# Patient Record
Sex: FEMALE | Race: WHITE | Employment: FULL TIME | URBAN - METROPOLITAN AREA
[De-identification: names, ages, dates, MRNs, and addresses within clinical notes are randomized per-mention and may not be internally consistent; named-entity substitution may affect disease eponyms.]

---

## 2017-01-06 ENCOUNTER — TELEPHONE (OUTPATIENT)
Dept: PULMONOLOGY | Age: 52
End: 2017-01-06

## 2017-01-06 NOTE — TELEPHONE ENCOUNTER
PT [id number]([phone]). PT HAVING CONGESTION, CHEST COLD & COUGH. SHE HAS TRIED OTC BUT NOTHING HAS HELPED. THINKS SHE MAY NEED AN ANTIBIOTIC. PLEASE CALL.

## 2017-01-10 ENCOUNTER — TELEPHONE (OUTPATIENT)
Dept: PULMONOLOGY | Age: 52
End: 2017-01-10

## 2017-01-10 RX ORDER — AMOXICILLIN AND CLAVULANATE POTASSIUM 875; 125 MG/1; MG/1
1 TABLET, FILM COATED ORAL 2 TIMES DAILY
Qty: 20 TAB | Refills: 0 | Status: SHIPPED | OUTPATIENT
Start: 2017-01-10 | End: 2017-01-20

## 2017-01-10 NOTE — TELEPHONE ENCOUNTER
The pt. states that her clear sputum of last week has turned yellow -green since the weekend. Chest congestion persists. She has dark tan nasal drainage with tiny spots of blood since last night. No elevated temp. .    Dr. Yon Robertson orders: Augmentin  The pt. Is informed of same.

## 2017-01-19 ENCOUNTER — HOSPITAL ENCOUNTER (OUTPATIENT)
Dept: LAB | Age: 52
Discharge: HOME | End: 2017-01-19

## 2017-01-19 DIAGNOSIS — J47.1 BRONCHIECTASIS WITH ACUTE EXACERBATION (HCC): ICD-10-CM

## 2017-01-19 PROCEDURE — 99001 SPECIMEN HANDLING PT-LAB: CPT | Performed by: INTERNAL MEDICINE

## 2017-02-20 ENCOUNTER — OFFICE VISIT (OUTPATIENT)
Dept: PULMONOLOGY | Age: 52
End: 2017-02-20

## 2017-02-20 VITALS
SYSTOLIC BLOOD PRESSURE: 90 MMHG | WEIGHT: 128 LBS | BODY MASS INDEX: 20.09 KG/M2 | DIASTOLIC BLOOD PRESSURE: 58 MMHG | HEIGHT: 67 IN | RESPIRATION RATE: 18 BRPM | OXYGEN SATURATION: 98 % | TEMPERATURE: 98.4 F | HEART RATE: 73 BPM

## 2017-02-20 DIAGNOSIS — J45.40 ALLERGIC ASTHMA, MODERATE PERSISTENT, UNCOMPLICATED: Primary | ICD-10-CM

## 2017-02-20 DIAGNOSIS — J47.9 BRONCHIECTASIS WITHOUT COMPLICATION (HCC): ICD-10-CM

## 2017-02-20 RX ORDER — FLUTICASONE FUROATE AND VILANTEROL 100; 25 UG/1; UG/1
1 POWDER RESPIRATORY (INHALATION) DAILY
Qty: 1 INHALER | Refills: 3 | Status: SHIPPED | OUTPATIENT
Start: 2017-02-20 | End: 2017-07-19 | Stop reason: SDUPTHER

## 2017-02-20 NOTE — PROGRESS NOTES
[facility]  Pulmonary, Critical Care, and Sleep Medicine      Pulmonary Office visit. Name: Katya Amor     : 1965     Date: 2017        Subjective:     Patient is a 45 y.o. female initially referred for Hemoptysis . 17   She feels much better with significant improvement in cough, wheezing with Breo ellipta. No further hemoptysis  Denies any new interval symptoms. Completed testing ordered and wishing to discuss results. HPI:  Patient reports being in good health and about 2 months back had an episode of spitting up blood X4 in an overnight period prompting visit to MD. She was prescribed antibiotics and improved. Further investigations with CXR and CT scan  Obtained with abnormalities on CT- Pulmonary consulted. She had since not had any further hemoptysis until 3 days back when she coughed up blood tinged mucus. She reports having a need to cough and clear her throat -usually in am. Denies any chronic cough. Denies any wheezing. Never diagnosed with asthma. She has seasonal increased nasal congestion, drainage  And postnasal drip- takes OTC Zrytec. Denies any fever, chills, weight loss. C/o chest pain- after eating certain foods. She has never smoked. No work in any industrial setting. She grew up in [address] [place] [address] and moved to [place] as an adult. No travel to [place] [address].  She has a dog at home. No h/o childhood respiratory illness- whooping cough. No known TB exposure. Allergies   Allergen Reactions    Percocet [Oxycodone-Acetaminophen] Nausea Only     Current Outpatient Prescriptions   Medication Sig Dispense Refill    fluticasone-vilanterol (BREO ELLIPTA) 100-25 mcg/dose inhaler Take 1 Puff by inhalation daily. 1 Inhaler 3    MULTIVITAMIN PO Take  by mouth.  predniSONE (DELTASONE) 5 mg tablet Take  by mouth. Tapering dose      cholecalciferol, vitamin d3, (VITAMIN D) 1,000 unit tablet Take  by mouth daily.        Review of Systems:  HEENT: No epistaxis, no nasal drainage, no difficulty in swallowing, no redness in eyes  Respiratory: as above  Cardiovascular: no chest pain, no palpitations, no chronic leg edema, no syncope  Gastrointestinal: no abd pain, no vomiting, no diarrhea, no bleeding symptoms  Genitourinary: No urinary symptoms or hematuria  Integument/breast: No ulcers or rashes  Musculoskeletal:Neg  Neurological: No focal weakness, no seizures, no headaches  Behvioral/Psych: No anxiety, no depression  Constitutional: No fever, no chills, no weight loss, no night sweats     Objective:     Visit Vitals    BP 90/58 (BP 1 Location: Left arm, BP Patient Position: Sitting)    Pulse 73    Temp 98.4 °F (36.9 °C)    Resp 18    Ht 5' 7\" (1.702 m)    Wt 58.1 kg (128 lb)    LMP 10/20/2011    SpO2 98%    BMI 20.05 kg/m2        Physical Exam:   General: comfortable, no acute distress  HEENT: pupils reactive, sclera anicteric, EOM intact  Neck: No adenopathy or thyroid swelling, no lymphadenopathy or JVD, supple  CVS: S1S2 no murmurs  RS: Mod AE bilaterally , Bilateral ronchi with expiratory wheezing,, no tactile fremitus or egophony, no accessory muscle use  Abd: soft, non tender, no hepatosplenomegaly  Neuro: non focal, awake, alert  Extrm: no leg edema, clubbing or cyanosis  Skin: no rash    Data review:    IgE- WNL  Allergy profile-    D. pteronyssinus <0.10  Class 0 kU/L Final     D. farinae Mite <0.10  Class 0 kU/L Final     Cat Hair/Dander <0.10  Class 0 kU/L Final     Dog Hair/Dander <0.10  Class 0 kU/L Final     B[place] Grass 4.18 (A) Class IV kU/L Final     Chap[place] grass 4.19 (A) Class IV kU/L Final     Umair Grass 2.79 (A) Class III kU/L Final     Bahia Grass 6.48 (A) Class IV kU/L Final     [id number] COCKROACH, AMERICAN <0.10  Class 0 kU/L Final     Penicillium notatum <0.10  Class 0 kU/L Final     Cladosporium herbarum <0.10  Class 0 kU/L Final     Aspergillus fumigatus <0.10  Class 0 kU/L Final     Mucor racemosus <0.10  Class 0 kU/L Final     Alternaria tenuis <0.10  Class 0 kU/L Final     Stemphylium botryosum <0.10  Class 0 kU/L Final     [id number] COMMON SILVER BIRCH 0.37 (A) Class I kU/L Final     [place] 1.67 (A) Class III kU/L Final     American White Elm 0.30 (A) Class 0/I kU/L Final     Maple/Box Elder 0.38 (A) Class I kU/L Final     White Hickory 0.70 (A) Class II kU/L Final     [id number] MAPLE LEAF [place] 0.20 (A) Class 0/I kU/L Final     White [place] <0.10  Class 0 kU/L Final     Sweet Gum 0.25 (A) Class 0/I kU/L Final     [place] 0.23 (A) Class 0/I kU/L Final     Ragweed, Short/Common 0.34 (A) Class I kU/L Final     Mugwort 0.19 (A) Class 0/I kU/L Final     Plantain, English 0.23 (A) Class 0/I kU/L Final     Pigweed, Rough 0.26 (A) Class 0/I kU/L Final     Sheep Sorrel (Dock) 0.31 (A) Class 0/I kU/L Final     Nettle 0.14 (A) Class 0/I kU/L Final       Narrative      Imaging:  I have personally reviewed the patients radiographs and have reviewed the reports and images:  CT scan of chest ( [facility]) reviewed images- biapical pleural thickening, apical parenchymal scarring and RML bronchiectatic changes with scattered infiltrates, RUL bronchiectatic changes     IMPRESSION:   · Bronchiectasis- predominant location in RML with some RUL distribution and biapical pleuroparenchymal infiltrates/fibrosis suggests a sequelae of remote inflammatory/granulomatous process. Currently exacerbated symptoms- bronchitis and hemoptysis with some scattered areas of infiltrates ? MAC. ? If she has sinopulmonary syndrome, less likely granulomatous vasculitis  · Hemoptysis secondary to above  · Reactive airways disease- allergic vs secondary to above.  Spirometry with partially reversible obstruction - FEV1 and FEF 25-75  · Allergic rhinitis and allergic asthma with positive allergy tests for grasses/trees,ragweed  · GERD- c/o \" gastritis and chest pain after eating spicy foods\"      RECOMMENDATIONS:     · Continue Breo-ellipta , prn albuterol  · Discussed strict allergen avoidance- grasses, trees. · Encouraged use of mask if outdoors in spring/fall  · Strict GERD precautions and add PPI  · Discussed at length diagnosis and treatment plan- written instructions given. · Will consider allergen therapy if unable to control with current  interventions  · Discussed allergy testing results and borderline low IgG result  · Will follow up in 3 months to monitor progress       Health maintenance screens deferred to Primary care provider.      Patel Richardson MD

## 2017-02-20 NOTE — COMMUNICATION BODY
[facility]  Pulmonary, Critical Care, and Sleep Medicine      Pulmonary Office visit. Name: Sandra Whaley     : 1965     Date: 2017        Subjective:     Patient is a 45 y.o. female initially referred for Hemoptysis . 17   She feels much better with significant improvement in cough, wheezing with Breo ellipta. No further hemoptysis  Denies any new interval symptoms. Completed testing ordered and wishing to discuss results. HPI:  Patient reports being in good health and about 2 months back had an episode of spitting up blood X4 in an overnight period prompting visit to MD. She was prescribed antibiotics and improved. Further investigations with CXR and CT scan  Obtained with abnormalities on CT- Pulmonary consulted. She had since not had any further hemoptysis until 3 days back when she coughed up blood tinged mucus. She reports having a need to cough and clear her throat -usually in am. Denies any chronic cough. Denies any wheezing. Never diagnosed with asthma. She has seasonal increased nasal congestion, drainage  And postnasal drip- takes OTC Zrytec. Denies any fever, chills, weight loss. C/o chest pain- after eating certain foods. She has never smoked. No work in any industrial setting. She grew up in [address] [place] [address] and moved to [place] as an adult. No travel to [place] [address].  She has a dog at home. No h/o childhood respiratory illness- whooping cough. No known TB exposure. Allergies   Allergen Reactions    Percocet [Oxycodone-Acetaminophen] Nausea Only     Current Outpatient Prescriptions   Medication Sig Dispense Refill    fluticasone-vilanterol (BREO ELLIPTA) 100-25 mcg/dose inhaler Take 1 Puff by inhalation daily. 1 Inhaler 3    MULTIVITAMIN PO Take  by mouth.  predniSONE (DELTASONE) 5 mg tablet Take  by mouth. Tapering dose      cholecalciferol, vitamin d3, (VITAMIN D) 1,000 unit tablet Take  by mouth daily.        Review of Systems:  HEENT: No epistaxis, no nasal drainage, no difficulty in swallowing, no redness in eyes  Respiratory: as above  Cardiovascular: no chest pain, no palpitations, no chronic leg edema, no syncope  Gastrointestinal: no abd pain, no vomiting, no diarrhea, no bleeding symptoms  Genitourinary: No urinary symptoms or hematuria  Integument/breast: No ulcers or rashes  Musculoskeletal:Neg  Neurological: No focal weakness, no seizures, no headaches  Behvioral/Psych: No anxiety, no depression  Constitutional: No fever, no chills, no weight loss, no night sweats     Objective:     Visit Vitals    BP 90/58 (BP 1 Location: Left arm, BP Patient Position: Sitting)    Pulse 73    Temp 98.4 °F (36.9 °C)    Resp 18    Ht 5' 7\" (1.702 m)    Wt 58.1 kg (128 lb)    LMP 10/20/2011    SpO2 98%    BMI 20.05 kg/m2        Physical Exam:   General: comfortable, no acute distress  HEENT: pupils reactive, sclera anicteric, EOM intact  Neck: No adenopathy or thyroid swelling, no lymphadenopathy or JVD, supple  CVS: S1S2 no murmurs  RS: Mod AE bilaterally , Bilateral ronchi with expiratory wheezing,, no tactile fremitus or egophony, no accessory muscle use  Abd: soft, non tender, no hepatosplenomegaly  Neuro: non focal, awake, alert  Extrm: no leg edema, clubbing or cyanosis  Skin: no rash    Data review:    IgE- WNL  Allergy profile-    D. pteronyssinus <0.10  Class 0 kU/L Final     D. farinae Mite <0.10  Class 0 kU/L Final     Cat Hair/Dander <0.10  Class 0 kU/L Final     Dog Hair/Dander <0.10  Class 0 kU/L Final     Berm[place] Grass 4.18 (A) Class IV kU/L Final     Chapin grass 4.19 (A) Class IV kU/L Final     Umair Grass 2.79 (A) Class III kU/L Final     Bahia Grass 6.48 (A) Class IV kU/L Final     [id number] COCKROACH, AMERICAN <0.10  Class 0 kU/L Final     Penicillium notatum <0.10  Class 0 kU/L Final     Cladosporium herbarum <0.10  Class 0 kU/L Final     Aspergillus fumigatus <0.10  Class 0 kU/L Final     Mucor racemosus <0.10  Class 0 kU/L Final     Alternaria tenuis <0.10  Class 0 kU/L Final     Stemphylium botryosum <0.10  Class 0 kU/L Final     [id number] COMMON SILVER BIRCH 0.37 (A) Class I kU/L Final     [place] 1.67 (A) Class III kU/L Final     American White Elm 0.30 (A) Class 0/I kU/L Final     Maple/Box Elder 0.38 (A) Class I kU/L Final     White Hickory 0.70 (A) Class II kU/L Final     [id number]-LOO MAPLE LEAF [place] 0.20 (A) Class 0/I kU/L Final     White [place] <0.10  Class 0 kU/L Final     Sweet Gum 0.25 (A) Class 0/I kU/L Final     Mountain [place] 0.23 (A) Class 0/I kU/L Final     Ragweed, Short/Common 0.34 (A) Class I kU/L Final     Mugwort 0.19 (A) Class 0/I kU/L Final     Plantain, English 0.23 (A) Class 0/I kU/L Final     Pigweed, Rough 0.26 (A) Class 0/I kU/L Final     Sheep Sorrel (Dock) 0.31 (A) Class 0/I kU/L Final     Nettle 0.14 (A) Class 0/I kU/L Final       Narrative      Imaging:  I have personally reviewed the patients radiographs and have reviewed the reports and images:  CT scan of chest ( [facility]) reviewed images- biapical pleural thickening, apical parenchymal scarring and RML bronchiectatic changes with scattered infiltrates, RUL bronchiectatic changes     IMPRESSION:   · Bronchiectasis- predominant location in RML with some RUL distribution and biapical pleuroparenchymal infiltrates/fibrosis suggests a sequelae of remote inflammatory/granulomatous process. Currently exacerbated symptoms- bronchitis and hemoptysis with some scattered areas of infiltrates ? MAC. ? If she has sinopulmonary syndrome, less likely granulomatous vasculitis  · Hemoptysis secondary to above  · Reactive airways disease- allergic vs secondary to above.  Spirometry with partially reversible obstruction - FEV1 and FEF 25-75  · Allergic rhinitis and allergic asthma with positive allergy tests for grasses/trees,ragweed  · GERD- c/o \" gastritis and chest pain after eating spicy foods\"      RECOMMENDATIONS:     · Continue Breo-ellipta , prn albuterol  · Discussed strict allergen avoidance- grasses, trees. · Encouraged use of mask if outdoors in spring/fall  · Strict GERD precautions and add PPI  · Discussed at length diagnosis and treatment plan- written instructions given. · Will consider allergen therapy if unable to control with current  interventions  · Discussed allergy testing results and borderline low IgG result  · Will follow up in 3 months to monitor progress       Health maintenance screens deferred to Primary care provider.      Lavon Mills MD

## 2017-02-20 NOTE — MR AVS SNAPSHOT
Visit Information Date & Time Provider Department Dept. Phone Encounter #  
 2/20/2017  2:45 PM Fredy Sloan MD [facility] [id number] Follow-up Instructions Return in about 3 months (around 5/20/2017). Upcoming Health Maintenance Date Due Hepatitis C Screening 1965 Pneumococcal 19-64 Medium Risk (1 of 1 - PPSV23) 8/26/1983 DTaP/Tdap/Td series (1 - Tdap) 8/26/1985 PAP AKA CERVICAL CYTOLOGY 7/19/2014 FOBT Q 1 YEAR AGE 50-75 8/26/2014 INFLUENZA AGE 9 TO ADULT 8/1/2016 BREAST CANCER SCRN MAMMOGRAM 11/14/2017 Allergies as of 2/20/2017  Review Complete On: 2/20/2017 By: Fredy Sloan MD  
  
 Severity Noted Reaction Type Reactions Percocet [Oxycodone-acetaminophen] Low 02/20/2017   Systemic Nausea Only Current Immunizations  Never Reviewed No immunizations on file. Not reviewed this visit Vitals BP Pulse Temp Resp Height(growth percentile) Weight(growth percentile) 90/58 (BP 1 Location: Left arm, BP Patient Position: Sitting) 73 98.4 °F (36.9 °C) 18 5' 7\" (1.702 m) 128 lb (58.1 kg) LMP SpO2 BMI OB Status 10/20/2011 98% 20.05 kg/m2 Hysterectomy BMI and BSA Data Body Mass Index Body Surface Area 20.05 kg/m 2 1.66 m 2 Preferred Pharmacy Pharmacy Name Phone [facility] [address] [facility] Your Updated Medication List  
  
   
This list is accurate as of: 2/20/17  3:08 PM.  Always use your most recent med list.  
  
  
  
  
 fluticasone-vilanterol 100-25 mcg/dose inhaler Commonly known as:  BREO ELLIPTA Take 1 Puff by inhalation daily. MULTIVITAMIN PO Take  by mouth.  
  
 predniSONE 5 mg tablet Commonly known as:  [employer] Take  by mouth. Tapering dose VITAMIN D3 1,000 unit tablet Generic drug:  cholecalciferol Take  by mouth daily. Prescriptions Sent to Pharmacy Refills  
 fluticasone-vilanterol (BREO ELLIPTA) 100-25 mcg/dose inhaler 3 Sig: Take 1 Puff by inhalation daily. Class: Normal  
 Pharmacy: [address] [facility] #: [phone] Route: Inhalation Follow-up Instructions Return in about 3 months (around 5/20/2017). Introducing [facility]! Dear Kev Whipple: Thank you for requesting a [employer] account. Our records indicate that you already have an active [employer] account. You can access your account anytime at [email or web address] Did you know that you can access your hospital and ER discharge instructions at any time in [employer]? You can also review all of your test results from your hospital stay or ER visit. Additional Information If you have questions, please visit the Frequently Asked Questions section of the [employer] website at [email or web address] Remember, [employer] is NOT to be used for urgent needs. For medical emergencies, dial 911. Now available from your iPhone and Android! Please provide this summary of care documentation to your next provider. Your primary care clinician is listed as Janice Yousif. If you have any questions after today's visit, please call [phone].

## 2017-02-20 NOTE — LETTER
2017 4:00 PM 
 
Patient:  Guido Ji YOB: 1965 Date of Visit: 2017 Dear Mera Lujan MD 
[address] [place] [address] VIA Facsimile: [phone] 
 : Thank you for referring Ms. Benedict Alpers to me for evaluation/treatment. Below are the relevant portions of my assessment and plan of care. [facility] Pulmonary, Critical Care, and Sleep Medicine Pulmonary Office visit. Name: Guido Ji : 1965 Date: 2017 Subjective:  
 
Patient is a 45 y.o. female initially referred for Hemoptysis . 17 She feels much better with significant improvement in cough, wheezing with Breo ellipta. No further hemoptysis Denies any new interval symptoms. Completed testing ordered and wishing to discuss results. HPI: 
Patient reports being in good health and about 2 months back had an episode of spitting up blood X4 in an overnight period prompting visit to MD. She was prescribed antibiotics and improved. Further investigations with CXR and CT scan  Obtained with abnormalities on CT- Pulmonary consulted. She had since not had any further hemoptysis until 3 days back when she coughed up blood tinged mucus. She reports having a need to cough and clear her throat -usually in am. Denies any chronic cough. Denies any wheezing. Never diagnosed with asthma. She has seasonal increased nasal congestion, drainage  And postnasal drip- takes OTC Zrytec. Denies any fever, chills, weight loss. C/o chest pain- after eating certain foods. She has never smoked. No work in any industrial setting. She grew up in [address] [place] [address] and moved to [place] as an adult. No travel to [place] [address]. 
She has a dog at home. No h/o childhood respiratory illness- whooping cough. No known TB exposure. Allergies Allergen Reactions  Percocet [Oxycodone-Acetaminophen] Nausea Only Current Outpatient Prescriptions Medication Sig Dispense Refill  fluticasone-vilanterol (BREO ELLIPTA) 100-25 mcg/dose inhaler Take 1 Puff by inhalation daily. 1 Inhaler 3  
 MULTIVITAMIN PO Take  by mouth.  predniSONE (DELTASONE) 5 mg tablet Take  by mouth. Tapering dose  cholecalciferol, vitamin d3, (VITAMIN D) 1,000 unit tablet Take  by mouth daily. Review of Systems: 
HEENT: No epistaxis, no nasal drainage, no difficulty in swallowing, no redness in eyes Respiratory: as above Cardiovascular: no chest pain, no palpitations, no chronic leg edema, no syncope Gastrointestinal: no abd pain, no vomiting, no diarrhea, no bleeding symptoms Genitourinary: No urinary symptoms or hematuria Integument/breast: No ulcers or rashes Musculoskeletal:Neg 
Neurological: No focal weakness, no seizures, no headaches Behvioral/Psych: No anxiety, no depression Constitutional: No fever, no chills, no weight loss, no night sweats Objective:  
 
Visit Vitals  BP 90/58 (BP 1 Location: Left arm, BP Patient Position: Sitting)  Pulse 73  Temp 98.4 °F (36.9 °C)  Resp 18  Ht 5' 7\" (1.702 m)  Wt 58.1 kg (128 lb)  LMP 10/20/2011  SpO2 98%  BMI 20.05 kg/m2 Physical Exam:  
General: comfortable, no acute distress HEENT: pupils reactive, sclera anicteric, EOM intact Neck: No adenopathy or thyroid swelling, no lymphadenopathy or JVD, supple CVS: S1S2 no murmurs RS: Mod AE bilaterally , Bilateral ronchi with expiratory wheezing,, no tactile fremitus or egophony, no accessory muscle use Abd: soft, non tender, no hepatosplenomegaly Neuro: non focal, awake, alert Extrm: no leg edema, clubbing or cyanosis Skin: no rash Data review: IgE- WNL Allergy profile- 
  D. pteronyssinus <0.10  Class 0 kU/L Final  
  D. farinae Mite <0.10  Class 0 kU/L Final  
  Cat Hair/Dander <0.10  Class 0 kU/L Final  
  Dog Hair/Dander <0.10  Class 0 kU/L Final  
   Bermuda Grass 4.18 (A) Class IV kU/L Final  
  Chapin grass 4.19 (A) Class IV kU/L Final  
  Umair Grass 2.79 (A) Class III kU/L Final  
  Bahia Grass 6.48 (A) Class IV kU/L Final  
  P045-DPS COCKROACH, AMERICAN <0.10  Class 0 kU/L Final  
  Penicillium notatum <0.10  Class 0 kU/L Final  
  Cladosporium herbarum <0.10  Class 0 kU/L Final  
  Aspergillus fumigatus <0.10  Class 0 kU/L Final  
  Mucor racemosus <0.10  Class 0 kU/L Final  
  Alternaria tenuis <0.10  Class 0 kU/L Final  
  Stemphylium botryosum <0.10  Class 0 kU/L Final  
  [id number] COMMON SILVER BIRCH 0.37 (A) Class I kU/L Final  
  [place] 1.67 (A) Class III kU/L Final  
  American White Elm 0.30 (A) Class 0/I kU/L Final  
  Maple/Box Elder 0.38 (A) Class I kU/L Final  
  White Hickory 0.70 (A) Class II kU/L Final  
  [id number] MAPLE LEAF [place] 0.20 (A) Class 0/I kU/L Final  
  White [place] <0.10  Class 0 kU/L Final  
  Sweet Gum 0.25 (A) Class 0/I kU/L Final  
  Mountain [place] 0.23 (A) Class 0/I kU/L Final  
  Ragweed, Short/Common 0.34 (A) Class I kU/L Final  
  Mugwort 0.19 (A) Class 0/I kU/L Final  
  Plantain, English 0.23 (A) Class 0/I kU/L Final  
  Pigweed, Rough 0.26 (A) Class 0/I kU/L Final  
  Sheep Sorrel (Dock) 0.31 (A) Class 0/I kU/L Final  
  Nettle 0.14 (A) Class 0/I kU/L Final  
   
Narrative Imaging: 
I have personally reviewed the patients radiographs and have reviewed the reports and images: CT scan of chest ( [facility]) reviewed images- biapical pleural thickening, apical parenchymal scarring and RML bronchiectatic changes with scattered infiltrates, RUL bronchiectatic changes IMPRESSION:  
· Bronchiectasis- predominant location in RML with some RUL distribution and biapical pleuroparenchymal infiltrates/fibrosis suggests a sequelae of remote inflammatory/granulomatous process. Currently exacerbated symptoms- bronchitis and hemoptysis with some scattered areas of infiltrates ?  MAC. ? If she has sinopulmonary syndrome, less likely granulomatous vasculitis · Hemoptysis secondary to above · Reactive airways disease- allergic vs secondary to above. Spirometry with partially reversible obstruction - FEV1 and FEF 25-75 · Allergic rhinitis and allergic asthma with positive allergy tests for grasses/trees,ragweed · GERD- c/o \" gastritis and chest pain after eating spicy foods\" RECOMMENDATIONS:  
 
· Continue  Breo-ellipta , prn albuterol · Discussed strict allergen avoidance- grasses, trees. · Encouraged use of mask if outdoors in spring/fall · Strict GERD precautions and add PPI · Discussed at length diagnosis and treatment plan- written instructions given. · Will consider allergen therapy if unable to control with current  interventions · Discussed allergy testing results and borderline low IgG result · Will follow up in 3 months to monitor progress Health maintenance screens deferred to Primary care provider. Andreas Grimes MD 
 
 
 
 
 
 
If you have questions, please do not hesitate to call me. I look forward to following Ms. Elliott along with you.  
 
 
 
Sincerely, 
 
 
Andreas Grimes MD

## 2017-07-19 ENCOUNTER — OFFICE VISIT (OUTPATIENT)
Dept: PULMONOLOGY | Age: 52
End: 2017-07-19

## 2017-07-19 VITALS
TEMPERATURE: 96.5 F | DIASTOLIC BLOOD PRESSURE: 50 MMHG | HEIGHT: 67 IN | SYSTOLIC BLOOD PRESSURE: 96 MMHG | WEIGHT: 126 LBS | RESPIRATION RATE: 12 BRPM | BODY MASS INDEX: 19.78 KG/M2 | OXYGEN SATURATION: 99 % | HEART RATE: 64 BPM

## 2017-07-19 DIAGNOSIS — J47.1 BRONCHIECTASIS WITH ACUTE EXACERBATION (HCC): ICD-10-CM

## 2017-07-19 DIAGNOSIS — J45.40 ALLERGIC ASTHMA, MODERATE PERSISTENT, UNCOMPLICATED: Primary | ICD-10-CM

## 2017-07-19 DIAGNOSIS — D80.1 HYPOGAMMAGLOBULINEMIA (HCC): ICD-10-CM

## 2017-07-19 RX ORDER — FLUTICASONE FUROATE AND VILANTEROL 100; 25 UG/1; UG/1
1 POWDER RESPIRATORY (INHALATION) DAILY
Qty: 1 INHALER | Refills: 3 | Status: SHIPPED | OUTPATIENT
Start: 2017-07-19 | End: 2017-11-02 | Stop reason: SDUPTHER

## 2017-07-19 RX ORDER — ALBUTEROL SULFATE 90 UG/1
2 AEROSOL, METERED RESPIRATORY (INHALATION)
COMMUNITY
Start: 2017-04-30 | End: 2017-11-02 | Stop reason: SDUPTHER

## 2017-07-19 RX ORDER — DOXYCYCLINE 100 MG/1
CAPSULE ORAL
COMMUNITY
Start: 2017-07-10 | End: 2017-11-02 | Stop reason: ALTCHOICE

## 2017-07-19 NOTE — COMMUNICATION BODY
[facility]  Pulmonary, Critical Care, and Sleep Medicine      Pulmonary Office visit. Name: Anusha Presley     : 1965     Date: 2017        Subjective:     Patient is a 45 y.o. female initially referred for Hemoptysis . Now followed for bronchiectasis. 17   Reports 2 episodes 2017 and  of fever, chills, throat discomfort and increased mucus- colored needing [facility] to [facility] and a course of antibiotics and Prednisone. Most recent treatment with Zithromax and now on doxycycline. She feels much better with significant improvement in cough, wheezing with Breo ellipta use and recent interventions  No further hemoptysis  Concerned about the 2 events and also coworkers being sick. Has been in same workspace for past 10 years but recent  Exacerbations . Denies any change in home environment. HPI:  Patient reports being in good health and about 2 months back had an episode of spitting up blood X4 in an overnight period prompting visit to MD. She was prescribed antibiotics and improved. Further investigations with CXR and CT scan  Obtained with abnormalities on CT- Pulmonary consulted. She had since not had any further hemoptysis until 3 days back when she coughed up blood tinged mucus. She reports having a need to cough and clear her throat -usually in am. Denies any chronic cough. Denies any wheezing. Never diagnosed with asthma. She has seasonal increased nasal congestion, drainage  And postnasal drip- takes OTC Zrytec. Denies any fever, chills, weight loss. C/o chest pain- after eating certain foods. She has never smoked. No work in any industrial setting. She grew up in [address] [place] [address] 1 and moved to [place] as an adult. No travel to [place] [address].  She has a dog at home. No h/o childhood respiratory illness- whooping cough. No known TB exposure.     Allergies   Allergen Reactions    Percocet [Oxycodone-Acetaminophen] Nausea Only Current Outpatient Prescriptions   Medication Sig Dispense Refill    doxycycline (MONODOX) 100 mg capsule       VENTOLIN HFA 90 mcg/actuation inhaler Take 2 Puffs by inhalation every four (4) hours as needed.  fluticasone-vilanterol (BREO ELLIPTA) 100-25 mcg/dose inhaler Take 1 Puff by inhalation daily. 1 Inhaler 3    predniSONE (DELTASONE) 5 mg tablet Take  by mouth. Tapering dose      MULTIVITAMIN PO Take  by mouth.  cholecalciferol, vitamin d3, (VITAMIN D) 1,000 unit tablet Take  by mouth daily. Review of Systems:  HEENT: No epistaxis, no nasal drainage, no difficulty in swallowing, no redness in eyes  Respiratory: as above  Cardiovascular: no chest pain, no palpitations, no chronic leg edema, no syncope  Gastrointestinal: no abd pain, no vomiting, no diarrhea, no bleeding symptoms  Genitourinary: No urinary symptoms or hematuria  Integument/breast: No ulcers or rashes  Musculoskeletal:Neg  Neurological: No focal weakness, no seizures, no headaches  Behvioral/Psych: No anxiety, no depression  Constitutional: No fever, no chills, no weight loss, no night sweats     Objective:     Visit Vitals    BP 96/50 (BP 1 Location: Left arm, BP Patient Position: Sitting)    Pulse 64    Temp 96.5 °F (35.8 °C) (Oral)    Resp 12    Ht 5' 7\" (1.702 m)    Wt 57.2 kg (126 lb)    LMP 10/20/2011    SpO2 99%    BMI 19.73 kg/m2        Physical Exam:   General: comfortable, no acute distress  HEENT: pupils reactive, sclera anicteric, EOM intact  Neck: No adenopathy or thyroid swelling, no lymphadenopathy or JVD, supple  CVS: S1S2 no murmurs  RS: Mod AE bilaterally , Bilateral ronchi with expiratory wheezing,, no tactile fremitus or egophony, no accessory muscle use  Abd: soft, non tender, no hepatosplenomegaly  Neuro: non focal, awake, alert  Extrm: no leg edema, clubbing or cyanosis  Skin: no rash    Data review:    IgE- WNL  Allergy profile-    D. pteronyssinus <0.10  Class 0 kU/L Final     D. farinae Mite <0.10  Class 0 kU/L Final     Cat Hair/Dander <0.10  Class 0 kU/L Final     Dog Hair/Dander <0.10  Class 0 kU/L Final     Bermuda Grass 4.18 (A) Class IV kU/L Final     Chapin grass 4.19 (A) Class IV kU/L Final     Umair Grass 2.79 (A) Class III kU/L Final     Bahia Grass 6.48 (A) Class IV kU/L Final     [id number] COCKROACH, AMERICAN <0.10  Class 0 kU/L Final     Penicillium notatum <0.10  Class 0 kU/L Final     Cladosporium herbarum <0.10  Class 0 kU/L Final     Aspergillus fumigatus <0.10  Class 0 kU/L Final     Mucor racemosus <0.10  Class 0 kU/L Final     Alternaria tenuis <0.10  Class 0 kU/L Final     Stemphylium botryosum <0.10  Class 0 kU/L Final     [id number] COMMON SILVER BIRCH 0.37 (A) Class I kU/L Final     [place]asper 1.67 (A) Class III kU/L Final     American White Elm 0.30 (A) Class 0/I kU/L Final     Maple/Box Elder 0.38 (A) Class I kU/L Final     White Hickory 0.70 (A) Class II kU/L Final     M470-[id number] MAPLE LEAF SYCAMORE 0.20 (A) Class 0/I kU/L Final     White [place] <0.10  Class 0 kU/L Final     Sweet Gum 0.25 (A) Class 0/I kU/L Final     Mountain [place] 0.23 (A) Class 0/I kU/L Final     Ragweed, Short/Common 0.34 (A) Class I kU/L Final     Mugwort 0.19 (A) Class 0/I kU/L Final     Plantain, English 0.23 (A) Class 0/I kU/L Final     Pigweed, Rough 0.26 (A) Class 0/I kU/L Final     Sheep Sorrel (Dock) 0.31 (A) Class 0/I kU/L Final     Nettle 0.14 (A) Class 0/I kU/L Final       Narrative      Imaging:  I have personally reviewed the patients radiographs and have reviewed the reports and images:  CT scan of chest ( [facility]) reviewed images- biapical pleural thickening, apical parenchymal scarring and RML bronchiectatic changes with scattered infiltrates, RUL bronchiectatic changes     IMPRESSION:   · Bronchiectasis- predominant location in RML with some RUL distribution and biapical pleuroparenchymal infiltrates/fibrosis suggests a sequelae of remote inflammatory/granulomatous process.  Currently exacerbated symptoms- bronchitis and hemoptysis with some scattered areas of infiltrates ? MAC. ? If she has sinopulmonary syndrome, less likely granulomatous vasculitis. ?? Role of low IgG subclass 3  · Hemoptysis secondary to above  · Reactive airways disease- allergic vs secondary to above. Spirometry with partially reversible obstruction - FEV1 and FEF 25-75  · Allergic rhinitis and allergic asthma with positive allergy tests for grasses/trees,ragweed  · GERD- c/o \" gastritis and chest pain after eating spicy foods\"      RECOMMENDATIONS:     · Continue  Breo-ellipta , prn albuterol  · Discussed strict allergen avoidance- grasses, trees. · Encouraged use of mask if outdoors in spring/fall  · Strict GERD precautions and add PPI  · Discussed at length diagnosis and treatment plan- written instructions given. · Will consider allergen therapy if unable to control with current  interventions  · Discussed allergy testing results and borderline low IgG result. Will repeat in view of frequent infections  · Discussed vigilance and checking out for infectious /allergic triggers in work space  · Will follow up in 3 months to monitor progress       Health maintenance screens deferred to Primary care provider.      Gamaliel Rothman MD

## 2017-07-19 NOTE — MR AVS SNAPSHOT
Visit Information Date & Time Provider Department Dept. Phone Encounter #  
 7/19/2017 11:30 AM Teddy Kearney MD [facility] [id number] Follow-up Instructions Return in about 3 months (around 10/19/2017). Upcoming Health Maintenance Date Due Hepatitis C Screening 1965 DTaP/Tdap/Td series (1 - Tdap) 8/26/1985 PAP AKA CERVICAL CYTOLOGY 7/19/2014 FOBT Q 1 YEAR AGE 50-75 8/26/2014 BREAST CANCER SCRN MAMMOGRAM 11/14/2017 Pneumococcal 19-64 Medium Risk (1 of 1 - PPSV23) 10/19/2017* INFLUENZA AGE 9 TO ADULT 8/1/2017 *Topic was postponed. The date shown is not the original due date. Allergies as of 7/19/2017  Review Complete On: 7/19/2017 By: Teddy Kearney MD  
  
 Severity Noted Reaction Type Reactions Percocet [Oxycodone-acetaminophen] Low 02/20/2017   Systemic Nausea Only Current Immunizations  Never Reviewed No immunizations on file. Not reviewed this visit You Were Diagnosed With   
  
 Codes Comments Hypogammaglobulinemia ([facility].)    -  Primary ICD-10-CM: D80.1 ICD-9-CM: 279.00 Vitals BP Pulse Temp Resp Height(growth percentile) Weight(growth percentile) 96/50 (BP 1 Location: Left arm, BP Patient Position: Sitting) 64 96.5 °F (35.8 °C) (Oral) 12 5' 7\" (1.702 m) 126 lb (57.2 kg) LMP SpO2 BMI OB Status 10/20/2011 99% 19.73 kg/m2 Hysterectomy Vitals History BMI and BSA Data Body Mass Index Body Surface Area  
 19.73 kg/m 2 1.64 m 2 Preferred Pharm[facility] [address] [facility] Your Updated Medication List  
  
   
This list is accurate as of: 7/19/17 12:04 PM.  Always use your most recent med list.  
  
  
  
  
 doxycycline 100 mg capsule Commonly known as:  MONODOX  
  
 fluticasone-vilanterol 100-25 mcg/dose inhaler Commonly known as:  BREO ELLIPTA Take 1 Puff by inhalation daily. MULTIVITAMIN PO Take  by mouth.  
  
 predniSONE 5 mg tablet Commonly known as:  Ancel Clock Take  by mouth. Tapering dose VENTOLIN HFA 90 mcg/actuation inhaler Generic drug:  albuterol Take 2 Puffs by inhalation every four (4) hours as needed. VITAMIN D3 1,000 unit tablet Generic drug:  cholecalciferol Take  by mouth daily. Prescriptions Sent to Pharmacy Refills  
 fluticasone-vilanterol (BREO ELLIPTA) 100-25 mcg/dose inhaler 3 Sig: Take 1 Puff by inhalation daily. Class: Normal  
 Pharmacy: [facility] [address] [place] [address] #: [phone] Route: Inhalation Follow-up Instructions Return in about 3 months (around 10/19/2017). To-Do List   
 09/19/2017 Lab:  IGG SUBCLASSES Introducing [facility]! Dear Imtiaz Cagle: Thank you for requesting a [employer] account. Our records indicate that you already have an active Thinkf[employer] account. You can access your account anytime at [email or web address] Did you know that you can access your hospital and ER discharge instructions at any time in Thinkfuse? You can also review all of your test results from your hospital stay or ER visit. Additional Information If you have questions, please visit the Frequently Asked Questions section of the Thinkfuse website at [email or web address] Remember, Thinkfuse is NOT to be used for urgent needs. For medical emergencies, dial 911. Now available from your iPhone and Android! Please provide this summary of care documentation to your next provider. Your primary care clinician is listed as Archer Sacks. If you have any questions after today's visit, please call [phone].

## 2017-07-19 NOTE — PROGRESS NOTES
[facility]  Pulmonary, Critical Care, and Sleep Medicine      Pulmonary Office visit. Name: Hi Haskins     : 1965     Date: 2017        Subjective:     Patient is a 45 y.o. female initially referred for Hemoptysis . Now followed for bronchiectasis. 17   Reports 2 episodes 2017 and  of fever, chills, throat discomfort and increased mucus- colored needing visit to [facility] and a course of antibiotics and Prednisone. Most recent treatment with Zithromax and now on doxycycline. She feels much better with significant improvement in cough, wheezing with Breo ellipta use and recent interventions  No further hemoptysis  Concerned about the 2 events and also coworkers being sick. Has been in same workspace for past 10 years but recent  Exacerbations . Denies any change in home environment. HPI:  Patient reports being in good health and about 2 months back had an episode of spitting up blood X4 in an overnight period prompting visit to MD. She was prescribed antibiotics and improved. Further investigations with CXR and CT scan  Obtained with abnormalities on CT- Pulmonary consulted. She had since not had any further hemoptysis until 3 days back when she coughed up blood tinged mucus. She reports having a need to cough and clear her throat -usually in am. Denies any chronic cough. Denies any wheezing. Never diagnosed with asthma. She has seasonal increased nasal congestion, drainage  And postnasal drip- takes OTC Zrytec. Denies any fever, chills, weight loss. C/o chest pain- after eating certain foods. She has never smoked. No work in any industrial setting. She grew up in [address] [place] [address] and moved to [place] as an adult. No travel to [place] [address].  She has a dog at home. No h/o childhood respiratory illness- whooping cough. No known TB exposure.     Allergies   Allergen Reactions    Percocet [Oxycodone-Acetaminophen] Nausea Only Current Outpatient Prescriptions   Medication Sig Dispense Refill    doxycycline (MONODOX) 100 mg capsule       VENTOLIN HFA 90 mcg/actuation inhaler Take 2 Puffs by inhalation every four (4) hours as needed.  fluticasone-vilanterol (BREO ELLIPTA) 100-25 mcg/dose inhaler Take 1 Puff by inhalation daily. 1 Inhaler 3    predniSONE (DELTASONE) 5 mg tablet Take  by mouth. Tapering dose      MULTIVITAMIN PO Take  by mouth.  cholecalciferol, vitamin d3, (VITAMIN D) 1,000 unit tablet Take  by mouth daily. Review of Systems:  HEENT: No epistaxis, no nasal drainage, no difficulty in swallowing, no redness in eyes  Respiratory: as above  Cardiovascular: no chest pain, no palpitations, no chronic leg edema, no syncope  Gastrointestinal: no abd pain, no vomiting, no diarrhea, no bleeding symptoms  Genitourinary: No urinary symptoms or hematuria  Integument/breast: No ulcers or rashes  Musculoskeletal:Neg  Neurological: No focal weakness, no seizures, no headaches  Behvioral/Psych: No anxiety, no depression  Constitutional: No fever, no chills, no weight loss, no night sweats     Objective:     Visit Vitals    BP 96/50 (BP 1 Location: Left arm, BP Patient Position: Sitting)    Pulse 64    Temp 96.5 °F (35.8 °C) (Oral)    Resp 12    Ht 5' 7\" (1.702 m)    Wt 57.2 kg (126 lb)    LMP 10/20/2011    SpO2 99%    BMI 19.73 kg/m2        Physical Exam:   General: comfortable, no acute distress  HEENT: pupils reactive, sclera anicteric, EOM intact  Neck: No adenopathy or thyroid swelling, no lymphadenopathy or JVD, supple  CVS: S1S2 no murmurs  RS: Mod AE bilaterally , Bilateral ronchi with expiratory wheezing,, no tactile fremitus or egophony, no accessory muscle use  Abd: soft, non tender, no hepatosplenomegaly  Neuro: non focal, awake, alert  Extrm: no leg edema, clubbing or cyanosis  Skin: no rash    Data review:    IgE- WNL  Allergy profile-    D. pteronyssinus <0.10  Class 0 kU/L Final     D. farinae Mite <0.10  Class 0 kU/L Final     Cat Hair/Dander <0.10  Class 0 kU/L Final     Dog Hair/Dander <0.10  Class 0 kU/L Final     Bermuda Grass 4.18 (A) Class IV kU/L Final     Chapin grass 4.19 (A) Class IV kU/L Final     Umair Grass 2.79 (A) Class III kU/L Final     Bahia Grass 6.48 (A) Class IV kU/L Final     [id number] COCKROACH, AMERICAN <0.10  Class 0 kU/L Final     Penicillium notatum <0.10  Class 0 kU/L Final     Cladosporium herbarum <0.10  Class 0 kU/L Final     Aspergillus fumigatus <0.10  Class 0 kU/L Final     Mucor racemosus <0.10  Class 0 kU/L Final     Alternaria tenuis <0.10  Class 0 kU/L Final     Stemphylium botryosum <0.10  Class 0 kU/L Final     [id number] COMMON SILVER BIRCH 0.37 (A) Class I kU/L Final     Hope 1.67 (A) Class III kU/L Final     American White Elm 0.30 (A) Class 0/I kU/L Final     Maple/Box Elder 0.38 (A) Class I kU/L Final     White Hickory 0.70 (A) Class II kU/L Final     [id number] MAPLE LEAF SYCAMORE 0.20 (A) Class 0/I kU/L Final     White [place] <0.10  Class 0 kU/L Final     Sweet Gum 0.25 (A) Class 0/I kU/L Final     Mountain [place] 0.23 (A) Class 0/I kU/L Final     Ragweed, Short/Common 0.34 (A) Class I kU/L Final     Mugwort 0.19 (A) Class 0/I kU/L Final     Plantain, English 0.23 (A) Class 0/I kU/L Final     Pigweed, Rough 0.26 (A) Class 0/I kU/L Final     Sheep Sorrel (Dock) 0.31 (A) Class 0/I kU/L Final     Nettle 0.14 (A) Class 0/I kU/L Final       Narrative      Imaging:  I have personally reviewed the patients radiographs and have reviewed the reports and images:  CT scan of chest ( [facility]) reviewed images- biapical pleural thickening, apical parenchymal scarring and RML bronchiectatic changes with scattered infiltrates, RUL bronchiectatic changes     IMPRESSION:   · Bronchiectasis- predominant location in RML with some RUL distribution and biapical pleuroparenchymal infiltrates/fibrosis suggests a sequelae of remote inflammatory/granulomatous process.  Currently exacerbated symptoms- bronchitis and hemoptysis with some scattered areas of infiltrates ? MAC. ? If she has sinopulmonary syndrome, less likely granulomatous vasculitis. ?? Role of low IgG subclass 3  · Hemoptysis secondary to above  · Reactive airways disease- allergic vs secondary to above. Spirometry with partially reversible obstruction - FEV1 and FEF 25-75  · Allergic rhinitis and allergic asthma with positive allergy tests for grasses/trees,ragweed  · GERD- c/o \" gastritis and chest pain after eating spicy foods\"      RECOMMENDATIONS:     · Continue  Breo-ellipta , prn albuterol  · Discussed strict allergen avoidance- grasses, trees. · Encouraged use of mask if outdoors in spring/fall  · Strict GERD precautions and add PPI  · Discussed at length diagnosis and treatment plan- written instructions given. · Will consider allergen therapy if unable to control with current  interventions  · Discussed allergy testing results and borderline low IgG result. Will repeat in view of frequent infections  · Discussed vigilance and checking out for infectious /allergic triggers in work space  · Will follow up in 3 months to monitor progress       Health maintenance screens deferred to Primary care provider.      Caroline Stewart MD

## 2017-07-19 NOTE — LETTER
2017 3:04 PM 
 
Patient:  Preeti Madden YOB: 1965 Date of Visit: 2017 Dear Tod Fishman MD 
[place] [address] [address] [place] [address] VIA Facsimile: [phone] 
 : Thank you for referring Ms. Justen Harvey to me for evaluation/treatment. Below are the relevant portions of my assessment and plan of care. [facility] Pulmonary, Critical Care, and Sleep Medicine Pulmonary Office visit. Name: Preeti Madden : 1965 Date: 2017 Subjective:  
 
Patient is a 45 y.o. female initially referred for Hemoptysis . Now followed for bronchiectasis. 17 Reports 2 episodes 2017 and  of fever, chills, throat discomfort and increased mucus- colored needing visit to Patient [facility] and a course of antibiotics and Prednisone. Most recent treatment with Zithromax and now on doxycycline. She feels much better with significant improvement in cough, wheezing with Breo ellipta use and recent interventions No further hemoptysis Concerned about the 2 events and also coworkers being sick. Has been in same workspace for past 10 years but recent  Exacerbations . Denies any change in home environment. HPI: 
Patient reports being in good health and about 2 months back had an episode of spitting up blood X4 in an overnight period prompting visit to MD. She was prescribed antibiotics and improved. Further investigations with CXR and CT scan  Obtained with abnormalities on CT- Pulmonary consulted. She had since not had any further hemoptysis until 3 days back when she coughed up blood tinged mucus. She reports having a need to cough and clear her throat -usually in am. Denies any chronic cough. Denies any wheezing. Never diagnosed with asthma. She has seasonal increased nasal congestion, drainage  And postnasal drip- takes OTC Zrytec. Denies any fever, chills, weight loss. C/o chest pain- after eating certain foods. She has never smoked. No work in any industrial setting. She grew up in [address] [place] [address] and moved to [place] as an adult. No travel to [place] [address]. 
She has a dog at home. No h/o childhood respiratory illness- whooping cough. No known TB exposure. Allergies Allergen Reactions  Percocet [Oxycodone-Acetaminophen] Nausea Only Current Outpatient Prescriptions Medication Sig Dispense Refill  doxycycline (MONODOX) 100 mg capsule  VENTOLIN HFA 90 mcg/actuation inhaler Take 2 Puffs by inhalation every four (4) hours as needed.  fluticasone-vilanterol (BREO ELLIPTA) 100-25 mcg/dose inhaler Take 1 Puff by inhalation daily. 1 Inhaler 3  predniSONE (DELTASONE) 5 mg tablet Take  by mouth. Tapering dose  MULTIVITAMIN PO Take  by mouth.  cholecalciferol, vitamin d3, (VITAMIN D) 1,000 unit tablet Take  by mouth daily. Review of Systems: 
HEENT: No epistaxis, no nasal drainage, no difficulty in swallowing, no redness in eyes Respiratory: as above Cardiovascular: no chest pain, no palpitations, no chronic leg edema, no syncope Gastrointestinal: no abd pain, no vomiting, no diarrhea, no bleeding symptoms Genitourinary: No urinary symptoms or hematuria Integument/breast: No ulcers or rashes Musculoskeletal:Neg 
Neurological: No focal weakness, no seizures, no headaches Behvioral/Psych: No anxiety, no depression Constitutional: No fever, no chills, no weight loss, no night sweats Objective:  
 
Visit Vitals  BP 96/50 (BP 1 Location: Left arm, BP Patient Position: Sitting)  Pulse 64  Temp 96.5 °F (35.8 °C) (Oral)  Resp 12  Ht 5' 7\" (1.702 m)  Wt 57.2 kg (126 lb)  LMP 10/20/2011  SpO2 99%  BMI 19.73 kg/m2 Physical Exam:  
General: comfortable, no acute distress HEENT: pupils reactive, sclera anicteric, EOM intact Neck: No adenopathy or thyroid swelling, no lymphadenopathy or JVD, supple CVS: S1S2 no murmurs RS: Mod AE bilaterally , Bilateral ronchi with expiratory wheezing,, no tactile fremitus or egophony, no accessory muscle use Abd: soft, non tender, no hepatosplenomegaly Neuro: non focal, awake, alert Extrm: no leg edema, clubbing or cyanosis Skin: no rash Data review: IgE- WNL Allergy profile- 
  D. pteronyssinus <0.10  Class 0 kU/L Final  
  D. farinae Mite <0.10  Class 0 kU/L Final  
  Cat Hair/Dander <0.10  Class 0 kU/L Final  
  Dog Hair/Dander <0.10  Class 0 kU/L Final  
  Bermuda Grass 4.18 (A) Class IV kU/L Final  
  Chapin grass 4.19 (A) Class IV kU/L Final  
  Umair Grass 2.79 (A) Class III kU/L Final  
  Bahia Grass 6.48 (A) Class IV kU/L Final  
  I170-JPI COCKROACH, AMERICAN <0.10  Class 0 kU/L Final  
  Penicillium notatum <0.10  Class 0 kU/L Final  
  Cladosporium herbarum <0.10  Class 0 kU/L Final  
  Aspergillus fumigatus <0.10  Class 0 kU/L Final  
  Mucor racemosus <0.10  Class 0 kU/L Final  
  Alternaria tenuis <0.10  Class 0 kU/L Final  
  Stemphylium botryosum <0.10  Class 0 kU/L Final  
  Y213-IEN COMMON SILVER BIRCH 0.37 (A) Class I kU/L Final  
  [place]art 1.67 (A) Class III kU/L Final  
  American White Elm 0.30 (A) Class 0/I kU/L Final  
  Maple/Box Elder 0.38 (A) Class I kU/L Final  
  White Hickory 0.70 (A) Class II kU/L Final  
  P387-YTE MAPLE LEAF SYCAMORE 0.20 (A) Class 0/I kU/L Final  
  White [place] <0.10  Class 0 kU/L Final  
  Sweet Gum 0.25 (A) Class 0/I kU/L Final  
  Mountain [place] 0.23 (A) Class 0/I kU/L Final  
  Ragweed, Short/Common 0.34 (A) Class I kU/L Final  
  Mugwort 0.19 (A) Class 0/I kU/L Final  
  Plantain, English 0.23 (A) Class 0/I kU/L Final  
  Pigweed, Rough 0.26 (A) Class 0/I kU/L Final  
  Sheep Sorrel (Dock) 0.31 (A) Class 0/I kU/L Final  
  Nettle 0.14 (A) Class 0/I kU/L Final  
   
Narrative Imaging: 
I have personally reviewed the patients radiographs and have reviewed the reports and images: CT scan of chest ( [facility]) reviewed images- biapical pleural thickening, apical parenchymal scarring and RML bronchiectatic changes with scattered infiltrates, RUL bronchiectatic changes IMPRESSION:  
· Bronchiectasis- predominant location in RML with some RUL distribution and biapical pleuroparenchymal infiltrates/fibrosis suggests a sequelae of remote inflammatory/granulomatous process. Currently exacerbated symptoms- bronchitis and hemoptysis with some scattered areas of infiltrates ? MAC. ? If she has sinopulmonary syndrome, less likely granulomatous vasculitis. ?? Role of low IgG subclass 3 
· Hemoptysis secondary to above · Reactive airways disease- allergic vs secondary to above. Spirometry with partially reversible obstruction - FEV1 and FEF 25-75 · Allergic rhinitis and allergic asthma with positive allergy tests for grasses/trees,ragweed · GERD- c/o \" gastritis and chest pain after eating spicy foods\" RECOMMENDATIONS:  
 
· Continue  Breo-ellipta , prn albuterol · Discussed strict allergen avoidance- grasses, trees. · Encouraged use of mask if outdoors in spring/fall · Strict GERD precautions and add PPI · Discussed at length diagnosis and treatment plan- written instructions given. · Will consider allergen therapy if unable to control with current  interventions · Discussed allergy testing results and borderline low IgG result. Will repeat in view of frequent infections · Discussed vigilance and checking out for infectious /allergic triggers in work space · Will follow up in 3 months to monitor progress Health maintenance screens deferred to Primary care provider. Ely Gomez MD 
 
 
 
 
 
 
If you have questions, please do not hesitate to call me. I look forward to following Ms. Elliott along with you.  
 
 
 
Sincerely, 
 
 
Ely Gomez MD

## 2017-08-03 ENCOUNTER — TELEPHONE (OUTPATIENT)
Dept: PULMONOLOGY | Age: 52
End: 2017-08-03

## 2017-08-03 RX ORDER — DOXYCYCLINE 100 MG/1
100 CAPSULE ORAL 2 TIMES DAILY
Qty: 20 CAP | Refills: 0 | Status: SHIPPED | OUTPATIENT
Start: 2017-08-03 | End: 2017-08-13

## 2017-08-03 NOTE — TELEPHONE ENCOUNTER
Pt states she saw Dr. Angie Espinoza 7/19/17 and at that time she was not having any episodes of hemoptosis. On Saturday she had a 15 min episode of about 4-5 x bright red blood size of dine-quarter. After that episode it stopped. Since she has had a very small tinge of dark blood past 2 days.

## 2017-10-27 ENCOUNTER — HOSPITAL ENCOUNTER (OUTPATIENT)
Dept: LAB | Age: 52
Discharge: HOME | End: 2017-10-27

## 2017-10-27 PROCEDURE — 99001 SPECIMEN HANDLING PT-LAB: CPT | Performed by: FAMILY MEDICINE

## 2017-11-02 ENCOUNTER — OFFICE VISIT (OUTPATIENT)
Dept: PULMONOLOGY | Age: 52
End: 2017-11-02

## 2017-11-02 VITALS
SYSTOLIC BLOOD PRESSURE: 96 MMHG | TEMPERATURE: 99.5 F | DIASTOLIC BLOOD PRESSURE: 60 MMHG | HEIGHT: 67 IN | WEIGHT: 128 LBS | RESPIRATION RATE: 16 BRPM | OXYGEN SATURATION: 97 % | HEART RATE: 74 BPM | BODY MASS INDEX: 20.09 KG/M2

## 2017-11-02 DIAGNOSIS — Z23 ENCOUNTER FOR IMMUNIZATION: ICD-10-CM

## 2017-11-02 DIAGNOSIS — J30.89 CHRONIC NONSEASONAL ALLERGIC RHINITIS DUE TO POLLEN: ICD-10-CM

## 2017-11-02 DIAGNOSIS — J47.9 BRONCHIECTASIS WITHOUT COMPLICATION (HCC): ICD-10-CM

## 2017-11-02 DIAGNOSIS — J45.20 MILD INTERMITTENT ASTHMA WITHOUT COMPLICATION: Primary | ICD-10-CM

## 2017-11-02 RX ORDER — FLUTICASONE FUROATE AND VILANTEROL 100; 25 UG/1; UG/1
1 POWDER RESPIRATORY (INHALATION) DAILY
Qty: 1 INHALER | Refills: 6 | Status: SHIPPED | OUTPATIENT
Start: 2017-11-02 | End: 2018-06-15 | Stop reason: SDUPTHER

## 2017-11-02 RX ORDER — ALBUTEROL SULFATE 90 UG/1
2 AEROSOL, METERED RESPIRATORY (INHALATION)
Qty: 1 INHALER | Refills: 6 | Status: SHIPPED | OUTPATIENT
Start: 2017-11-02 | End: 2019-03-03 | Stop reason: SDUPTHER

## 2017-11-02 NOTE — PROGRESS NOTES
[facility]  Pulmonary, Critical Care, and Sleep Medicine      Pulmonary Office visit. Name: Jorge Alberto Anglin     : 1965     Date: 2017        Subjective:     Patient is a 47 y.o. female initially referred for Hemoptysis . Now followed for bronchiectasis. 17    Has been feeling well since last visit. Denies any persistent cough, mucus, hemoptysis  Denies SOB or wheezing- uses rescue inhaler infrequently  Using Breo daily. No fever, chills. Has been in same workspace for past 10 years but recent  Exacerbations . Denies any change in home environment. HPI:  Patient reports being in good health and about 2 months back had an episode of spitting up blood X4 in an overnight period prompting visit to MD. She was prescribed antibiotics and improved. Further investigations with CXR and CT scan  Obtained with abnormalities on CT- Pulmonary consulted. She had since not had any further hemoptysis until 3 days back when she coughed up blood tinged mucus. She reports having a need to cough and clear her throat -usually in am. Denies any chronic cough. Denies any wheezing. Never diagnosed with asthma. She has seasonal increased nasal congestion, drainage  And postnasal drip- takes OTC Zrytec. Denies any fever, chills, weight loss. C/o chest pain- after eating certain foods. She has never smoked. No work in any industrial setting. She grew up in [address] [place] [address] and moved to [place] as an adult. No travel to [place] [address].  She has a dog at home. No h/o childhood respiratory illness- whooping cough. No known TB exposure. Allergies   Allergen Reactions    Percocet [Oxycodone-Acetaminophen] Nausea Only     Current Outpatient Prescriptions   Medication Sig Dispense Refill    fluticasone-vilanterol (BREO ELLIPTA) 100-25 mcg/dose inhaler Take 1 Puff by inhalation daily.  1 Inhaler 6    VENTOLIN HFA 90 mcg/actuation inhaler Take 2 Puffs by inhalation every four (4) hours as needed. 1 Inhaler 6    MULTIVITAMIN PO Take  by mouth.  cholecalciferol, vitamin d3, (VITAMIN D) 1,000 unit tablet Take  by mouth daily. Review of Systems:  HEENT: No epistaxis, no nasal drainage, no difficulty in swallowing, no redness in eyes  Respiratory: as above  Cardiovascular: no chest pain, no palpitations, no chronic leg edema, no syncope  Gastrointestinal: no abd pain, no vomiting, no diarrhea, no bleeding symptoms  Genitourinary: No urinary symptoms or hematuria  Integument/breast: No ulcers or rashes  Musculoskeletal:Neg  Neurological: No focal weakness, no seizures, no headaches  Behvioral/Psych: No anxiety, no depression  Constitutional: No fever, no chills, no weight loss, no night sweats     Objective:     Visit Vitals    BP 96/60 (BP 1 Location: Left arm, BP Patient Position: Sitting)    Pulse 74    Temp 99.5 °F (37.5 °C) (Oral)    Resp 16    Ht 5' 7\" (1.702 m)    Wt 58.1 kg (128 lb)    LMP 10/20/2011    SpO2 97%    BMI 20.05 kg/m2        Physical Exam:   General: comfortable, no acute distress  HEENT: pupils reactive, sclera anicteric, EOM intact  Neck: No adenopathy or thyroid swelling, no lymphadenopathy or JVD, supple  CVS: S1S2 no murmurs  RS: Mod AE bilaterally , Bilateral ronchi with expiratory wheezing,, no tactile fremitus or egophony, no accessory muscle use  Abd: soft, non tender, no hepatosplenomegaly  Neuro: non focal, awake, alert  Extrm: no leg edema, clubbing or cyanosis  Skin: no rash    Data review:    IgE- WNL  Allergy profile-    D. pteronyssinus <0.10  Class 0 kU/L Final     D. farinae Mite <0.10  Class 0 kU/L Final     Cat Hair/Dander <0.10  Class 0 kU/L Final     Dog Hair/Dander <0.10  Class 0 kU/L Final     Bermuda Grass 4.18 (A) Class IV kU/L Final     Chapin grass 4.19 (A) Class IV kU/L Final     Umair Grass 2.79 (A) Class III kU/L Final     Bahia Grass 6.48 (A) Class IV kU/L Final     H450-ZEW COCKROACH, AMERICAN <0.10  Class 0 kU/L Final     Penicillium notatum <0.10  Class 0 kU/L Final     Cladosporium herbarum <0.10  Class 0 kU/L Final     Aspergillus fumigatus <0.10  Class 0 kU/L Final     Mucor racemosus <0.10  Class 0 kU/L Final     Alternaria tenuis <0.10  Class 0 kU/L Final     Stemphylium botryosum <0.10  Class 0 kU/L Final     J360-XCA COMMON SILVER BIRCH 0.37 (A) Class I kU/L Final     [place] 1.67 (A) Class III kU/L Final     American White Elm 0.30 (A) Class 0/I kU/L Final     Maple/Box Elder 0.38 (A) Class I kU/L Final     White Hickory 0.70 (A) Class II kU/L Final     I688-[id number] MAPLE LEAF [place]CAMORE 0.20 (A) Class 0/I kU/L Final     White [place] <0.10  Class 0 kU/L Final     Sweet Gum 0.25 (A) Class 0/I kU/L Final     Mountain [place] 0.23 (A) Class 0/I kU/L Final     Ragweed, Short/Common 0.34 (A) Class I kU/L Final     Mugwort 0.19 (A) Class 0/I kU/L Final     Plantain, English 0.23 (A) Class 0/I kU/L Final     Pigweed, Rough 0.26 (A) Class 0/I kU/L Final     Sheep Sorrel (Dock) 0.31 (A) Class 0/I kU/L Final     Nettle 0.14 (A) Class 0/I kU/L Final       Narrative      Imaging:  I have personally reviewed the patients radiographs and have reviewed the reports and images:  CT scan of chest ( [facility]) reviewed images- biapical pleural thickening, apical parenchymal scarring and RML bronchiectatic changes with scattered infiltrates, RUL bronchiectatic changes     IMPRESSION:   · Bronchiectasis- predominant location in RML with some RUL distribution and biapical pleuroparenchymal infiltrates/fibrosis suggests a sequelae of remote inflammatory/granulomatous process. Currently compensated symptoms-no recent bronchitis or hemoptysis   · CT scan (2016) with some scattered areas of infiltrates ? Secondary to above  · Hemoptysis secondary to above  · Reactive airways disease- allergic vs secondary to above.  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 Repeat 10/2017- normal range. RECOMMENDATIONS:     · Continue  Breo-ellipta , prn albuterol  · Discussed strict allergen avoidance- grasses, trees. · Encouraged use of mask if outdoors in spring/fall  · Strict GERD precautions and add PPI  · Discussed at length diagnosis and treatment plan- written instructions given. · Will consider allergen therapy if unable to control with current  interventions  · Discussed new lab results- specifically IgG subclass ( now normal)  · Discussed vigilance and checking out for infectious /allergic triggers in work space  · Preventive vaccinations- Pneumovax PPSV23 today. · Will get Influenza vaccine at work- Nov 13  · Will follow up in 6 months to monitor progress       Health maintenance screens deferred to Primary care provider.      Josefa Stanton MD

## 2017-11-02 NOTE — PROGRESS NOTES
Kirstin Mendez is a 47 y.o. female who presents for routine immunization for pnuemonia. She denies any symptoms , reactions or allergies that would exclude them from being immunized today. Risks and adverse reactions were discussed and the VIS was given to them. All questions were addressed. She was observed for 5 min post injection. There were no reactions observed.     Amy Bergeron RN

## 2017-11-02 NOTE — MR AVS SNAPSHOT
Visit Information Date & Time Provider Department Dept. Phone Encounter #  
 11/2/2017  9:30 AM Jay Marshall MD [facility] [id number] Follow-up Instructions Return in about 6 months (around 5/2/2018). Upcoming Health Maintenance Date Due Hepatitis C Screening 1965 Pneumococcal 19-64 Medium Risk (1 of 1 - PPSV23) 8/26/1983 DTaP/Tdap/Td series (1 - Tdap) 8/26/1985 PAP AKA CERVICAL CYTOLOGY 7/19/2014 FOBT Q 1 YEAR AGE 50-75 8/26/2014 INFLUENZA AGE 9 TO ADULT 8/1/2017 BREAST CANCER SCRN MAMMOGRAM 11/14/2017 Allergies as of 11/2/2017  Review Complete On: 11/2/2017 By: Jay Marshall MD  
  
 Severity Noted Reaction Type Reactions Percocet [Oxycodone-acetaminophen] Low 02/20/2017   Systemic Nausea Only Current Immunizations  Reviewed on 11/2/2017 Name Date Pneumococcal Polysaccharide (PPSV-23)  Incomplete Reviewed by Jay Marshall MD on 11/2/2017 at  9:50 AM  
You Were Diagnosed With   
  
 Codes Comments Mild intermittent asthma without complication    -  Primary ICD-10-CM: J45.20 ICD-9-CM: 493.90 Encounter for immunization     ICD-10-CM: S04 ICD-9-CM: V03.89 Chronic nonseasonal allergic rhinitis due to pollen     ICD-10-CM: J30.89 ICD-9-CM: 477.0 Bronchiectasis without complication ([facility] 75.)     KYREE-83-MZ: J47.9 ICD-9-CM: 494.0 Vitals BP Pulse Temp Resp Height(growth percentile) Weight(growth percentile) 96/60 (BP 1 Location: Left arm, BP Patient Position: Sitting) 74 99.5 °F (37.5 °C) (Oral) 16 5' 7\" (1.702 m) 128 lb (58.1 kg) LMP SpO2 BMI OB Status 10/20/2011 97% 20.05 kg/m2 Hysterectomy Vitals History BMI and BSA Data Body Mass Index Body Surface Area 20.05 kg/m 2 1.66 m 2 Preferred Pharmacy Pharmacy Name [facility] [address] [facility] [address] Your Updated Medication List  
  
   
This list is accurate as of: 11/2/17 10:07 AM.  Always use your most recent med list.  
  
  
  
  
 fluticasone-vilanterol 100-25 mcg/dose inhaler Commonly known as:  BREO ELLIPTA Take 1 Puff by inhalation daily. MULTIVITAMIN PO Take  by mouth. VENTOLIN HFA 90 mcg/actuation inhaler Generic drug:  albuterol Take 2 Puffs by inhalation every four (4) hours as needed. VITAMIN D3 1,000 unit tablet Generic drug:  cholecalciferol Take  by mouth daily. Prescriptions Sent to Pharmacy Refills  
 fluticasone-vilanterol (BREO ELLIPTA) 100-25 mcg/dose inhaler 6 Sig: Take 1 Puff by inhalation daily. Class: Normal  
 Pharmacy: [place] [address] [address] [facility] Ph #: [phone] Route: Inhalation VENTOLIN HFA 90 mcg/actuation inhaler 6 Sig: Take 2 Puffs by inhalation every four (4) hours as needed. Class: Normal  
 Pharmacy: [place] [address] [address] [facility] Ph #: [phone] Route: Inhalation We Performed the Following PNEUMOCOCCAL POLYSACCHARIDE VACCINE, 23-VALENT, ADULT OR IMMUNOSUPPRESSED PT DOSE, [45514 CPT(R)] Follow-up Instructions Return in about 6 months (around 5/2/2018). Patient Instructions Pneumococcal Polysaccharide Vaccine: What You Need to Know Why get vaccinated? Vaccination can protect older adults (and some children and younger adults) from pneumococcal disease. Pneumococcal disease is caused by bacteria that can spread from person to person through close contact. It can cause ear infections, and it can also lead to more serious infections of the: 
· Lungs (pneumonia), · Blood (bacteremia), and 
· Covering of the brain and spinal cord (meningitis).  Meningitis can cause deafness and brain damage, and it can be fatal. 
Anyone can get pneumococcal disease, but children under 3years of age, people with certain medical conditions, adults over 72years of age, and cigarette smokers are at the highest risk. About 18,000 older adults die each year from pneumococcal disease in the [place]. Treatment of pneumococcal infections with penicillin and other drugs used to be more effective. But some strains of the disease have become resistant to these drugs. This makes prevention of the disease, through vaccination, even more important. Pneumococcal polysaccharide vaccine (PPSV23) Pneumococcal polysaccharide vaccine (PPSV23) protects against 23 types of pneumococcal bacteria. It will not prevent all pneumococcal disease. PPSV23 is recommended for: · All adults 72years of age and older, · Anyone 2 through 59years of age with certain long-term health problems, 
· Anyone 2 through 59years of age with a weakened immune system, 
· Adults 23 through 59years of age who smoke cigarettes or have asthma. Most people need only one dose of PPSV. A second dose is recommended for certain high-risk groups. People 72 and older should get a dose even if they have gotten one or more doses of the vaccine before they turned 65. Your healthcare provider can give you more information about these recommendations. Most healthy adults develop protection within 2 to 3 weeks of getting the shot. Some people should not get this vaccine · Anyone who has had a life-threatening allergic reaction to PPSV should not get another dose. · Anyone who has a severe allergy to any component of PPSV should not receive it. Tell your provider if you have any severe allergies. · Anyone who is moderately or severely ill when the shot is scheduled may be asked to wait until they recover before getting the vaccine. Someone with a mild illness can usually be vaccinated. · Children less than 3years of age should not receive this vaccine.  
· There is no evidence that PPSV is harmful to either a pregnant woman or to her fetus. However, as a precaution, women who need the vaccine should be vaccinated before becoming pregnant, if possible. Risks of a vaccine reaction With any medicine, including vaccines, there is a chance of side effects. These are usually mild and go away on their own, but serious reactions are also possible. About half of people who get PPSV have mild side effects, such as redness or pain where the shot is given, which go away within about two days. Less than 1 out of 100 people develop a fever, muscle aches, or more severe local reactions. Problems that could happen after any vaccine: · People sometimes faint after a medical procedure, including vaccination. Sitting or lying down for about 15 minutes can help prevent fainting, and injuries caused by a fall. Tell your doctor if you feel dizzy, or have vision changes or ringing in the ears. · Some people get severe pain in the shoulder and have difficulty moving the arm where a shot was given. This happens very rarely. · Any medication can cause a severe allergic reaction. Such reactions from a vaccine are very rare, estimated at about 1 in a million doses, and would happen within a few minutes to a few hours after the vaccination. As with any medicine, there is a very remote chance of a vaccine causing a serious injury or death. The safety of vaccines is always being monitored. For more information, visit: www.cdc.gov/vaccinesafety/ What if there is a serious reaction? What should I look for? Look for anything that concerns you, such as signs of a severe allergic reaction, very high fever, or unusual behavior. Signs of a severe allergic reaction can include hives, swelling of the face and throat, difficulty breathing, a fast heartbeat, dizziness, and weakness. These would usually start a few minutes to a few hours after the vaccination. What should I do?  
If you think it is a severe allergic reaction or other emergency that can't wait, call 9-1-1 or get to the nearest hospital. Otherwise, call your doctor. Afterward, the reaction should be reported to the Vaccine Adverse Event Reporting System (VAERS). Your doctor might file this report, or you can do it yourself through the VAERS web site at www.vaers. [employer].gov, or by calling 5-402.846.7977. VAERS does not give medical advice. How can I learn more? · Ask your doctor. He or she can give you the vaccine package insert or suggest other sources of information. · Call your local or state health department. · Contact the Centers for Disease Control and Prevention (CDC): 
¨ Call 9-238.464.2056 (1-800-CDC-INFO) or ¨ Visit CDC's website at www.cdc.gov/vaccines Vaccine Information Statement PPSV Vaccine 
(04/24/2015) Department of Health and [employer] Centers for Disease Control and Prevention Many Vaccine Information Statements are available in [place] and other languages. See www.immunize.org/vis. Hojas de información Sobre Vacunas están disponibles en español y en muchos otros idiomas. Visite Skyler.si. Care instructions adapted under license by [employer] (which disclaims liability or warranty for this information). If you have questions about a medical condition or this instruction, always ask your healthcare professional. [address] any warranty or liability for your use of this information. Introducing [address]! Dear Anya Art: Thank you for requesting a [employer] account. Our records indicate that you already have an active [employer] account. You can access your account anytime at [email or web address]. Skinny Mom/Shareable Social Did you know that you can access your hospital and ER discharge instructions at any time in [employer]? You can also review all of your test results from your hospital stay or ER visit. Additional Information If you have questions, please visit the Frequently Asked Questions section of the [facility] website at [email or web address]. [email or web address] Remember, [facility] is NOT to be used for urgent needs. For medical emergencies, dial 911. Now available from your iPhone and Android! Please provide this summary of care documentation to your next provider. Your primary care clinician is listed as Claudette Jerry. If you have any questions after today's visit, please call [phone].

## 2017-11-02 NOTE — PATIENT INSTRUCTIONS
Pneumococcal Polysaccharide Vaccine: What You Need to Know  Why get vaccinated? Vaccination can protect older adults (and some children and younger adults) from pneumococcal disease. Pneumococcal disease is caused by bacteria that can spread from person to person through close contact. It can cause ear infections, and it can also lead to more serious infections of the:  · Lungs (pneumonia),  · Blood (bacteremia), and  · Covering of the brain and spinal cord (meningitis). Meningitis can cause deafness and brain damage, and it can be fatal.  Anyone can get pneumococcal disease, but children under 3years of age, people with certain medical conditions, adults over 72years of age, and cigarette smokers are at the highest risk. About 18,000 older adults die each year from pneumococcal disease in the [place]. Treatment of pneumococcal infections with penicillin and other drugs used to be more effective. But some strains of the disease have become resistant to these drugs. This makes prevention of the disease, through vaccination, even more important. Pneumococcal polysaccharide vaccine (PPSV23)  Pneumococcal polysaccharide vaccine (PPSV23) protects against 23 types of pneumococcal bacteria. It will not prevent all pneumococcal disease. PPSV23 is recommended for:  · All adults 72years of age and older,  · Anyone 2 through 59years of age with certain long-term health problems,  · Anyone 2 through 59years of age with a weakened immune system,  · Adults 23 through 59years of age who smoke cigarettes or have asthma. Most people need only one dose of PPSV. A second dose is recommended for certain high-risk groups. People 72 and older should get a dose even if they have gotten one or more doses of the vaccine before they turned 65. Your healthcare provider can give you more information about these recommendations. Most healthy adults develop protection within 2 to 3 weeks of getting the shot.   Some people should not get this vaccine  · Anyone who has had a life-threatening allergic reaction to PPSV should not get another dose. · Anyone who has a severe allergy to any component of PPSV should not receive it. Tell your provider if you have any severe allergies. · Anyone who is moderately or severely ill when the shot is scheduled may be asked to wait until they recover before getting the vaccine. Someone with a mild illness can usually be vaccinated. · Children less than 3years of age should not receive this vaccine. · There is no evidence that PPSV is harmful to either a pregnant woman or to her fetus. However, as a precaution, women who need the vaccine should be vaccinated before becoming pregnant, if possible. Risks of a vaccine reaction  With any medicine, including vaccines, there is a chance of side effects. These are usually mild and go away on their own, but serious reactions are also possible. About half of people who get PPSV have mild side effects, such as redness or pain where the shot is given, which go away within about two days. Less than 1 out of 100 people develop a fever, muscle aches, or more severe local reactions. Problems that could happen after any vaccine:  · People sometimes faint after a medical procedure, including vaccination. Sitting or lying down for about 15 minutes can help prevent fainting, and injuries caused by a fall. Tell your doctor if you feel dizzy, or have vision changes or ringing in the ears. · Some people get severe pain in the shoulder and have difficulty moving the arm where a shot was given. This happens very rarely. · Any medication can cause a severe allergic reaction. Such reactions from a vaccine are very rare, estimated at about 1 in a million doses, and would happen within a few minutes to a few hours after the vaccination. As with any medicine, there is a very remote chance of a vaccine causing a serious injury or death.   The safety of vaccines is always being monitored. For more information, visit: www.cdc.gov/vaccinesafety/  What if there is a serious reaction? What should I look for? Look for anything that concerns you, such as signs of a severe allergic reaction, very high fever, or unusual behavior. Signs of a severe allergic reaction can include hives, swelling of the face and throat, difficulty breathing, a fast heartbeat, dizziness, and weakness. These would usually start a few minutes to a few hours after the vaccination. What should I do? If you think it is a severe allergic reaction or other emergency that can't wait, call 9-1-1 or get to the nearest hospital. Otherwise, call your doctor. Afterward, the reaction should be reported to the Vaccine Adverse Event Reporting System (VAERS). Your doctor might file this report, or you can do it yourself through the VAERS web site at www.vaers. [employer].gov, or by calling 0-248.647.8443. VAERS does not give medical advice. How can I learn more? · Ask your doctor. He or she can give you the vaccine package insert or suggest other sources of information. · Call your local or state health department. · Contact the Centers for Disease Control and Prevention (CDC):  ¨ Call 3-223.715.2995 (1-800-CDC-INFO) or  ¨ Visit CDC's website at www.cdc.gov/vaccines  Vaccine Information Statement  PPSV Vaccine  (04/24/2015)  Department of Health and Human Services  Centers for Disease Control and Prevention  Many Vaccine Information Statements are available in Cypriot and other languages. See www.immunize.org/vis. Hojas de información Sobre Vacunas están disponibles en español y en muchos otros idiomas. Visite Skyler.si. Care instructions adapted under license by [employer] (which disclaims liability or warranty for this information).  If you have questions about a medical condition or this instruction, always ask your healthcare professional. Tevin Solano any warranty or liability for your use of this information.

## 2017-11-02 NOTE — COMMUNICATION BODY
[facility]  Pulmonary, Critical Care, and Sleep Medicine      Pulmonary Office visit. Name: Marilyn Caldera     : 1965     Date: 2017        Subjective:     Patient is a 47 y.o. female initially referred for Hemoptysis . Now followed for bronchiectasis. 16    Has been feeling well since last visit. Denies any persistent cough, mucus, hemoptysis  Denies SOB or wheezing- uses rescue inhaler infrequently  Using Breo daily. No fever, chills. Has been in same workspace for past 10 years but recent  Exacerbations . Denies any change in home environment. HPI:  Patient reports being in good health and about 2 months back had an episode of spitting up blood X4 in an overnight period prompting visit to MD. She was prescribed antibiotics and improved. Further investigations with CXR and CT scan  Obtained with abnormalities on CT- Pulmonary consulted. She had since not had any further hemoptysis until 3 days back when she coughed up blood tinged mucus. She reports having a need to cough and clear her throat -usually in am. Denies any chronic cough. Denies any wheezing. Never diagnosed with asthma. She has seasonal increased nasal congestion, drainage  And postnasal drip- takes OTC Zrytec. Denies any fever, chills, weight loss. C/o chest pain- after eating certain foods. She has never smoked. No work in any industrial setting. She grew up in [address] [place] [address] and moved to [facility] [facility] as an adult. No travel to [place] [address].  She has a dog at home. No h/o childhood respiratory illness- whooping cough. No known TB exposure. Allergies   Allergen Reactions    Percocet [Oxycodone-Acetaminophen] Nausea Only     Current Outpatient Prescriptions   Medication Sig Dispense Refill    fluticasone-vilanterol (BREO ELLIPTA) 100-25 mcg/dose inhaler Take 1 Puff by inhalation daily.  1 Inhaler 6    VENTOLIN HFA 90 mcg/actuation inhaler Take 2 Puffs by inhalation every four (4) hours as needed. 1 Inhaler 6    MULTIVITAMIN PO Take  by mouth.  cholecalciferol, vitamin d3, (VITAMIN D) 1,000 unit tablet Take  by mouth daily. Review of Systems:  HEENT: No epistaxis, no nasal drainage, no difficulty in swallowing, no redness in eyes  Respiratory: as above  Cardiovascular: no chest pain, no palpitations, no chronic leg edema, no syncope  Gastrointestinal: no abd pain, no vomiting, no diarrhea, no bleeding symptoms  Genitourinary: No urinary symptoms or hematuria  Integument/breast: No ulcers or rashes  Musculoskeletal:Neg  Neurological: No focal weakness, no seizures, no headaches  Behvioral/Psych: No anxiety, no depression  Constitutional: No fever, no chills, no weight loss, no night sweats     Objective:     Visit Vitals    BP 96/60 (BP 1 Location: Left arm, BP Patient Position: Sitting)    Pulse 74    Temp 99.5 °F (37.5 °C) (Oral)    Resp 16    Ht 5' 7\" (1.702 m)    Wt 58.1 kg (128 lb)    LMP 10/20/2011    SpO2 97%    BMI 20.05 kg/m2        Physical Exam:   General: comfortable, no acute distress  HEENT: pupils reactive, sclera anicteric, EOM intact  Neck: No adenopathy or thyroid swelling, no lymphadenopathy or JVD, supple  CVS: S1S2 no murmurs  RS: Mod AE bilaterally , Bilateral ronchi with expiratory wheezing,, no tactile fremitus or egophony, no accessory muscle use  Abd: soft, non tender, no hepatosplenomegaly  Neuro: non focal, awake, alert  Extrm: no leg edema, clubbing or cyanosis  Skin: no rash    Data review:    IgE- WNL  Allergy profile-    D. pteronyssinus <0.10  Class 0 kU/L Final     D. farinae Mite <0.10  Class 0 kU/L Final     Cat Hair/Dander <0.10  Class 0 kU/L Final     Dog Hair/Dander <0.10  Class 0 kU/L Final     Bermuda Grass 4.18 (A) Class IV kU/L Final     Chapin grass 4.19 (A) Class IV kU/L Final     Umair Grass 2.79 (A) Class III kU/L Final     Bahia Grass 6.48 (A) Class IV kU/L Final     T061-ODT COCKROACH, AMERICAN <0.10  Class 0 kU/L Final     Penicillium notatum <0.10  Class 0 kU/L Final     Cladosporium herbarum <0.10  Class 0 kU/L Final     Aspergillus fumigatus <0.10  Class 0 kU/L Final     Mucor racemosus <0.10  Class 0 kU/L Final     Alternaria tenuis <0.10  Class 0 kU/L Final     Stemphylium botryosum <0.10  Class 0 kU/L Final     D369-ANG COMMON SILVER BIRCH 0.37 (A) Class I kU/L Final     [place] 1.67 (A) Class III kU/L Final     American White Elm 0.30 (A) Class 0/I kU/L Final     Maple/Box Elder 0.38 (A) Class I kU/L Final     White Hickory 0.70 (A) Class II kU/L Final     K712-OEU MAPLE LEAF [place] 0.20 (A) Class 0/I kU/L Final     White [place] <0.10  Class 0 kU/L Final     Sweet Gum 0.25 (A) Class 0/I kU/L Final     Mountain Calumet 0.23 (A) Class 0/I kU/L Final     Ragweed, Short/Common 0.34 (A) Class I kU/L Final     Mugwort 0.19 (A) Class 0/I kU/L Final     Plantain, English 0.23 (A) Class 0/I kU/L Final     Pigweed, Rough 0.26 (A) Class 0/I kU/L Final     Sheep Sorrel (Dock) 0.31 (A) Class 0/I kU/L Final     Nettle 0.14 (A) Class 0/I kU/L Final       Narrative      Imaging:  I have personally reviewed the patients radiographs and have reviewed the reports and images:  CT scan of chest ( [facility]) reviewed images- biapical pleural thickening, apical parenchymal scarring and RML bronchiectatic changes with scattered infiltrates, RUL bronchiectatic changes     IMPRESSION:   · Bronchiectasis- predominant location in RML with some RUL distribution and biapical pleuroparenchymal infiltrates/fibrosis suggests a sequelae of remote inflammatory/granulomatous process. Currently compensated symptoms-no recent bronchitis or hemoptysis   · CT scan (2016) with some scattered areas of infiltrates ? Secondary to above  · Hemoptysis secondary to above  · Reactive airways disease- allergic vs secondary to above.  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 Repeat 10/2017- normal range. RECOMMENDATIONS:     · Continue  Breo-ellipta , prn albuterol  · Discussed strict allergen avoidance- grasses, trees. · Encouraged use of mask if outdoors in spring/fall  · Strict GERD precautions and add PPI  · Discussed at length diagnosis and treatment plan- written instructions given. · Will consider allergen therapy if unable to control with current  interventions  · Discussed new lab results- specifically IgG subclass ( now normal)  · Discussed vigilance and checking out for infectious /allergic triggers in work space  · Preventive vaccinations- Pneumovax PPSV23 today. · Will get Influenza vaccine at work- Nov 13  · Will follow up in 6 months to monitor progress       Health maintenance screens deferred to Primary care provider.      Love Morgan MD

## 2017-11-02 NOTE — LETTER
2017 10:20 AM 
 
Patient:  Sabina Medina YOB: 1965 Date of Visit: 2017 Dear Evy Desai MD 
[address] [place] [address] VIA Facsimile: [phone] 
 : Thank you for referring Ms. Refugia Opitz to me for evaluation/treatment. Below are the relevant portions of my assessment and plan of care. [facility] Pulmonary, Critical Care, and Sleep Medicine Pulmonary Office visit. Name: Sabina Medina : 1965 Date: 2017 Subjective:  
 
Patient is a 47 y.o. female initially referred for Hemoptysis . Now followed for bronchiectasis. 17 Has been feeling well since last visit. Denies any persistent cough, mucus, hemoptysis Denies SOB or wheezing- uses rescue inhaler infrequently Using Michelle Eduardo daily. No fever, chills. Has been in same workspace for past 10 years but recent  Exacerbations . Denies any change in home environment. HPI: 
Patient reports being in good health and about 2 months back had an episode of spitting up blood X4 in an overnight period prompting visit to MD. She was prescribed antibiotics and improved. Further investigations with CXR and CT scan  Obtained with abnormalities on CT- Pulmonary consulted. She had since not had any further hemoptysis until 3 days back when she coughed up blood tinged mucus. She reports having a need to cough and clear her throat -usually in am. Denies any chronic cough. Denies any wheezing. Never diagnosed with asthma. She has seasonal increased nasal congestion, drainage  And postnasal drip- takes OTC Zrytec. Denies any fever, chills, weight loss. C/o chest pain- after eating certain foods. She has never smoked. No work in any industrial setting. She grew up in [address] [place] [address] 1 and moved to [place] as an adult. No travel to [place] [address]. 
She has a dog at home. No h/o childhood respiratory illness- whooping cough. No known TB exposure. Allergies Allergen Reactions  Percocet [Oxycodone-Acetaminophen] Nausea Only Current Outpatient Prescriptions Medication Sig Dispense Refill  fluticasone-vilanterol (BREO ELLIPTA) 100-25 mcg/dose inhaler Take 1 Puff by inhalation daily. 1 Inhaler 6  
 VENTOLIN HFA 90 mcg/actuation inhaler Take 2 Puffs by inhalation every four (4) hours as needed. 1 Inhaler 6  MULTIVITAMIN PO Take  by mouth.  cholecalciferol, vitamin d3, (VITAMIN D) 1,000 unit tablet Take  by mouth daily. Review of Systems: 
HEENT: No epistaxis, no nasal drainage, no difficulty in swallowing, no redness in eyes Respiratory: as above Cardiovascular: no chest pain, no palpitations, no chronic leg edema, no syncope Gastrointestinal: no abd pain, no vomiting, no diarrhea, no bleeding symptoms Genitourinary: No urinary symptoms or hematuria Integument/breast: No ulcers or rashes Musculoskeletal:Neg 
Neurological: No focal weakness, no seizures, no headaches Behvioral/Psych: No anxiety, no depression Constitutional: No fever, no chills, no weight loss, no night sweats Objective:  
 
Visit Vitals  BP 96/60 (BP 1 Location: Left arm, BP Patient Position: Sitting)  Pulse 74  Temp 99.5 °F (37.5 °C) (Oral)  Resp 16  
 Ht 5' 7\" (1.702 m)  Wt 58.1 kg (128 lb)  LMP 10/20/2011  SpO2 97%  BMI 20.05 kg/m2 Physical Exam:  
General: comfortable, no acute distress HEENT: pupils reactive, sclera anicteric, EOM intact Neck: No adenopathy or thyroid swelling, no lymphadenopathy or JVD, supple CVS: S1S2 no murmurs RS: Mod AE bilaterally , Bilateral ronchi with expiratory wheezing,, no tactile fremitus or egophony, no accessory muscle use Abd: soft, non tender, no hepatosplenomegaly Neuro: non focal, awake, alert Extrm: no leg edema, clubbing or cyanosis Skin: no rash Data review: IgE- WNL Allergy profile- 
   D. pteronyssinus <0.10  Class 0 kU/L Final  
  D. farinae Mite <0.10  Class 0 kU/L Final  
  Cat Hair/Dander <0.10  Class 0 kU/L Final  
  Dog Hair/Dander <0.10  Class 0 kU/L Final  
  Bermuda Grass 4.18 (A) Class IV kU/L Final  
  Chapin grass 4.19 (A) Class IV kU/L Final  
  Umair Grass 2.79 (A) Class III kU/L Final  
  Bahia Grass 6.48 (A) Class IV kU/L Final  
  U805-[id number] COCKROACH, AMERICAN <0.10  Class 0 kU/L Final  
  Penicillium notatum <0.10  Class 0 kU/L Final  
  Cladosporium herbarum <0.10  Class 0 kU/L Final  
  Aspergillus fumigatus <0.10  Class 0 kU/L Final  
  Mucor racemosus <0.10  Class 0 kU/L Final  
  Alternaria tenuis <0.10  Class 0 kU/L Final  
  Stemphylium botryosum <0.10  Class 0 kU/L Final  
  [id number] COMMON SILVER BIRCH 0.37 (A) Class I kU/L Final  
  Colby 1.67 (A) Class III kU/L Final  
  American White Elm 0.30 (A) Class 0/I kU/L Final  
  Maple/Box Elder 0.38 (A) Class I kU/L Final  
  White Hickory 0.70 (A) Class II kU/L Final  
  [id number] MAPLE LEAF [place]ORE 0.20 (A) Class 0/I kU/L Final  
  White [place] <0.10  Class 0 kU/L Final  
  Sweet Gum 0.25 (A) Class 0/I kU/L Final  
  Mountain [place] 0.23 (A) Class 0/I kU/L Final  
  Ragweed, Short/Common 0.34 (A) Class I kU/L Final  
  Mugwort 0.19 (A) Class 0/I kU/L Final  
  Plantain, English 0.23 (A) Class 0/I kU/L Final  
  Pigweed, Rough 0.26 (A) Class 0/I kU/L Final  
  Sheep Sorrel (Dock) 0.31 (A) Class 0/I kU/L Final  
  Nettle 0.14 (A) Class 0/I kU/L Final  
   
Narrative Imaging: 
I have personally reviewed the patients radiographs and have reviewed the reports and images: CT scan of chest ( [facility]) reviewed images- biapical pleural thickening, apical parenchymal scarring and RML bronchiectatic changes with scattered infiltrates, RUL bronchiectatic changes IMPRESSION:  
· Bronchiectasis- predominant location in RML with some RUL distribution and biapical pleuroparenchymal infiltrates/fibrosis suggests a sequelae of remote inflammatory/granulomatous process. Currently compensated symptoms-no recent bronchitis or hemoptysis · CT scan (2016) with some scattered areas of infiltrates ? Secondary to above · Hemoptysis secondary to above · Reactive airways disease- allergic vs secondary to above. 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 Repeat 10/2017- normal range. RECOMMENDATIONS:  
 
· Continue  Breo-ellipta , prn albuterol · Discussed strict allergen avoidance- grasses, trees. · Encouraged use of mask if outdoors in spring/fall · Strict GERD precautions and add PPI · Discussed at length diagnosis and treatment plan- written instructions given. · Will consider allergen therapy if unable to control with current  interventions · Discussed new lab results- specifically IgG subclass ( now normal) · Discussed vigilance and checking out for infectious /allergic triggers in work space · Preventive vaccinations- Pneumovax PPSV23 today. · Will get Influenza vaccine at work- Nov 13 · Will follow up in 6 months to monitor progress Health maintenance screens deferred to Primary care provider. Delmi Ramírez MD 
 
 
 
 
 
 
If you have questions, please do not hesitate to call me. I look forward to following Ms. Elliott along with you.  
 
 
 
Sincerely, 
 
 
Delmi Ramírez MD

## 2018-03-14 ENCOUNTER — HOSPITAL ENCOUNTER (OUTPATIENT)
Dept: PHYSICAL THERAPY | Age: 53
Discharge: HOME | End: 2018-03-14
Payer: COMMERCIAL

## 2018-03-14 PROCEDURE — 97161 PT EVAL LOW COMPLEX 20 MIN: CPT

## 2018-03-14 PROCEDURE — 97110 THERAPEUTIC EXERCISES: CPT

## 2018-03-14 NOTE — PROGRESS NOTES
[facility] [address], [address] [place] [address], [address],[place] [address] [phone] fax      Plan of Care/ Statement of Necessity for Physical Therapy Services    Patient name: Luna Love Start of Care: 3/14/2018   Referral source: Radha Guy MD : 1965    Medical Diagnosis: Left shoulder pain [M25.512]   Onset Date:2018    Treatment Diagnosis: decrease tolerance to ADLs and activities due to Pain and Decrease ROM, and strength left shoulder   Prior Hospitalization: see medical history Provider#: [id number]   Medications: Verified on Patient summary List    Comorbidities:bronchiectasis   Prior Level of Function: I all areas of ADLs and activities, no L shoulder issues, work demands, household chores, community activities     The Plan of Care and following information is based on the information from the initial evaluation. Assessment/ key information: 48 YO female diagnosed as above and with S/S consistent with above diagnosis presents to skilled outpatient PT. CCO Left shoulder pain sudden shoulder movement reaching to touch someone cause pain later in the day 2018, then 2 days later reaching for seat belt caused increased pain. Pain 0/81 WORSE with certain movements,  BETTER immobility, ice, naprosyn. . She is right hand dominant. Previous Treatment/Compliance: ice, naprosyn, injecetion. AROM left shoulder F 40 pain, Abd 55 pain, ER 73 pain, IR abdomen PROM left shoulder Flexion 90 , abd 100. MMT left shoulder NA due to pain and LOM, End feels are tight and painful especially Left shoulder F and abd, left shoulder able to eccentrically lower with pain and guarding.  Left UE HBH pinch , decreased ease, hand to ear level,    HBB to infrascapular level and with decreased ease,+ neers, + hunt, + empty can,   Patient demonstrates the potential to make gains with improved ROM, strength, endurance/activity tolerance, functional FOTO survey score  and all within a reasonable time frame so as to increase their functional independence with ADLs and activities for carryover to quality of life, tolerance household chores, work demands and community activities. Patient requires skilled Physical Therapy so as to monitor their response to and modify their treatment plan accordingly. Patient appears to be an appropriate candidate for skilled outpatient Physical Therapy.        Evaluation Complexity History LOW Complexity : Zero comorbidities / personal factors that will impact the outcome / POC; Examination MEDIUM Complexity : 3 Standardized tests and measures addressing body structure, function, activity limitation and / or participation in recreation  ;Presentation LOW Complexity : Stable, uncomplicated  ;Clinical Decision Making MEDIUM Complexity : FOTO score of 26-74  Overall Complexity Rating: LOW   Problem List: pain affecting function, decrease ROM, decrease strength, decrease ADL/ functional abilitiies, decrease activity tolerance, decrease flexibility/ joint mobility and other FOTO 53   Treatment Plan may include any combination of the following: Therapeutic exercise, Therapeutic activities, Neuromuscular re-education, Physical agent/modality, Manual therapy and Patient education  Patient / Family readiness to learn indicated by: asking questions, trying to perform skills and interest  Persons(s) to be included in education: patient (P)  Barriers to Learning/Limitations: None  Patient Goal (s):  full mobility of the Left shoulder without pain  Patient Self Reported Health Status: good  Rehabilitation Potential: good    Short Term Goals: To be accomplished in 5 treatments:   1 patient will have established and be I with HEP to aid with progression of skilled PT Program   EVAL issued   CURRENT   2 patient will have FOTO 60 to show improved function and tolerance to ADLs   EVAL 53   CURRENT    Long Term Goals:  To be accomplished in 6- 12 treatments:   1  patient will have FOTO 60 to show improved function and tolerance to ADLs   EVAL 53   CURRENT   2 patient will have AROM Left shoulder F and  to aid with increase tolerance to ADLS and household chores   EVAL Left shoulder F 40  abd 55   CURRENT   3 patient will have MMT Left shoulder all planes 4 to aid with increase tolerance to ADLS and activiites   EVAL NA due to pain and LOM   CURRENT   4 patient will have overall 50% improvement to aid with increase tolerance to ADLs and lifting and carrying   EVAL  Pain with activities   CURRENT    Frequency / Duration: Patient to be seen 2-3 times per week for 6-12 treatments. Patient/ Caregiver education and instruction: Diagnosis, prognosis, self care, activity modification and exercises   [x]  Plan of care has been reviewed with SONIA Lott, PT 3/14/2018 8:54 AM  ________________________________________________________________________    I certify that the above Therapy Services are being furnished while the patient is under my care. I agree with the treatment plan and certify that this therapy is necessary.     [de-identified] Signature:____________________  Date:____________Time: _________    Please sign and return to [facility] [address] [place] [address], [address] [place] [address], [address] [place] [address],[facility] 300  [phone] fax

## 2018-03-14 NOTE — PROGRESS NOTES
PT DAILY TREATMENT NOTE/SHOULDER EVAL 3-16    Patient Name: Lacie Rogers  Date:3/14/2018  : 1965  [x]  Patient  Verified  Payor: Nitesh Hauser / Plan: [employer] JEANINE PPO / Product Type: PPO /    In time:814  Out time:855  Total Treatment Time (min): 41  Total Timed Codes (min): 8  1:1 Treatment Time ( only): 8   Visit #: 1 of     Treatment Area: Left shoulder pain [M25.512]    SUBJECTIVE  Pain Level (0-10 scale):  2 with improvement  []constant [x]intermittent [x]improving []worsening []no change since onset  WORSE with certain movements,  BETTER immobility, ice, naprosyn  Any medication changes, allergies to medications, adverse drug reactions, diagnosis change, or new procedure performed?: [x] No    [] Yes (see summary sheet for update)  Subjective functional status/changes:     PLOF: I all areas of ADLs and activities, no L shoulder issues, work demands, household chores, community activities  Limitations to PLOF: pain   Mechanism of Injury: sudden shoulder movement reaching to touch someone cause pain later in the day 2018, then 2 days later reaching for seat belt caused increased pain  Current symptoms/Complaints: 48 YO female diagnosed as above and with S/S consistent with above diagnosis presents to skilled outpatient PT. CCO Left shoulder pain sudden shoulder movement reaching to touch someone cause pain later in the day 2018, then 2 days later reaching for seat belt caused increased pain. Pain  WORSE with certain movements,  BETTER immobility, ice, naprosyn. . She is right hand dominant.   Previous Treatment/Compliance: ice, naprosyn, injecetion  PMHx/Surgical Hx: bronchiectasis  Work Hx:full time Administrative  Living Situation: lives in a 2 story house, not alone  Pt Goals: full mobility of the Left shoulder without pain  Barriers: [x]pain []financial []time []transportation []other  Motivation: good  Substance use: []Alcohol []Tobacco []other:   FABQ Score: []low []elevate  Cognition: A & O x4   Other:    OBJECTIVE/EXAMINATION  Domestic Life: work, household, community  Activity/Recreational Limitations: pain  Mobility: I  Self Care: I        Modality rationale:     Min Type Additional Details    [] Estim:  []Unatt       []IFC  []Premod                        []Other:  []w/ice   []w/heat  Position:  Location:    [] Estim: []Att    []TENS instruct  []NMES                    []Other:  []w/US   []w/ice   []w/heat  Position:  Location:    []  Traction: [] Cervical       []Lumbar                       [] Prone          []Supine                       []Intermittent   []Continuous Lbs:  [] before manual  [] after manual    []  Ultrasound: []Continuous   [] Pulsed                           []1MHz   []3MHz Location:  W/cm2:    []  Iontophoresis with dexamethasone         Location: [] Take home patch   [] In clinic    []  Ice     []  heat  []  Ice massage  []  Laser   []  Anodyne Position:  Location:    []  Laser with stim  []  Other: Position:  Location:    []  Vasopneumatic Device Pressure:       [] lo [] med [] hi   Temperature: [] lo [] med [] hi   [] Skin assessment post-treatment:  []intact []redness- no adverse reaction    []redness  adverse reaction:     33 min [x]Eval                  []Re-Eval       8 min Therapeutic Exercise:  [] See flow sheet :   Rationale: increase ROM, increase strength and improve coordination to improve the patients ability to aid with increase tolerance to ADLS and activities     min Therapeutic Activity:  []  See flow sheet :   Rationale:   to improve the patients ability to       min Neuromuscular Re-education:  []  See flow sheet :   Rationale:   to improve the patients ability to      min Manual Therapy:     Rationale:  to      min Gait Training:  ___ feet with ___ device on level surfaces with ___ level of assist   Rationale:           With   [] TE   [] TA   [] neuro   [] other: Patient Education: [x] Review HEP    [] Progressed/Changed HEP based on:   [] positioning   [] body mechanics   [] transfers   [] heat/ice application    [] other:      Other Objective/Functional Measures:     Physical Therapy Evaluation - Shoulder    Posture: [] Poor    [] Fair    [x] Good    Describe:    Right UE HBH and HBB to scapula with no difficulty  Left UE HBH pinch , decreased ease, hand to ear level,    HBB to infrascapular level and with decreased    ROM:  [] Unable to assess at this time                                           AROM                                                              PROM   Left Right  Left Right   Flexion 40 P 160 Flexion 90    Extension   Extension     Scaption/ABD 55 P 170 Scaptin/    ER @ 0 Degrees 73 P 90 ER @ 0 Degrees     ER @ 90 Degrees   ER @ 90 Degrees     IR @ 0 Degrees abdomen abdomen IR @ 90 Degrees       End Feel / Painful Arc:painful, tight left F and Abd    Strength:   [x] Unable to assess at this time                                                                            L (1-5) R (1-5) Pain   Flexors NA 5 [] Yes   [] No   Abductors NA 5 [] Yes   [] No   External Rotators NA 4+,5 [] Yes   [] No   Internal Rotators NA 4+,5 [] Yes   [] No   Supraspinatus   [] Yes   [] No   Serratus Anterior   [] Yes   [] No   Lower Trapezius   [] Yes   [] No   Elbow Flexion   [] Yes   [] No   Elbow Extension   [] Yes   [] No       Scapulohumoral Control / Rhythm:  Able to eccentrically lower with good control?  Left: [x] Yes with pain and guarding   [] No     Right: [x] Yes   [] No    Accessory Motions:    Palpation  [x] Min  [x] Mod  [] Severe    Location:[facility] Left shoulder  [] Min  [] Mod  [] Severe    Location:  [] Min  [] Mod  [] Severe    Location:    Optional Tests:    Sensation Left Right Reflexes Left Right   Biceps (C5)   Biceps (C5)     Lundberg Radial(C6-7)   Brachioradialis (C6)     Lundberg Ulnar(C8-T1)   Triceps (C7)       Adson's Test  [] Pos   [] Neg Yergason's Test [] Pos   [] Neg  Michaelle's Test  [] Pos   [] Neg Concepción's Sign [] Pos   [] Neg  Neer's Test  [x] Pos   [] Neg Clunk Test  [] Pos   [] Neg  Hawkin's Test  [x] Pos   [] Neg AC Joint  [] Pos   [] Neg  Speed's Test  [] Pos   [] Neg SC Joint  [] Pos   [] Neg  Empty Can  [x] Pos   [] Neg Pectoral Tightness [] Pos   [] Neg  Anterior Apprehension [] Pos   [] Neg   Posterior Apprehension [] Pos   [] Neg      Other Tests / Comments:        Pain Level (0-10 scale) post treatment: 4    ASSESSMENT/Changes in Function:Patient demonstrates the potential to make gains with improved ROM, strength, endurance/activity tolerance, functional FOTO survey score  and all within a reasonable time frame so as to increase their functional independence with ADLs and activities for carryover to quality of life, tolerance household chores, work demands and community activities. Patient requires skilled Physical Therapy so as to monitor their response to and modify their treatment plan accordingly. Patient appears to be an appropriate candidate for skilled outpatient Physical Therapy. Patient will continue to benefit from skilled PT services to modify and progress therapeutic interventions, address functional mobility deficits, address ROM deficits, address strength deficits, analyze and address soft tissue restrictions, analyze and cue movement patterns, analyze and modify body mechanics/ergonomics, assess and modify postural abnormalities and instruct in home and community integration to attain remaining goals.      [x]  See Plan of Care  []  See progress note/recertification  []  See Discharge Summary         Progress towards goals / Updated goals:       PLAN  [x]  Upgrade activities as tolerated     [x]  Continue plan of care  []  Update interventions per flow sheet       []  Discharge due to:_  []  Other:_      Jett Keith, PT 3/14/2018  8:15 AM

## 2018-03-15 ENCOUNTER — OFFICE VISIT (OUTPATIENT)
Dept: PULMONOLOGY | Age: 53
End: 2018-03-15

## 2018-03-15 VITALS
HEART RATE: 69 BPM | BODY MASS INDEX: 19.93 KG/M2 | HEIGHT: 67 IN | SYSTOLIC BLOOD PRESSURE: 108 MMHG | DIASTOLIC BLOOD PRESSURE: 70 MMHG | TEMPERATURE: 98.6 F | RESPIRATION RATE: 16 BRPM | OXYGEN SATURATION: 99 % | WEIGHT: 127 LBS

## 2018-03-15 DIAGNOSIS — J47.9 BRONCHIECTASIS WITHOUT COMPLICATION (HCC): ICD-10-CM

## 2018-03-15 DIAGNOSIS — J30.89 CHRONIC NONSEASONAL ALLERGIC RHINITIS DUE TO POLLEN: ICD-10-CM

## 2018-03-15 DIAGNOSIS — R04.2 HEMOPTYSIS: Primary | ICD-10-CM

## 2018-03-15 RX ORDER — FLUTICASONE PROPIONATE 50 MCG
2 SPRAY, SUSPENSION (ML) NASAL
COMMUNITY

## 2018-03-15 NOTE — LETTER
3/15/2018 3:50 PM 
 
Patient:  Lacie Rogers YOB: 1965 Date of Visit: 3/15/2018 Dear Zita Valdez MD 
[address] [place] [address] VIA Facsimile: [phone] 
 : Thank you for referring Ms. Justyna White to me for evaluation/treatment. Below are the relevant portions of my assessment and plan of care. [facility] Pulmonary, Critical Care, and Sleep Medicine Pulmonary Office visit. Name: Lacie Rogers : 1965 Date: 3/15/2018 Subjective:  
 
Patient is a 47 y.o. female initially referred for Hemoptysis . Now followed for bronchiectasis. comes for follow up with c/o recent recurring hemoptysis 03/15/18 Has had several episodes of hemoptysis in Feb and now again in March- antonio red blood. Occasionally mixed with mucus. Denies any smell Denies fever Had HVAC changed due to \"spores\" No humidifier- air is dry Denies any persistent cough, mucus, Denies SOB or wheezing- uses rescue inhaler infrequently Using Liberty daily. No fever, chills. HPI: 
Patient reports being in good health and about 2 months back had an episode of spitting up blood X4 in an overnight period prompting visit to MD. She was prescribed antibiotics and improved. Further investigations with CXR and CT scan  Obtained with abnormalities on CT- Pulmonary consulted. She had since not had any further hemoptysis until 3 days back when she coughed up blood tinged mucus. She reports having a need to cough and clear her throat -usually in am. Denies any chronic cough. Denies any wheezing. Never diagnosed with asthma. She has seasonal increased nasal congestion, drainage  And postnasal drip- takes OTC Zrytec. Denies any fever, chills, weight loss. C/o chest pain- after eating certain foods. She has never smoked. No work in any industrial setting. She grew up in [address] [place] [address] and moved to [place] as an adult. No travel to [place] [address]. 
She has a dog at home. No h/o childhood respiratory illness- whooping cough. No known TB exposure. Allergies Allergen Reactions  Percocet [Oxycodone-Acetaminophen] Nausea Only Current Outpatient Prescriptions Medication Sig Dispense Refill  fluticasone (FLONASE ALLERGY RELIEF) 50 mcg/actuation nasal spray 2 Sprays by Both Nostrils route daily.  fluticasone-vilanterol (BREO ELLIPTA) 100-25 mcg/dose inhaler Take 1 Puff by inhalation daily. 1 Inhaler 6  
 VENTOLIN HFA 90 mcg/actuation inhaler Take 2 Puffs by inhalation every four (4) hours as needed. 1 Inhaler 6  cholecalciferol, vitamin d3, (VITAMIN D) 1,000 unit tablet Take  by mouth daily.  MULTIVITAMIN PO Take  by mouth. Review of Systems: 
HEENT: No epistaxis, no nasal drainage, no difficulty in swallowing, no redness in eyes Respiratory: as above Cardiovascular: no chest pain, no palpitations, no chronic leg edema, no syncope Gastrointestinal: no abd pain, no vomiting, no diarrhea, no bleeding symptoms Genitourinary: No urinary symptoms or hematuria Integument/breast: No ulcers or rashes Musculoskeletal:Neg 
Neurological: No focal weakness, no seizures, no headaches Behvioral/Psych: No anxiety, no depression Constitutional: No fever, no chills, no weight loss, no night sweats Objective:  
 
Visit Vitals  /70 (BP 1 Location: Left arm, BP Patient Position: Sitting)  Pulse 69  Temp 98.6 °F (37 °C) (Oral)  Resp 16  
 Ht 5' 7\" (1.702 m)  Wt 57.6 kg (127 lb)  LMP 10/20/2011  SpO2 99% Comment: RA Rest  
 BMI 19.89 kg/m2 Physical Exam:  
General: comfortable, no acute distress HEENT: pupils reactive, sclera anicteric, EOM intact Neck: No adenopathy or thyroid swelling, no lymphadenopathy or JVD, supple CVS: S1S2 no murmurs RS: Mod AE bilaterally , Bilateral ronchi with expiratory wheezing,, no tactile fremitus or egophony, no accessory muscle use Abd: soft, non tender, no hepatosplenomegaly Neuro: non focal, awake, alert Extrm: no leg edema, clubbing or cyanosis Skin: no rash Data review: IgE- WNL Allergy profile- 
  D. pteronyssinus <0.10  Class 0 kU/L Final  
  D. farinae Mite <0.10  Class 0 kU/L Final  
  Cat Hair/Dander <0.10  Class 0 kU/L Final  
  Dog Hair/Dander <0.10  Class 0 kU/L Final  
  Berm[place] Grass 4.18 (A) Class IV kU/L Final  
  Chapin grass 4.19 (A) Class IV kU/L Final  
  Umair Grass 2.79 (A) Class III kU/L Final  
  Bahia Grass 6.48 (A) Class IV kU/L Final  
  G533-CDP COCKROACH, AMERICAN <0.10  Class 0 kU/L Final  
  Penicillium notatum <0.10  Class 0 kU/L Final  
  Cladosporium herbarum <0.10  Class 0 kU/L Final  
  Aspergillus fumigatus <0.10  Class 0 kU/L Final  
  Mucor racemosus <0.10  Class 0 kU/L Final  
  Alternaria tenuis <0.10  Class 0 kU/L Final  
  Stemphylium botryosum <0.10  Class 0 kU/L Final  
  V390-PBC COMMON SILVER BIRCH 0.37 (A) Class I kU/L Final  
  [place] 1.67 (A) Class III kU/L Final  
  American White Elm 0.30 (A) Class 0/I kU/L Final  
  Maple/Box Elder 0.38 (A) Class I kU/L Final  
  White Hickory 0.70 (A) Class II kU/L Final  
  G845-PNG MAPLE LEAF SYCAMORE 0.20 (A) Class 0/I kU/L Final  
  [place] Grass <0.10  Class 0 kU/L Final  
  Sweet Gum 0.25 (A) Class 0/I kU/L Final  
  Mountain [place] 0.23 (A) Class 0/I kU/L Final  
  Ragweed, Short/Common 0.34 (A) Class I kU/L Final  
  Mugwort 0.19 (A) Class 0/I kU/L Final  
  Plantain, English 0.23 (A) Class 0/I kU/L Final  
  Pigweed, Rough 0.26 (A) Class 0/I kU/L Final  
  Sheep Sorrel (Dock) 0.31 (A) Class 0/I kU/L Final  
  Nettle 0.14 (A) Class 0/I kU/L Final  
   
Narrative Imaging: 
I have personally reviewed the patients radiographs and have reviewed the reports and images: CXR Results  (Last 48 hours) None CT scan of chest ( [facility]) reviewed images- biapical pleural thickening, apical parenchymal scarring and RML bronchiectatic changes with scattered infiltrates, RUL bronchiectatic changes IMPRESSION:  
· Bronchiectasis- recent recurring hemoptysis- ? Superinfection with atypical mycobacteria. Predominant location in RML with some RUL distribution and biapical pleuroparenchymal infiltrates/fibrosis suggests a sequelae of remote inflammatory/granulomatous process. · CT scan (2016) with some scattered areas of infiltrates ? Secondary to above · Hemoptysis secondary to above · Reactive airways disease- allergic vs secondary to above. 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 Repeat 10/2017- normal range. RECOMMENDATIONS:  
 
· Will check HRCT and sputum for cultures and AFB · May need bronchoscopy to evaluate further to identify location of bleeding · Continue  Breo-ellipta , prn albuterol · Instructed to humidify ambient atmosphere- cool mist humidifier · Discussed strict allergen avoidance- grasses, trees. · Encouraged use of mask if outdoors in spring/fall · Strict GERD precautions and add PPI · Discussed at length diagnosis and treatment plan- written instructions given. · Will consider allergen therapy if unable to control with current  interventions · Discussed new lab results- specifically IgG subclass ( now normal) · Discussed vigilance and checking out for infectious /allergic triggers in work space · Preventive vaccinations- Pneumovax PPSV23 up to date · Will get Influenza vaccine- recieved · Will follow up in 3 weeks to monitor progress Health maintenance screens deferred to Primary care provider.  
  
Pamela Knott MD 
 
 
 
 
 
 
 If you have questions, please do not hesitate to call me. I look forward to following MsCorey Earl along with you.  
 
 
 
Sincerely, 
 
 
Alex Hess MD

## 2018-03-15 NOTE — MR AVS SNAPSHOT
[address] [facility], [address] [address] 
[phone] Patient: Ligia Hernandez MRN: [id number] :1965 Visit Information Date & Time Provider Department Dept. Phone Encounter #  
 3/15/2018  1:45 PM MD [facility]avanaugh [id number] Follow-up Instructions Return in about 3 weeks (around 2018). Your Appointments 2018  3:45 PM  
Follow Up with Taylor Quintero MD  
[address] ([address]) Appt Note: FROM 3/15/18  
 [address] [place] [address], [address] [address]  
[phone]  
  
   
 [address] [place] [address], [address] [facility] [facility]  [address] [place] [address] Upcoming Health Maintenance Date Due Hepatitis C Screening 1965 DTaP/Tdap/Td series (1 - Tdap) 1985 PAP AKA CERVICAL CYTOLOGY 2014 FOBT Q 1 YEAR AGE 50-75 2014 Influenza Age 5 to Adult 2017 BREAST CANCER SCRN MAMMOGRAM 2017 Allergies as of 3/15/2018  Review Complete On: 3/15/2018 By: Taylor Quintero MD  
  
 Severity Noted Reaction Type Reactions Percocet [Oxycodone-acetaminophen] Low 2017   Systemic Nausea Only Current Immunizations  Reviewed on 2017 Name Date Influenza Vaccine (Quad) 2015 12:00 AM  
 Pneumococcal Polysaccharide (PPSV-23) 2017 10:05 AM  
  
 Not reviewed this visit You Were Diagnosed With   
  
 Codes Comments Hemoptysis    -  Primary ICD-10-CM: R04.2 ICD-9-CM: 786.30 Bronchiectasis without complication ([facility] [address]     AEN-94-XT: J47.9 ICD-9-CM: 494.0 Chronic nonseasonal allergic rhinitis due to pollen     ICD-10-CM: J30.89 ICD-9-CM: 477.0 Vitals BP Pulse Temp Resp Height(growth percentile) Weight(growth percentile) 108/70 (BP 1 Location: Left arm, BP Patient Position: Sitting) 69 98.6 °F (37 °C) (Oral) 16 5' 7\" (1.702 m) 127 lb (57.6 kg) LMP SpO2 BMI OB Status Smoking Status 10/20/2011 99% 19.89 kg/m2 Hysterectomy Never Smoker BMI and BSA Data Body Mass Index Body Surface Area  
 19.89 kg/m 2 1.65 m 2 Preferred Pharmacy Pharmacy Name Phone [address] [place] [address] [phone] Your Updated Medication List  
  
   
This list is accurate as of 3/15/18  2:44 PM.  Always use your most recent med list.  
  
  
  
  
 FLONASE ALLERGY RELIEF 50 mcg/actuation nasal spray Generic drug:  fluticasone 2 Sprays by Both Nostrils route daily. fluticasone-vilanterol 100-25 mcg/dose inhaler Commonly known as:  BREO ELLIPTA Take 1 Puff by inhalation daily. MULTIVITAMIN PO Take  by mouth. VENTOLIN HFA 90 mcg/actuation inhaler Generic drug:  albuterol Take 2 Puffs by inhalation every four (4) hours as needed. VITAMIN D3 1,000 unit tablet Generic drug:  cholecalciferol Take  by mouth daily. Follow-up Instructions Return in about 3 weeks (around 4/5/2018). To-Do List   
 03/15/2018 Microbiology:  AFB CULTURE + SMEAR W/RFLX ID FROM CULTURE   
  
 03/15/2018 Imaging:  CT CHEST WO CONT   
  
 03/15/2018 Microbiology:  CULTURE, RESPIRATORY/SPUTUM/BRONCH Roberto Bock STAIN   
  
 03/15/2018 Imaging:  XR CHEST PA LAT   
  
 03/20/2018 8:00 AM  
  Appointment with Dank Guillen PT at [facility] ([phone])  
  
 03/23/2018 7:30 AM  
  Appointment with Dank Guillen PT at [facility] ([phone])  
  
 03/28/2018 8:00 AM  
  Appointment with Isaac Serrano at [facility] ([phone])  
  
 03/30/2018 8:00 AM  
  Appointment with Dank Guillen PT at [facility] ([phone])  
  
 03/31/2018 9:00 AM  
  Appointment with MEL HERNANDEZ 1 at [address] [place] [address] ([phone]) OUTSIDE FILMS  - Any outside films related to the study being scheduled should be brought with you on the day of the exam.  If this cannot be done there may be a delay in the reading of the study. MEDICATIONS  - Patient must bring a complete list of all medications currently taking to include prescriptions, over-the-counter meds, herbals, vitamins & any dietary supplements  GENERAL INSTRUCTIONS  - On the day of your exam do not use any bath powder, deodorant or lotions on the armpit area. -Tenderness of breasts may cause an increase of discomfort during procedure. If you are experiencing breast tenderness on the day of your appointment and would like to reschedule, please call [phone].  
  
 04/04/2018 7:30 AM  
  Appointment with Kemar Dallas PT at [facility] ([phone])  
  
 04/06/2018 7:30 AM  
  Appointment with Kemar Dallas PT at [facility] ([phone])  
  
 04/11/2018 7:30 AM  
  Appointment with Kemar Dallas PT at [facility] ([phone])  
  
 04/13/2018 7:30 AM  
  Appointment with Kemar Dallas PT at [facility] ([phone])  
  
 04/18/2018 7:30 AM  
  Appointment with Kemar Dallas PT at [facility] ([phone]) [facility]! Dear Low Benson: Thank you for requesting a Trochet account. Our records indicate that you already have an active Trochet account. You can access your account anytime at [email or web address] Did you know that you can access your hospital and ER discharge instructions at any time in Trochet? You can also review all of your test results from your hospital stay or ER visit. Additional Information If you have questions, please visit the Frequently Asked Questions section of the Trochet website at [email or web address] Remember, Trochet is NOT to be used for urgent needs. For medical emergencies, dial 911. Now available from your iPhone and Android! Please provide this summary of care documentation to your next provider. Your primary care clinician is listed as Dougie Cea. If you have any questions after today's visit, please call [phone].

## 2018-03-15 NOTE — COMMUNICATION BODY
[facility]  Pulmonary, Critical Care, and Sleep Medicine      Pulmonary Office visit. Name: Curly Smart     : 1965     Date: 3/15/2018        Subjective:     Patient is a 47 y.o. female initially referred for Hemoptysis . Now followed for bronchiectasis. comes for follow up with c/o recent recurring hemoptysis    03/15/18      Has had several episodes of hemoptysis in Feb and now again in March- antonio red blood. Occasionally mixed with mucus. Denies any smell  Denies fever  Had HVAC changed due to \"spores\"  No humidifier- air is dry  Denies any persistent cough, mucus,   Denies SOB or wheezing- uses rescue inhaler infrequently  Using Breo daily. No fever, chills. HPI:  Patient reports being in good health and about 2 months back had an episode of spitting up blood X4 in an overnight period prompting visit to MD. She was prescribed antibiotics and improved. Further investigations with CXR and CT scan  Obtained with abnormalities on CT- Pulmonary consulted. She had since not had any further hemoptysis until 3 days back when she coughed up blood tinged mucus. She reports having a need to cough and clear her throat -usually in am. Denies any chronic cough. Denies any wheezing. Never diagnosed with asthma. She has seasonal increased nasal congestion, drainage  And postnasal drip- takes OTC Zrytec. Denies any fever, chills, weight loss. C/o chest pain- after eating certain foods. She has never smoked. No work in any industrial setting. She grew up in [address] [place] [address] and moved to [place] as an adult. No travel to [place] [address].  She has a dog at home. No h/o childhood respiratory illness- whooping cough. No known TB exposure.     Allergies   Allergen Reactions    Percocet [Oxycodone-Acetaminophen] Nausea Only     Current Outpatient Prescriptions   Medication Sig Dispense Refill    fluticasone (FLONASE ALLERGY RELIEF) 50 mcg/actuation nasal spray 2 Sprays by Both Nostrils route daily.  fluticasone-vilanterol (BREO ELLIPTA) 100-25 mcg/dose inhaler Take 1 Puff by inhalation daily. 1 Inhaler 6    VENTOLIN HFA 90 mcg/actuation inhaler Take 2 Puffs by inhalation every four (4) hours as needed. 1 Inhaler 6    cholecalciferol, vitamin d3, (VITAMIN D) 1,000 unit tablet Take  by mouth daily.  MULTIVITAMIN PO Take  by mouth. Review of Systems:  HEENT: No epistaxis, no nasal drainage, no difficulty in swallowing, no redness in eyes  Respiratory: as above  Cardiovascular: no chest pain, no palpitations, no chronic leg edema, no syncope  Gastrointestinal: no abd pain, no vomiting, no diarrhea, no bleeding symptoms  Genitourinary: No urinary symptoms or hematuria  Integument/breast: No ulcers or rashes  Musculoskeletal:Neg  Neurological: No focal weakness, no seizures, no headaches  Behvioral/Psych: No anxiety, no depression  Constitutional: No fever, no chills, no weight loss, no night sweats     Objective:     Visit Vitals    /70 (BP 1 Location: Left arm, BP Patient Position: Sitting)    Pulse 69    Temp 98.6 °F (37 °C) (Oral)    Resp 16    Ht 5' 7\" (1.702 m)    Wt 57.6 kg (127 lb)    LMP 10/20/2011    SpO2 99%  Comment: RA Rest    BMI 19.89 kg/m2        Physical Exam:   General: comfortable, no acute distress  HEENT: pupils reactive, sclera anicteric, EOM intact  Neck: No adenopathy or thyroid swelling, no lymphadenopathy or JVD, supple  CVS: S1S2 no murmurs  RS: Mod AE bilaterally , Bilateral ronchi with expiratory wheezing,, no tactile fremitus or egophony, no accessory muscle use  Abd: soft, non tender, no hepatosplenomegaly  Neuro: non focal, awake, alert  Extrm: no leg edema, clubbing or cyanosis  Skin: no rash    Data review:    IgE- WNL  Allergy profile-    D. pteronyssinus <0.10  Class 0 kU/L Final     D. farinae Mite <0.10  Class 0 kU/L Final     Cat Hair/Dander <0.10  Class 0 kU/L Final     Dog Hair/Dander <0.10  Class 0 kU/L Final     Bharti [place] Grass 4.18 (A) Class IV kU/L Final     Chapin grass 4.19 (A) Class IV kU/L Final     Umair Grass 2.79 (A) Class III kU/L Final     Bahia Grass 6.48 (A) Class IV kU/L Final     K931-AJI COCKROACH, AMERICAN <0.10  Class 0 kU/L Final     Penicillium notatum <0.10  Class 0 kU/L Final     Cladosporium herbarum <0.10  Class 0 kU/L Final     Aspergillus fumigatus <0.10  Class 0 kU/L Final     Mucor racemosus <0.10  Class 0 kU/L Final     Alternaria tenuis <0.10  Class 0 kU/L Final     Stemphylium botryosum <0.10  Class 0 kU/L Final     L935-XQX COMMON SILVER BIRCH 0.37 (A) Class I kU/L Final     [place] 1.67 (A) Class III kU/L Final     American White Elm 0.30 (A) Class 0/I kU/L Final     Maple/Box Elder 0.38 (A) Class I kU/L Final     White Hickory 0.70 (A) Class II kU/L Final     W142-RYF MAPLE LEAF [place]CAMORE 0.20 (A) Class 0/I kU/L Final     [place] <0.10  Class 0 kU/L Final     Sweet Gum 0.25 (A) Class 0/I kU/L Final     Mountain [place] 0.23 (A) Class 0/I kU/L Final     Ragweed, Short/Common 0.34 (A) Class I kU/L Final     Mugwort 0.19 (A) Class 0/I kU/L Final     Plantain, English 0.23 (A) Class 0/I kU/L Final     Pigweed, Rough 0.26 (A) Class 0/I kU/L Final     Sheep Sorrel (Dock) 0.31 (A) Class 0/I kU/L Final     Nettle 0.14 (A) Class 0/I kU/L Final       Narrative      Imaging:  I have personally reviewed the patients radiographs and have reviewed the reports and images:  CXR Results  (Last 48 hours)    None        CT scan of chest ( [facility]) reviewed images- biapical pleural thickening, apical parenchymal scarring and RML bronchiectatic changes with scattered infiltrates, RUL bronchiectatic changes     IMPRESSION:   · Bronchiectasis- recent recurring hemoptysis- ? Superinfection with atypical mycobacteria. Predominant location in RML with some RUL distribution and biapical pleuroparenchymal infiltrates/fibrosis suggests a sequelae of remote inflammatory/granulomatous process.   · CT scan (2016) with some scattered areas of infiltrates ? Secondary to above  · Hemoptysis secondary to above  · Reactive airways disease- allergic vs secondary to above. 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 Repeat 10/2017- normal range. RECOMMENDATIONS:     · Will check HRCT and sputum for cultures and AFB  · May need bronchoscopy to evaluate further to identify location of bleeding  · Continue  Breo-ellipta , prn albuterol  · Instructed to humidify ambient atmosphere- cool mist humidifier  · Discussed strict allergen avoidance- grasses, trees. · Encouraged use of mask if outdoors in spring/fall  · Strict GERD precautions and add PPI  · Discussed at length diagnosis and treatment plan- written instructions given. · Will consider allergen therapy if unable to control with current  interventions  · Discussed new lab results- specifically IgG subclass ( now normal)  · Discussed vigilance and checking out for infectious /allergic triggers in work space  · Preventive vaccinations- Pneumovax PPSV23 up to date  · Will get Influenza vaccine- recieved  · Will follow up in 3 weeks to monitor progress       Health maintenance screens deferred to Primary care provider.      Mera Aragon MD

## 2018-03-15 NOTE — PROGRESS NOTES
[facility]  Pulmonary, Critical Care, and Sleep Medicine      Pulmonary Office visit. Name: Shahab Degroot     : 1965     Date: 3/15/2018        Subjective:     Patient is a 47 y.o. female initially referred for Hemoptysis . Now followed for bronchiectasis. comes for follow up with c/o recent recurring hemoptysis    03/15/18      Has had several episodes of hemoptysis in Feb and now again in March- antonio red blood. Occasionally mixed with mucus. Denies any smell  Denies fever  Had HVAC changed due to \"spores\"  No humidifier- air is dry  Denies any persistent cough, mucus,   Denies SOB or wheezing- uses rescue inhaler infrequently  Using Breo daily. No fever, chills. HPI:  Patient reports being in good health and about 2 months back had an episode of spitting up blood X4 in an overnight period prompting visit to MD. She was prescribed antibiotics and improved. Further investigations with CXR and CT scan  Obtained with abnormalities on CT- Pulmonary consulted. She had since not had any further hemoptysis until 3 days back when she coughed up blood tinged mucus. She reports having a need to cough and clear her throat -usually in am. Denies any chronic cough. Denies any wheezing. Never diagnosed with asthma. She has seasonal increased nasal congestion, drainage  And postnasal drip- takes OTC Zrytec. Denies any fever, chills, weight loss. C/o chest pain- after eating certain foods. She has never smoked. No work in any industrial setting. She grew up in [address] [place] [address] and moved to [place] as an adult. No travel to [place] [address].  She has a dog at home. No h/o childhood respiratory illness- whooping cough. No known TB exposure.     Allergies   Allergen Reactions    Percocet [Oxycodone-Acetaminophen] Nausea Only     Current Outpatient Prescriptions   Medication Sig Dispense Refill    fluticasone (FLONASE ALLERGY RELIEF) 50 mcg/actuation nasal spray 2 Sprays by Both Nostrils route daily.  fluticasone-vilanterol (BREO ELLIPTA) 100-25 mcg/dose inhaler Take 1 Puff by inhalation daily. 1 Inhaler 6    VENTOLIN HFA 90 mcg/actuation inhaler Take 2 Puffs by inhalation every four (4) hours as needed. 1 Inhaler 6    cholecalciferol, vitamin d3, (VITAMIN D) 1,000 unit tablet Take  by mouth daily.  MULTIVITAMIN PO Take  by mouth. Review of Systems:  HEENT: No epistaxis, no nasal drainage, no difficulty in swallowing, no redness in eyes  Respiratory: as above  Cardiovascular: no chest pain, no palpitations, no chronic leg edema, no syncope  Gastrointestinal: no abd pain, no vomiting, no diarrhea, no bleeding symptoms  Genitourinary: No urinary symptoms or hematuria  Integument/breast: No ulcers or rashes  Musculoskeletal:Neg  Neurological: No focal weakness, no seizures, no headaches  Behvioral/Psych: No anxiety, no depression  Constitutional: No fever, no chills, no weight loss, no night sweats     Objective:     Visit Vitals    /70 (BP 1 Location: Left arm, BP Patient Position: Sitting)    Pulse 69    Temp 98.6 °F (37 °C) (Oral)    Resp 16    Ht 5' 7\" (1.702 m)    Wt 57.6 kg (127 lb)    LMP 10/20/2011    SpO2 99%  Comment: RA Rest    BMI 19.89 kg/m2        Physical Exam:   General: comfortable, no acute distress  HEENT: pupils reactive, sclera anicteric, EOM intact  Neck: No adenopathy or thyroid swelling, no lymphadenopathy or JVD, supple  CVS: S1S2 no murmurs  RS: Mod AE bilaterally , Bilateral ronchi with expiratory wheezing,, no tactile fremitus or egophony, no accessory muscle use  Abd: soft, non tender, no hepatosplenomegaly  Neuro: non focal, awake, alert  Extrm: no leg edema, clubbing or cyanosis  Skin: no rash    Data review:    IgE- WNL  Allergy profile-    D. pteronyssinus <0.10  Class 0 kU/L Final     D. farinae Mite <0.10  Class 0 kU/L Final     Cat Hair/Dander <0.10  Class 0 kU/L Final     Dog Hair/Dander <0.10  Class 0 kU/L Final     Bharti Charlton Grass 4.18 (A) Class IV kU/L Final     Chapin grass 4.19 (A) Class IV kU/L Final     Umair Grass 2.79 (A) Class III kU/L Final     Bahia Grass 6.48 (A) Class IV kU/L Final     [id number] COCKROACH, AMERICAN <0.10  Class 0 kU/L Final     Penicillium notatum <0.10  Class 0 kU/L Final     Cladosporium herbarum <0.10  Class 0 kU/L Final     Aspergillus fumigatus <0.10  Class 0 kU/L Final     Mucor racemosus <0.10  Class 0 kU/L Final     Alternaria tenuis <0.10  Class 0 kU/L Final     Stemphylium botryosum <0.10  Class 0 kU/L Final     K264-[id number] COMMON SILVER BIRCH 0.37 (A) Class I kU/L Final     [place] 1.67 (A) Class III kU/L Final     American White Elm 0.30 (A) Class 0/I kU/L Final     Maple/Box Elder 0.38 (A) Class I kU/L Final     White Hickory 0.70 (A) Class II kU/L Final     [id number] MAPLE LEAF [place] 0.20 (A) Class 0/I kU/L Final     White [place] <0.10  Class 0 kU/L Final     Sweet Gum 0.25 (A) Class 0/I kU/L Final     Mountain [place] 0.23 (A) Class 0/I kU/L Final     Ragweed, Short/Common 0.34 (A) Class I kU/L Final     Mugwort 0.19 (A) Class 0/I kU/L Final     Plantain, English 0.23 (A) Class 0/I kU/L Final     Pigweed, Rough 0.26 (A) Class 0/I kU/L Final     Sheep Sorrel (Dock) 0.31 (A) Class 0/I kU/L Final     Nettle 0.14 (A) Class 0/I kU/L Final       Narrative      Imaging:  I have personally reviewed the patients radiographs and have reviewed the reports and images:  CXR Results  (Last 48 hours)    None        CT scan of chest ( [facility]) reviewed images- biapical pleural thickening, apical parenchymal scarring and RML bronchiectatic changes with scattered infiltrates, RUL bronchiectatic changes     IMPRESSION:   · Bronchiectasis- recent recurring hemoptysis- ? Superinfection with atypical mycobacteria. Predominant location in RML with some RUL distribution and biapical pleuroparenchymal infiltrates/fibrosis suggests a sequelae of remote inflammatory/granulomatous process.   · CT scan (2016) with some scattered areas of infiltrates ? Secondary to above  · Hemoptysis secondary to above  · Reactive airways disease- allergic vs secondary to above. 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 Repeat 10/2017- normal range. RECOMMENDATIONS:     · Will check HRCT and sputum for cultures and AFB  · May need bronchoscopy to evaluate further to identify location of bleeding  · Continue  Breo-ellipta , prn albuterol  · Instructed to humidify ambient atmosphere- cool mist humidifier  · Discussed strict allergen avoidance- grasses, trees. · Encouraged use of mask if outdoors in spring/fall  · Strict GERD precautions and add PPI  · Discussed at length diagnosis and treatment plan- written instructions given. · Will consider allergen therapy if unable to control with current  interventions  · Discussed new lab results- specifically IgG subclass ( now normal)  · Discussed vigilance and checking out for infectious /allergic triggers in work space  · Preventive vaccinations- Pneumovax PPSV23 up to date  · Will get Influenza vaccine- recieved  · Will follow up in 3 weeks to monitor progress       Health maintenance screens deferred to Primary care provider.      Gena Damico MD

## 2018-03-15 NOTE — PROGRESS NOTES
Ms. Lon Colon has a reminder for a \"due or due soon\" health maintenance. I have asked that she contact her primary care provider for follow-up on this health maintenance. Chief Complaint   Patient presents with    Hemoptysis     1. Have you been to the ER, urgent care clinic since your last visit? Hospitalized since your last visit? No    2. Have you seen or consulted any other health care providers outside of the [address] [place] [address] since your last visit? Include any pap smears or colon screening.  No

## 2018-03-19 ENCOUNTER — HOSPITAL ENCOUNTER (OUTPATIENT)
Dept: LAB | Age: 53
Discharge: HOME | End: 2018-03-19
Payer: COMMERCIAL

## 2018-03-19 PROCEDURE — 87070 CULTURE OTHR SPECIMN AEROBIC: CPT | Performed by: INTERNAL MEDICINE

## 2018-03-19 PROCEDURE — 87116 MYCOBACTERIA CULTURE: CPT | Performed by: INTERNAL MEDICINE

## 2018-03-19 PROCEDURE — 87153 DNA/RNA SEQUENCING: CPT | Performed by: INTERNAL MEDICINE

## 2018-03-19 PROCEDURE — 87149 DNA/RNA DIRECT PROBE: CPT | Performed by: INTERNAL MEDICINE

## 2018-03-20 ENCOUNTER — HOSPITAL ENCOUNTER (OUTPATIENT)
Dept: LAB | Age: 53
Discharge: HOME | End: 2018-03-20

## 2018-03-20 ENCOUNTER — HOSPITAL ENCOUNTER (OUTPATIENT)
Dept: PHYSICAL THERAPY | Age: 53
Discharge: HOME | End: 2018-03-20
Payer: COMMERCIAL

## 2018-03-20 ENCOUNTER — DOCUMENTATION ONLY (OUTPATIENT)
Dept: PULMONOLOGY | Age: 53
End: 2018-03-20

## 2018-03-20 PROCEDURE — 97140 MANUAL THERAPY 1/> REGIONS: CPT

## 2018-03-20 PROCEDURE — 99001 SPECIMEN HANDLING PT-LAB: CPT | Performed by: INTERNAL MEDICINE

## 2018-03-20 PROCEDURE — 97110 THERAPEUTIC EXERCISES: CPT

## 2018-03-20 NOTE — PROGRESS NOTES
Per Hema Jhaveri pt is scheduled at [address] [place] [address] on 3/27/18 at 8am for HRCT. Order faxed to [phone]. Per Hema Jhaveri they are cancelling Ct that was scheduled at [facility].

## 2018-03-20 NOTE — PROGRESS NOTES
PT DAILY TREATMENT NOTE     Patient Name: Amara Marquez  Date:3/20/2018  : 1965  [x]  Patient  Verified  Payor: [facility] / Plan: [employer] / Product Type: HMO /    In time:749  Out time:831  Total Treatment Time (min): 41  Visit #: 2 of     Treatment Area: Left shoulder pain [M25.512]    SUBJECTIVE  Pain Level (0-10 scale): 0  Any medication changes, allergies to medications, adverse drug reactions, diagnosis change, or new procedure performed?: [x] No    [] Yes (see summary sheet for update)  Subjective functional status/changes:   [] No changes reported  It is doing a lot better today.      OBJECTIVE    Modality rationale: decrease inflammation and post exercise recovery to improve the patients ability to aid with increase tolerance to ADLS and activities   Min Type Additional Details    [] Estim:  []Unatt       []IFC  []Premod                        []Other:  []w/ice   []w/heat  Position:  Location:    [] Estim: []Att    []TENS instruct  []NMES                    []Other:  []w/US   []w/ice   []w/heat  Position:  Location:    []  Traction: [] Cervical       []Lumbar                       [] Prone          []Supine                       []Intermittent   []Continuous Lbs:  [] before manual  [] after manual    []  Ultrasound: []Continuous   [] Pulsed                           []1MHz   []3MHz W/cm2:  Location:    []  Iontophoresis with dexamethasone         Location: [] Take home patch   [] In clinic   10 [x]  Ice  post   []  heat  []  Ice massage  []  Laser   []  Anodyne Position:reclined  Location:Left shoulder    []  Laser with stim  []  Other:  Position:  Location:    []  Vasopneumatic Device Pressure:       [] lo [] med [] hi   Temperature: [] lo [] med [] hi   [] Skin assessment post-treatment:  []intact []redness- no adverse reaction    []redness  adverse reaction:      min []Eval                  []Re-Eval       23 min Therapeutic Exercise:  [x] See flow sheet :   Rationale: increase ROM, increase strength and improve coordination to improve the patients ability to aid with  Increase tolerance to ADLS and activities     min Therapeutic Activity:  []  See flow sheet :   Rationale:   to improve the patients ability to       min Neuromuscular Re-education:  []  See flow sheet :   Rationale:   to improve the patients ability to     8 min Manual Therapy:  PROM , Gentle LAD , oscill Left shoulder   Rationale: increase ROM and increase tissue extensibility to aid with increase tolerance to ADLS and activities     min Gait Training:  ___ feet with ___ device on level surfaces with ___ level of assist   Rationale: With   [] TE   [] TA   [] neuro   [] other: Patient Education: [x] Review HEP    [] Progressed/Changed HEP based on:   [] positioning   [] body mechanics   [] transfers   [] heat/ice application    [] other:      Other Objective/Functional Measures: VC exercises and tech. Observed increased ease and amounts of AROM left shoulder with UBE, Pulley, wand in standing. Min tightness reported with PROM full abd overhead. Pain Level (0-10 scale) post treatment: 0    ASSESSMENT/Changes in Function: First full day back and tolerated well. Increased relaxation with manual today. Patient will continue to benefit from skilled PT services to modify and progress therapeutic interventions, address functional mobility deficits, address ROM deficits, address strength deficits, analyze and address soft tissue restrictions, analyze and cue movement patterns, analyze and modify body mechanics/ergonomics, assess and modify postural abnormalities and instruct in home and community integration to attain remaining goals. [x]  See Plan of Care  []  See progress note/recertification  []  See Discharge Summary         Progress towards goals / Updated goals:  Short Term Goals:  To be accomplished in 5 treatments:                         1 patient will have established and be I with HEP to aid with progression of skilled PT Program                         EVAL issued                         CURRENT met 3/20/18                         2 patient will have FOTO 60 to show improved function and tolerance to ADLs                         EVAL 53                         CURRENT    Long Term Goals:  To be accomplished in 6- 12 treatments:                         1  patient will have FOTO 60 to show improved function and tolerance to ADLs                         EVAL 53                         CURRENT                         2 patient will have AROM Left shoulder F and  to aid with increase tolerance to ADLS and household chores                         EVAL Left shoulder F 40  abd 55                         CURRENT                         3 patient will have MMT Left shoulder all planes 4 to aid with increase tolerance to ADLS and activiites                         EVAL NA due to pain and LOM                         CURRENT                         4 patient will have overall 50% improvement to aid with increase tolerance to ADLs and lifting and carrying                         EVAL  Pain with activities                         CURRENT    PLAN  [x]  Upgrade activities as tolerated     [x]  Continue plan of care  []  Update interventions per flow sheet       []  Discharge due to:_  []  Other:_      Isela Hill, PT 3/20/2018  7:52 AM    Future Appointments  Date Time Provider Alvarez Perdomo   3/20/2018 8:00 AM Isela Hill, PT [facility] [address]   3/23/2018 7:30 AM Isela Hill, PT [facility] [address]   3/23/2018 2:00 PM HBV CT RM 1 [facility]   3/28/2018 8:00 AM Rony Jacobs [facility] [address]   3/30/2018 8:00 AM Isela Hill, PT [facility] [address]   3/31/2018 9:00 AM [facility] ALEXANDER RM 1 [facility]   4/4/2018 7:30 AM Isela Hill PT [facility] [address]   4/6/2018 7:30 AM Isela Hill PT [facility] [address]   4/11/2018 7:30 AM Isela Hill, PT [facility] [address]   4/13/2018 7:30 AM Isela Hill, PT [facility] [address]   4/18/2018 7:30 AM Isela Hill, PT [facility]   5/9/2018 3:45 PM Abdi Kuhn MD [address]

## 2018-03-21 ENCOUNTER — TELEPHONE (OUTPATIENT)
Dept: PULMONOLOGY | Age: 53
End: 2018-03-21

## 2018-03-22 LAB
BACTERIA SPEC CULT: NORMAL
GRAM STN SPEC: NORMAL
SERVICE CMNT-IMP: NORMAL

## 2018-03-23 ENCOUNTER — HOSPITAL ENCOUNTER (OUTPATIENT)
Dept: PHYSICAL THERAPY | Age: 53
Discharge: HOME | End: 2018-03-23
Payer: COMMERCIAL

## 2018-03-23 PROCEDURE — 97140 MANUAL THERAPY 1/> REGIONS: CPT

## 2018-03-23 PROCEDURE — 97110 THERAPEUTIC EXERCISES: CPT

## 2018-03-23 NOTE — PROGRESS NOTES
PT DAILY TREATMENT NOTE     Patient Name: Yobany Richardson  Date:3/23/2018  : 1965  [x]  Patient  Verified  Payor: Santino Nicole / Plan: Sukhdeep Lares RPN / Product Type: Commerical /    In time:735  Out time:818  Total Treatment Time (min): 52  Visit #: 3 of     Treatment Area: Left shoulder pain [M25.512]    SUBJECTIVE  Pain Level (0-10 scale): 0  Any medication changes, allergies to medications, adverse drug reactions, diagnosis change, or new procedure performed?: [x] No    [] Yes (see summary sheet for update)  Subjective functional status/changes:   [] No changes reported  It is still feeling better.      OBJECTIVE    Modality rationale: increase tissue extensibility and post exercise recovery to improve the patients ability to aid with increase tolerance to ADLs and  activities   Min Type Additional Details    [] Estim:  []Unatt       []IFC  []Premod                        []Other:  []w/ice   []w/heat  Position:  Location:    [] Estim: []Att    []TENS instruct  []NMES                    []Other:  []w/US   []w/ice   []w/heat  Position:  Location:    []  Traction: [] Cervical       []Lumbar                       [] Prone          []Supine                       []Intermittent   []Continuous Lbs:  [] before manual  [] after manual    []  Ultrasound: []Continuous   [] Pulsed                           []1MHz   []3MHz W/cm2:  Location:    []  Iontophoresis with dexamethasone         Location: [] Take home patch   [] In clinic   10 [x]  Ice  post   []  heat  []  Ice massage  []  Laser   []  Anodyne Position:reclined  Location:Left shoulder    []  Laser with stim  []  Other:  Position:  Location:    []  Vasopneumatic Device Pressure:       [] lo [] med [] hi   Temperature: [] lo [] med [] hi   [] Skin assessment post-treatment:  []intact []redness- no adverse reaction    []redness  adverse reaction:       min []Eval                  []Re-Eval       29 min Therapeutic Exercise:  [x] See flow sheet : Rationale: increase ROM, increase strength and improve coordination to improve the patients ability to aid with increase tolerance to ADLs and activities     min Therapeutic Activity:  []  See flow sheet :   Rationale:   to improve the patients ability to       min Neuromuscular Re-education:  []  See flow sheet :   Rationale:   to improve the patients ability to     13 min Manual Therapy:  LAD, Oscill, PROM , RS AI all planes   Rationale: decrease pain, increase ROM, increase tissue extensibility and increase strength to aid with increase tolerance to ADLs and activities     min Gait Training:  ___ feet with ___ device on level surfaces with ___ level of assist   Rationale: With   [] TE   [] TA   [] neuro   [] other: Patient Education: [x] Review HEP    [] Progressed/Changed HEP based on:   [] positioning   [] body mechanics   [] transfers   [] heat/ice application    [] other:      Other Objective/Functional Measures: increased UBE to 1.5, added pulley abduction, increased wall wash and shrugs and pinches    Pain Level (0-10 scale) post treatment: 0    ASSESSMENT/Changes in Function: marie well. Continues with decrease pain and increase tolerance to increased reps    Patient will continue to benefit from skilled PT services to modify and progress therapeutic interventions, address functional mobility deficits, address ROM deficits, address strength deficits, analyze and address soft tissue restrictions, analyze and cue movement patterns, analyze and modify body mechanics/ergonomics, assess and modify postural abnormalities and instruct in home and community integration to attain remaining goals.      [x]  See Plan of Care  []  See progress note/recertification  []  See Discharge Summary         Progress towards goals / Updated goals:  Short Term Goals: To be accomplished in 5 treatments:                         8 patient will have established and be I with HEP to aid with progression of skilled PT Program                         EVAL issued                         GIQBSYH met 3/20/18   3/23/18                         2 patient will have FOTO 60 to show improved function and tolerance to ADLs                         EVAL 53                         CURRENT    Long Term Goals: To be accomplished in 6- 12 treatments:                         2  patient will have FOTO 60 to show improved function and tolerance to ADLs                         EVAL 53                         CURRENT                         2 patient will have AROM Left shoulder F and  to aid with increase tolerance to ADLS and household chores                         EVAL Left shoulder F 40  abd 55                         CURRENT                         3 patient will have MMT Left shoulder all planes 4 to aid with increase tolerance to ADLS and activiites                         EVAL NA due to pain and LOM                         CURRENT                         4 patient will have overall 50% improvement to aid with increase tolerance to ADLs and lifting and carrying                         EVAL  Pain with activities                         CURRENT       PLAN  [x]  Upgrade activities as tolerated     [x]  Continue plan of care  []  Update interventions per flow sheet       []  Discharge due to:_  [x]  Other:_  Add Tband    Altagracia Ahumada, PT 3/23/2018  7:38 AM    Future Appointments  Date Time Provider Alvarez Perdomo   3/28/2018 8:00 AM Lianna Murphy [facility]   3/30/2018 8:00 AM Altagracia Ahumada, PT [facility]   3/31/2018 9:00 AM [facility] ALEXANDER RM 1 [facility]   4/4/2018 7:30 AM Altagracia Ahumada, PT [facility]   4/6/2018 7:30 AM Altagracia Ahumada, PT [facility]   4/11/2018 7:30 AM Altagracia Dianaia, PT [facility]   4/13/2018 7:30 AM Altagracia Auhmada, PT [facility]   4/18/2018 7:30 AM Altagracia Ahumada, PT [facility]   5/9/2018 3:45 PM Dale Dwyer MD [address]

## 2018-03-28 ENCOUNTER — APPOINTMENT (OUTPATIENT)
Dept: PHYSICAL THERAPY | Age: 53
End: 2018-03-28
Payer: COMMERCIAL

## 2018-03-29 ENCOUNTER — TELEPHONE (OUTPATIENT)
Dept: PULMONOLOGY | Age: 53
End: 2018-03-29

## 2018-03-29 NOTE — TELEPHONE ENCOUNTER
Spoke with pt. She had the CT done at [facility]. Imaging. She states they where to send a copy of the results which are in the doctors box but no cd. Pt to pickup the disc and drop by the office tomorrow so Dr. Eliezer Hill will have to review.

## 2018-03-29 NOTE — TELEPHONE ENCOUNTER
Pt had a ct recently (Report in VAP's Box). She would like for someone to give her a aida with her results.  [phone]

## 2018-03-30 ENCOUNTER — APPOINTMENT (OUTPATIENT)
Dept: PHYSICAL THERAPY | Age: 53
End: 2018-03-30
Payer: COMMERCIAL

## 2018-03-30 DIAGNOSIS — B44.81 ABPA (ALLERGIC BRONCHOPULMONARY ASPERGILLOSIS) (HCC): Primary | ICD-10-CM

## 2018-03-30 NOTE — PROGRESS NOTES
CT scan report- [facility]:  RML air bronchograms- ? RML syndrome  RUL 8x7 mm nodule- GGO  RUL branching nodule    Will order follow up Ct - 6 months. Sputum AFB- negative -1 specimen, cultures - no significant isolate    Recommend:  Await further sputum AFB's  Will check aspergillus ag  Will consider bronchoscopy.

## 2018-03-31 ENCOUNTER — HOSPITAL ENCOUNTER (OUTPATIENT)
Dept: MAMMOGRAPHY | Age: 53
Discharge: HOME | End: 2018-03-31
Attending: OBSTETRICS & GYNECOLOGY
Payer: COMMERCIAL

## 2018-03-31 DIAGNOSIS — Z12.31 VISIT FOR SCREENING MAMMOGRAM: ICD-10-CM

## 2018-03-31 PROCEDURE — 77063 BREAST TOMOSYNTHESIS BI: CPT

## 2018-04-04 ENCOUNTER — APPOINTMENT (OUTPATIENT)
Dept: PHYSICAL THERAPY | Age: 53
End: 2018-04-04

## 2018-04-06 ENCOUNTER — APPOINTMENT (OUTPATIENT)
Dept: PHYSICAL THERAPY | Age: 53
End: 2018-04-06

## 2018-04-10 NOTE — PROGRESS NOTES
[facility] [address], [address] [place] [address], [address]  [phone] fax    Discharge Summary    Patient name: Danyel Melendez     Start of Care: 3/14/18  Referral source: Mirza Montgomery MD    : 1965  Medical/Treatment Diagnosis: Left shoulder pain [M25.512]  Onset Date:3/1/18  Prior Hospitalization: see medical history   Provider#: [id number]  Comorbidities:bronchiectasis   Prior Level of Function: I all areas of ADLs and activities, no L shoulder issues, work demands, household chores, community activities      Medications: Verified on Patient Summary List    Visits from [place] of Care: 3    Missed Visits: 0  Reporting Period : 3/14/18 to 3/23/18      Summary of Care:Patient was seen for 3 skilled sessions. She had exercises for her HEP. She did not repeat her functional survey. She should follow up with the MD as needed. She appeared to be responding positively.   Gladys Acosta will have established and be I with HEP to aid with progression of skilled PT Program  Status at last note/certification:eval  Status at discharge: met    Goal:patient will have FOTO 60 to show improved function and tolerance to ADLs  Status at last note/certification:eval  Status at discharge: not met, progressing    Goal:patient will have AROM Left shoulder F and  to aid with increase tolerance to ADLS and household chores   Status at last note/certification:eval  Status at discharge: not met, progressing    Goal: patient will have MMT Left shoulder all planes 4 to aid with increase tolerance to ADLS and activiites  Status at last note/certification:eval  Status at discharge: not met, not reassessed      ASSESSMENT/RECOMMENDATIONS:  []Discontinue therapy progressing towards or have reached established goals  []Discontinue therapy due to lack of appreciable progress towards goals  []Discontinue therapy due to lack of attendance or compliance  [x]Other:Patient requested DC due to insurance issues  Thank you for this referral.     Darryl Lenz, PT 4/10/2018 8:30 AM

## 2018-04-11 ENCOUNTER — APPOINTMENT (OUTPATIENT)
Dept: PHYSICAL THERAPY | Age: 53
End: 2018-04-11

## 2018-04-13 ENCOUNTER — APPOINTMENT (OUTPATIENT)
Dept: PHYSICAL THERAPY | Age: 53
End: 2018-04-13

## 2018-04-15 ENCOUNTER — DOCUMENTATION ONLY (OUTPATIENT)
Dept: PULMONOLOGY | Age: 53
End: 2018-04-15

## 2018-04-15 NOTE — PROGRESS NOTES
Preliminary AFB positive at 2 weeks- likely atypical. Will await final results and discuss with patient.

## 2018-04-17 ENCOUNTER — TELEPHONE (OUTPATIENT)
Dept: PULMONOLOGY | Age: 53
End: 2018-04-17

## 2018-04-17 NOTE — TELEPHONE ENCOUNTER
Pt called because she got results recently and she thought that the nurse had told her that she didn't need to be seen for 6 months. She wants to know if she needs to keep her may appt or not.  Please call [phone] this is her work number

## 2018-04-17 NOTE — TELEPHONE ENCOUNTER
Spoke with pt today after reviewing last office note. Pt has appt 5/9/18 and will come for follow up that day.

## 2018-04-18 ENCOUNTER — APPOINTMENT (OUTPATIENT)
Dept: PHYSICAL THERAPY | Age: 53
End: 2018-04-18

## 2018-04-20 DIAGNOSIS — R04.2 HEMOPTYSIS: ICD-10-CM

## 2018-04-20 DIAGNOSIS — J47.9 BRONCHIECTASIS WITHOUT COMPLICATION (HCC): ICD-10-CM

## 2018-04-20 LAB
ACID FAST STN SPEC: NEGATIVE
M AVIUM CMPLX RRNA SPEC QL PROBE: NEGATIVE
M GORDONAE RRNA SPEC QL PROBE: NORMAL
M KANSASII RRNA SPEC QL PROBE: NORMAL
M TB CMPLX RRNA SPEC QL PROBE: NEGATIVE
MYCOBACTERIUM SPEC QL CULT: POSITIVE
OTHER, AFR6: NORMAL
SOURCE, RSRC55: NORMAL
SPECIMEN PREPARATION: ABNORMAL
SPECIMEN SOURCE: ABNORMAL

## 2018-04-23 LAB
OTHER MICROORG DNA SPEC NAA+PROBE: ABNORMAL
SOURCE, RSRC70: ABNORMAL
SUSCEPTIBILITY TESTING, RSUS2T: ABNORMAL

## 2018-04-24 ENCOUNTER — TELEPHONE (OUTPATIENT)
Dept: PULMONOLOGY | Age: 53
End: 2018-04-24

## 2018-04-24 DIAGNOSIS — Z51.81 THERAPEUTIC DRUG MONITORING: Primary | ICD-10-CM

## 2018-04-24 NOTE — TELEPHONE ENCOUNTER
Pt states she had rescheduled her apt, b/c she thought she didn't need to be seen right away, but she got her results on mychart for her culture and thinks she needs an antibiotic or something. Please call [phone].

## 2018-04-25 PROBLEM — A31.0 MYCOBACTERIUM KANSASII INFECTION (HCC): Status: ACTIVE | Noted: 2018-04-25

## 2018-04-25 RX ORDER — AZITHROMYCIN 250 MG/1
250 TABLET, FILM COATED ORAL DAILY
Qty: 30 TAB | Refills: 12 | Status: SHIPPED | OUTPATIENT
Start: 2018-04-25 | End: 2019-05-06 | Stop reason: SDUPTHER

## 2018-04-25 RX ORDER — RIFAMPIN 300 MG/1
600 CAPSULE ORAL DAILY
Qty: 30 CAP | Refills: 12 | Status: SHIPPED | OUTPATIENT
Start: 2018-04-25 | End: 2018-11-27 | Stop reason: SINTOL

## 2018-04-25 RX ORDER — ETHAMBUTOL HYDROCHLORIDE 400 MG/1
800 TABLET, FILM COATED ORAL DAILY
Qty: 30 TAB | Refills: 12 | Status: SHIPPED | OUTPATIENT
Start: 2018-04-25 | End: 2018-11-27 | Stop reason: SINTOL

## 2018-04-25 NOTE — TELEPHONE ENCOUNTER
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 Will check LFT at baseline and q 3 months on treatment  Eye exam on ethambutol  Will follow up imaging- Ct scan in 9-12 months. Discussed risks/benifits of treatment. Will follow up in clinic.     Jluis Mulligan

## 2018-04-27 ENCOUNTER — TELEPHONE (OUTPATIENT)
Dept: PULMONOLOGY | Age: 53
End: 2018-04-27

## 2018-04-27 LAB
ACID FAST STN SPEC: NEGATIVE
BACTERIA SPT CULT: NORMAL
GRAM STN SPT: NORMAL
Lab: ABNORMAL
Lab: ABNORMAL
M AVIUM CMPLX RRNA SPEC QL PROBE: NEGATIVE
M GORDONAE RRNA SPEC QL PROBE: NORMAL
M KANSASII RRNA SPEC QL PROBE: NORMAL
M TB CMPLX RRNA SPEC QL PROBE: NEGATIVE
MYCOBACTERIUM RRNA SPEC QL PROBE: NORMAL
MYCOBACTERIUM SPEC QL CULT: ABNORMAL
MYCOBACTERIUM SPEC: ABNORMAL
OTHER MICROORG DNA SPEC NAA+PROBE: ABNORMAL
SPECIMEN PREPARATION: ABNORMAL
SQUAMOUS SPT QL MICRO: NORMAL
WBC SPT QL MICRO: NORMAL

## 2018-04-27 NOTE — TELEPHONE ENCOUNTER
Pt had 3 antibiotics called in for her yesterday and two of them are to be taken twice daily but she was only given 30 pills so she only has a 15 day supply. She would like to have them changed so that when she gets her refills it will be for 30 days instead of 15. Pt uses [facility].  Please call [phone]

## 2018-05-21 ENCOUNTER — TELEPHONE (OUTPATIENT)
Dept: PULMONOLOGY | Age: 53
End: 2018-05-21

## 2018-05-21 NOTE — TELEPHONE ENCOUNTER
Pt states she has been taking 3 antibiotics since 4/28/18 and using inhaler. She stated coughing up blood yesterday, about 4 times. She was also very fatigued and went straight to bed after Yazidism. She would like Dr. Brittany Lauren to know this, b/c she was concerned. Please call [phone].

## 2018-05-21 NOTE — TELEPHONE ENCOUNTER
Advise her to take OTC- Delsym 1 tsp BID for cough. Should subside in 2-3 days. Watch for further hemoptysis- if continues please contact again- will get CTA to localize bleeding/may need bronch.

## 2018-05-21 NOTE — TELEPHONE ENCOUNTER
Pt states yesterday at about 2:30pm she had 10minute episode of coughing with blood. First x it was about a nickel size, then 3 x's smaller about dime size all within a 10 minute time span. Nothing since. Pt taking Zithromax, Ethambutol, Rifadin since 4/28/18.

## 2018-05-29 NOTE — TELEPHONE ENCOUNTER
Pt called today because she went to the doctor last week and he put her on another antibiotic for a uti. She said that she is now taking 7 antibiotics a day some of which are from Dr. Raji Guerra for an infection. She would like to speak with someone about them because she is having nausea and loss of appetite.  Please call pt [phone]

## 2018-05-29 NOTE — TELEPHONE ENCOUNTER
Pt was tx with Macrobid for URI by Tyler Quintero NP who works in [place]. It was rx BID x 1 week and then 1x per day for 45 days. She is already taking the 3 antibiotics from Dr. Brittany Lauren and the [facility] was ok'ed by pharmacy to PCP's office to add. Since adding the [facility] has cause N/V and loss of appetite. Pt to call her PCP Dr. Mateo Lovelace to discuss the length of Macrobid since the bad side effects she is having with the [facility].

## 2018-05-30 ENCOUNTER — DOCUMENTATION ONLY (OUTPATIENT)
Dept: PULMONOLOGY | Age: 53
End: 2018-05-30

## 2018-05-30 DIAGNOSIS — T50.905A DRUG-INDUCED HEPATIC TOXICITY: Primary | ICD-10-CM

## 2018-05-30 DIAGNOSIS — K71.6 DRUG-INDUCED HEPATIC TOXICITY: Primary | ICD-10-CM

## 2018-05-30 NOTE — PROGRESS NOTES
Received labs from PCP- ([facility])  LFT's elevated AST, ALT -almost 5 times base. Advising to stop rifampin and ethambutol  Continue zithromax  Will recheck LFT in 2 weeks and decide on further therapy.

## 2018-05-30 NOTE — LETTER
5/30/2018 1:26 PM 
 
Ms. Jose Alberto [facility] [address] Received labs from drawn at PCP- ([facility]) LFT's elevated AST, ALT -almost 5 times base. Advising to stop rifampin and ethambutol Continue zithromax Will recheck LFT in 2 weeks and decide on further therapy.  
 
 
Ranjit Jett MD

## 2018-06-15 ENCOUNTER — OFFICE VISIT (OUTPATIENT)
Dept: PULMONOLOGY | Age: 53
End: 2018-06-15

## 2018-06-15 VITALS
DIASTOLIC BLOOD PRESSURE: 60 MMHG | HEIGHT: 67 IN | TEMPERATURE: 97.9 F | WEIGHT: 127 LBS | HEART RATE: 75 BPM | BODY MASS INDEX: 19.93 KG/M2 | RESPIRATION RATE: 16 BRPM | OXYGEN SATURATION: 99 % | SYSTOLIC BLOOD PRESSURE: 98 MMHG

## 2018-06-15 DIAGNOSIS — J47.9 BRONCHIECTASIS WITHOUT COMPLICATION (HCC): Primary | ICD-10-CM

## 2018-06-15 DIAGNOSIS — A31.0 MYCOBACTERIUM KANSASII INFECTION (HCC): ICD-10-CM

## 2018-06-15 DIAGNOSIS — J45.20 MILD INTERMITTENT ASTHMA WITHOUT COMPLICATION: ICD-10-CM

## 2018-06-15 RX ORDER — FLUTICASONE FUROATE AND VILANTEROL 100; 25 UG/1; UG/1
1 POWDER RESPIRATORY (INHALATION) DAILY
Qty: 1 INHALER | Refills: 6 | Status: SHIPPED | OUTPATIENT
Start: 2018-06-15 | End: 2018-11-27 | Stop reason: SDUPTHER

## 2018-06-15 NOTE — MR AVS SNAPSHOT
[address] [facility], [address] [address] 
[phone] Patient: Arcenio Coker MRN: [id number] :1965 Visit Information Date & Time Provider Department Dept. Phone Encounter #  
 6/15/2018  8:30 AM Denny Isabel MD [facility] [id number] Follow-up Instructions Return in about 3 months (around 9/15/2018). Upcoming Health Maintenance Date Due Hepatitis C Screening 1965 DTaP/Tdap/Td series (1 - Tdap) 1985 PAP AKA CERVICAL CYTOLOGY 2014 FOBT Q 1 YEAR AGE 50-75 2014 Influenza Age 5 to Adult 2018 BREAST CANCER SCRN MAMMOGRAM 3/31/2020 Allergies as of 6/15/2018  Review Complete On: 6/15/2018 By: Denny Isabel MD  
  
 Severity Noted Reaction Type Reactions Percocet [Oxycodone-acetaminophen] Low 2017   Systemic Nausea Only Current Immunizations  Reviewed on 2017 Name Date Influenza Vaccine (Quad) 2015 12:00 AM  
 Pneumococcal Polysaccharide (PPSV-23) 2017 10:05 AM  
  
 Not reviewed this visit You Were Diagnosed With   
  
 Codes Comments Bronchiectasis without complication ([facility]    -  Primary ICD-10-CM: J47.9 ICD-9-CM: 494.0 Mild intermittent asthma without complication     LBW-62-QH: J45.20 ICD-9-CM: 493.90 Mycobacterium kansasii infection ([facility].)     ICD-10-CM: A31.0 ICD-9-CM: 031.0 Vitals BP Pulse Temp Resp Height(growth percentile) Weight(growth percentile) 98/60 (BP 1 Location: Left arm, BP Patient Position: Sitting) 75 97.9 °F (36.6 °C) (Oral) 16 5' 7\" (1.702 m) 127 lb (57.6 kg) LMP SpO2 BMI OB Status Smoking Status 10/20/2011 99% 19.89 kg/m2 Hysterectomy Never Smoker Vitals History BMI and BSA Data Body Mass Index Body Surface Area  
 19.89 kg/m 2 1.65 m 2 Preferred Pharmacy Pharmacy Name Phone [address] [facility], [address] [place] [phone] Your Updated Medication List  
  
   
This list is accurate as of 6/15/18  9:06 AM.  Always use your most recent med list.  
  
  
  
  
 ethambutol 400 mg tablet Commonly known as:  MYAMBUTOL Take 2 Tabs by mouth daily. FLONASE ALLERGY RELIEF 50 mcg/actuation nasal spray Generic drug:  fluticasone 2 Sprays by Both Nostrils route daily. fluticasone-vilanterol 100-25 mcg/dose inhaler Commonly known as:  BREO ELLIPTA Take 1 Puff by inhalation daily. MULTIVITAMIN PO Take  by mouth. rifAMPin 300 mg capsule Commonly known as:  RIFADIN Take 2 Caps by mouth daily. VENTOLIN HFA 90 mcg/actuation inhaler Generic drug:  albuterol Take 2 Puffs by inhalation every four (4) hours as needed. VITAMIN D3 1,000 unit tablet Generic drug:  cholecalciferol Take  by mouth daily. Prescriptions Sent to Pharmacy Refills  
 fluticasone-vilanterol (BREO ELLIPTA) 100-25 mcg/dose inhaler 6 Sig: Take 1 Puff by inhalation daily. Class: Normal  
 Pharmacy: [facility] [address] SAV [facility]IA [address] [place] [address], [address] [place] [address] #: [phone] Route: Inhalation Follow-up Instructions Return in about 3 months (around 9/15/2018). Patient Instructions Hydration- drink fluids Hot showers Ventolin 2 times /day Continue Breo Continue Zithromax Get labs done. Introducing [facility]! Dear Sukhdev Urbano: Thank you for requesting a [employer] account. Our records indicate that you already have an active [employer] account. You can access your account anytime at [email or web address] Did you know that you can access your hospital and ER discharge instructions at any time in [employer]Span? You can also review all of your test results from your hospital stay or ER visit. Additional Information If you have questions, please visit the Frequently Asked Questions section of the [employer] website at [email or web address] Remember, [employer] is NOT to be used for urgent needs. For medical emergencies, dial 911. Now available from your iPhone and Android! Please provide this summary of care documentation to your next provider. Your primary care clinician is listed as John Brewster. If you have any questions after today's visit, please call [phone].

## 2018-06-15 NOTE — PROGRESS NOTES
Chief Complaint   Patient presents with    Cough     hemoptysis       1. Have you been to the ER, urgent care clinic since your last visit? Hospitalized since your last visit? No    2. Have you seen or consulted any other health care providers outside of the [facility] since your last visit? Include any pap smears or colon screening.  Yes Where: Dr Hany Saleem

## 2018-06-15 NOTE — COMMUNICATION BODY
[facility]  Pulmonary, Critical Care, and Sleep Medicine      Pulmonary Office visit. Name: Zuleima Neely     : 1965     Date: 6/15/2018        Subjective:     Patient is a 47 y.o. female initially referred for Hemoptysis . Now followed for bronchiectasis. comes for follow up with c/o recent recurring hemoptysis. Positive sputum culture for AFB- MCorey Sheffield    06/15/18    Was started on Rifampin and Ethambutol for Corinda Keaton- LFT's noted to be abnormal >5 fold so instructed to stop meds. She reports feeling sick with malaise, aches, chills and anorexia while on meds. Feels much better now after stopping the meds. She has some cough- thick mucus- difficult to expectorate. Minimal hemoptysis. Continues to take zithromax- no problems  Using breo. Denies SOB or wheezing- uses rescue inhaler infrequently  Has had several episodes of hemoptysis in Feb and now again in March- antonio red blood. Occasionally mixed with mucus. Had HVAC changed due to \"spores\"  No humidifier- air is dry    HPI:  Patient reports being in good health and about 2 months back had an episode of spitting up blood X4 in an overnight period prompting visit to MD. She was prescribed antibiotics and improved. Further investigations with CXR and CT scan  Obtained with abnormalities on CT- Pulmonary consulted. She had since not had any further hemoptysis until 3 days back when she coughed up blood tinged mucus. She reports having a need to cough and clear her throat -usually in am. Denies any chronic cough. Denies any wheezing. Never diagnosed with asthma. She has seasonal increased nasal congestion, drainage  And postnasal drip- takes OTC Zrytec. Denies any fever, chills, weight loss. C/o chest pain- after eating certain foods. She has never smoked. No work in any industrial setting. She grew up in [address] [place] [address] 1 and moved to [place] as an adult.  No travel to [place] [address].  She has a dog at home.  No h/o childhood respiratory illness- whooping cough. No known TB exposure. Allergies   Allergen Reactions    Percocet [Oxycodone-Acetaminophen] Nausea Only     Current Outpatient Prescriptions   Medication Sig Dispense Refill    fluticasone (FLONASE ALLERGY RELIEF) 50 mcg/actuation nasal spray 2 Sprays by Both Nostrils route daily.  fluticasone-vilanterol (BREO ELLIPTA) 100-25 mcg/dose inhaler Take 1 Puff by inhalation daily. 1 Inhaler 6    VENTOLIN HFA 90 mcg/actuation inhaler Take 2 Puffs by inhalation every four (4) hours as needed. 1 Inhaler 6    MULTIVITAMIN PO Take  by mouth.  cholecalciferol, vitamin d3, (VITAMIN D) 1,000 unit tablet Take  by mouth daily.  ethambutol (MYAMBUTOL) 400 mg tablet Take 2 Tabs by mouth daily. 30 Tab 12    rifAMPin (RIFADIN) 300 mg capsule Take 2 Caps by mouth daily.  30 Cap 12     Review of Systems:  HEENT: No epistaxis, no nasal drainage, no difficulty in swallowing, no redness in eyes  Respiratory: as above  Cardiovascular: no chest pain, no palpitations, no chronic leg edema, no syncope  Gastrointestinal: no abd pain, no vomiting, no diarrhea, no bleeding symptoms  Genitourinary: No urinary symptoms or hematuria  Integument/breast: No ulcers or rashes  Musculoskeletal:Neg  Neurological: No focal weakness, no seizures, no headaches  Behvioral/Psych: No anxiety, no depression  Constitutional: No fever, no chills, no weight loss, no night sweats     Objective:     Visit Vitals    BP 98/60 (BP 1 Location: Left arm, BP Patient Position: Sitting)    Pulse 75    Temp 97.9 °F (36.6 °C) (Oral)    Resp 16    Ht 5' 7\" (1.702 m)    Wt 57.6 kg (127 lb)    LMP 10/20/2011    SpO2 99%    BMI 19.89 kg/m2        Physical Exam:   General: comfortable, no acute distress  HEENT: pupils reactive, sclera anicteric, EOM intact  Neck: No adenopathy or thyroid swelling, no lymphadenopathy or JVD, supple  CVS: S1S2 no murmurs  RS: Mod AE bilaterally , no wheezing no tactile fremitus or egophony, no accessory muscle use  Abd: soft, non tender, no hepatosplenomegaly  Neuro: non focal, awake, alert  Extrm: no leg edema, clubbing or cyanosis  Skin: no rash    Data review:    IgE- WNL  Allergy profile-    D. pteronyssinus <0.10  Class 0 kU/L Final     D. farinae Mite <0.10  Class 0 kU/L Final     Cat Hair/Dander <0.10  Class 0 kU/L Final     Dog Hair/Dander <0.10  Class 0 kU/L Final     Bermuda Grass 4.18 (A) Class IV kU/L Final     Chapin grass 4.19 (A) Class IV kU/L Final     Muair Grass 2.79 (A) Class III kU/L Final     Bahia Grass 6.48 (A) Class IV kU/L Final     [id number] COCKROACH, AMERICAN <0.10  Class 0 kU/L Final     Penicillium notatum <0.10  Class 0 kU/L Final     Cladosporium herbarum <0.10  Class 0 kU/L Final     Aspergillus fumigatus <0.10  Class 0 kU/L Final     Mucor racemosus <0.10  Class 0 kU/L Final     Alternaria tenuis <0.10  Class 0 kU/L Final     Stemphylium botryosum <0.10  Class 0 kU/L Final     [id number] COMMON SILVER BIRCH 0.37 (A) Class I kU/L Final     [place] 1.67 (A) Class III kU/L Final     American White Elm 0.30 (A) Class 0/I kU/L Final     Maple/Box Elder 0.38 (A) Class I kU/L Final     White Hickory 0.70 (A) Class II kU/L Final     [id number] MAPLE LEAF [place] 0.20 (A) Class 0/I kU/L Final     White [place] <0.10  Class 0 kU/L Final     Sweet Gum 0.25 (A) Class 0/I kU/L Final     Mountain [place] 0.23 (A) Class 0/I kU/L Final     Ragweed, Short/Common 0.34 (A) Class I kU/L Final     Mugwort 0.19 (A) Class 0/I kU/L Final     Plantain, English 0.23 (A) Class 0/I kU/L Final     Pigweed, Rough 0.26 (A) Class 0/I kU/L Final     Sheep Sorrel (Dock) 0.31 (A) Class 0/I kU/L Final     Nettle 0.14 (A) Class 0/I kU/L Final       Narrative      4/27/2018 11:37 AM - [place], [facility] Lab Results In       Component Results      Component     AFB SPECIMEN PROCESSING     Concentration     ACID FAST SMEAR     Negative     ACID FAST CULTURE (Abnormal)     Positive Acid-fast bacilli have been detected in culture at 2 weeks; see AFB        Comment:       Imaging:  I have personally reviewed the patients radiographs and have reviewed the reports and images:  CXR Results  (Last 48 hours)    None        CT scan of chest ( [facility]) reviewed images- biapical pleural thickening, apical parenchymal scarring and RML bronchiectatic changes with scattered infiltrates, RUL bronchiectatic changes     IMPRESSION:   · Bronchiectasis- recent recurring hemoptysis- ? Superinfection with atypical mycobacteria. Cultures isolated Will Manley so initiated treatment but patient developed significant systemic symptoms of fatigue, malaise, anorexia, flu like aches and fevers/chills with > 5 fold increase in LFT's - likely all rifampin related so instructed to stop and has since recovered. · Elevated transaminates- drug induced hepatotoxicity  ·  Lower respiratory tract infection:Predominant location in RML with some RUL distribution and biapical pleuroparenchymal infiltrates/fibrosis suggests a sequelae of remote inflammatory/granulomatous process. · CT scan (2016) with some scattered areas of infiltrates-Secondary to above  · Hemoptysis secondary to above  · Reactive airways disease- allergic vs secondary to above. 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 Repeat 10/2017- normal range. RECOMMENDATIONS:     ·   · Will hold off on antimycobacterial therapy  · Discussed adverse event vs treatment benefit.  Significant systemic and hepatic toxicity with rifampin and ethambutol  · Recheck LFT's  · Will check HRCT and sputum for cultures and AFB periodically as indicted  · May need bronchoscopy to evaluate further to identify location of bleeding and consider bronchial artery embolization vs other surgical options if hemoptysis significantly recurs. · Continue  Breo-ellipta , prn albuterol  · Instructed to humidify ambient atmosphere- cool mist humidifier  · Discussed strict allergen avoidance- grasses, trees. · Encouraged use of mask if outdoors in spring/fall  · Strict GERD precautions and add PPI  · Discussed at length diagnosis and treatment plan- written instructions given. · Will consider allergen therapy if unable to control with current  interventions  · Discussed new lab results- specifically IgG subclass ( now normal)  · Discussed vigilance and checking out for infectious /allergic triggers in work space  · Preventive vaccinations- Pneumovax PPSV23 up to date  · Will get Influenza vaccine- recieved  · Will follow up        Health maintenance screens deferred to Primary care provider.      Denny Isabel MD

## 2018-06-15 NOTE — LETTER
6/15/2018 1:12 PM 
 
Patient:  Deepak Mosqueda YOB: 1965 Date of Visit: 6/15/2018 Dear Cathie Skaggs MD 
[address] [address] [place] [address] VIA Facsimile: [phone] 
 : Thank you for referring Ms. Zbigniew Will to me for evaluation/treatment. Below are the relevant portions of my assessment and plan of care. [facility] Pulmonary, Critical Care, and Sleep Medicine Pulmonary Office visit. Name: Deepak Mosqueda : 1965 Date: 6/15/2018 Subjective:  
 
Patient is a 47 y.o. female initially referred for Hemoptysis . Now followed for bronchiectasis. comes for follow up with c/o recent recurring hemoptysis. Positive sputum culture for AFB- MCorey Black 
 
06/15/18 Was started on Rifampin and Ethambutol for Sindy Luquillo- LFT's noted to be abnormal >5 fold so instructed to stop meds. She reports feeling sick with malaise, aches, chills and anorexia while on meds. Feels much better now after stopping the meds. She has some cough- thick mucus- difficult to expectorate. Minimal hemoptysis. Continues to take zithromax- no problems Using breo. Denies SOB or wheezing- uses rescue inhaler infrequently Has had several episodes of hemoptysis in Feb and now again in March- antonio red blood. Occasionally mixed with mucus. Had HVAC changed due to \"spores\" No humidifier- air is dry HPI: 
Patient reports being in good health and about 2 months back had an episode of spitting up blood X4 in an overnight period prompting visit to MD. She was prescribed antibiotics and improved. Further investigations with CXR and CT scan  Obtained with abnormalities on CT- Pulmonary consulted. She had since not had any further hemoptysis until 3 days back when she coughed up blood tinged mucus. She reports having a need to cough and clear her throat -usually in am. Denies any chronic cough.  Denies any wheezing. Never diagnosed with asthma. She has seasonal increased nasal congestion, drainage  And postnasal drip- takes OTC Zrytec. Denies any fever, chills, weight loss. C/o chest pain- after eating certain foods. She has never smoked. No work in any industrial setting. She grew up in [address] [place] [address] and moved to [place] as an adult. No travel to [place] [address]. 
She has a dog at home. No h/o childhood respiratory illness- whooping cough. No known TB exposure. Allergies Allergen Reactions  Percocet [Oxycodone-Acetaminophen] Nausea Only Current Outpatient Prescriptions Medication Sig Dispense Refill  fluticasone (FLONASE ALLERGY RELIEF) 50 mcg/actuation nasal spray 2 Sprays by Both Nostrils route daily.  fluticasone-vilanterol (BREO ELLIPTA) 100-25 mcg/dose inhaler Take 1 Puff by inhalation daily. 1 Inhaler 6  
 VENTOLIN HFA 90 mcg/actuation inhaler Take 2 Puffs by inhalation every four (4) hours as needed. 1 Inhaler 6  MULTIVITAMIN PO Take  by mouth.  cholecalciferol, vitamin d3, (VITAMIN D) 1,000 unit tablet Take  by mouth daily.  ethambutol (MYAMBUTOL) 400 mg tablet Take 2 Tabs by mouth daily. 30 Tab 12  
 rifAMPin (RIFADIN) 300 mg capsule Take 2 Caps by mouth daily. 30 Cap 12 Review of Systems: 
HEENT: No epistaxis, no nasal drainage, no difficulty in swallowing, no redness in eyes Respiratory: as above Cardiovascular: no chest pain, no palpitations, no chronic leg edema, no syncope Gastrointestinal: no abd pain, no vomiting, no diarrhea, no bleeding symptoms Genitourinary: No urinary symptoms or hematuria Integument/breast: No ulcers or rashes Musculoskeletal:Neg 
Neurological: No focal weakness, no seizures, no headaches Behvioral/Psych: No anxiety, no depression Constitutional: No fever, no chills, no weight loss, no night sweats Objective:  
 
Visit Vitals  BP 98/60 (BP 1 Location: Left arm, BP Patient Position: Sitting)  Pulse 75  Temp 97.9 °F (36.6 °C) (Oral)  Resp 16  
 Ht 5' 7\" (1.702 m)  Wt 57.6 kg (127 lb)  LMP 10/20/2011  SpO2 99%  BMI 19.89 kg/m2 Physical Exam:  
General: comfortable, no acute distress HEENT: pupils reactive, sclera anicteric, EOM intact Neck: No adenopathy or thyroid swelling, no lymphadenopathy or JVD, supple CVS: S1S2 no murmurs RS: Mod AE bilaterally , no wheezing no tactile fremitus or egophony, no accessory muscle use Abd: soft, non tender, no hepatosplenomegaly Neuro: non focal, awake, alert Extrm: no leg edema, clubbing or cyanosis Skin: no rash Data review: IgE- WNL Allergy profile- 
  D. pteronyssinus <0.10  Class 0 kU/L Final  
  D. farinae Mite <0.10  Class 0 kU/L Final  
  Cat Hair/Dander <0.10  Class 0 kU/L Final  
  Dog Hair/Dander <0.10  Class 0 kU/L Final  
  Bermuda Grass 4.18 (A) Class IV kU/L Final  
  Chapin grass 4.19 (A) Class IV kU/L Final  
  Umair Grass 2.79 (A) Class III kU/L Final  
  Bahia Grass 6.48 (A) Class IV kU/L Final  
  [id number] COCKROACH, AMERICAN <0.10  Class 0 kU/L Final  
  Penicillium notatum <0.10  Class 0 kU/L Final  
  Cladosporium herbarum <0.10  Class 0 kU/L Final  
  Aspergillus fumigatus <0.10  Class 0 kU/L Final  
  Mucor racemosus <0.10  Class 0 kU/L Final  
  Alternaria tenuis <0.10  Class 0 kU/L Final  
  Stemphylium botryosum <0.10  Class 0 kU/L Final  
  [id number] COMMON SILVER BIRCH 0.37 (A) Class I kU/L Final  
  Venus 1.67 (A) Class III kU/L Final  
  American White Elm 0.30 (A) Class 0/I kU/L Final  
  Maple/Box Elder 0.38 (A) Class I kU/L Final  
  White Hickory 0.70 (A) Class II kU/L Final  
  [id number] MAPLE LEAF SYCAMORE 0.20 (A) Class 0/I kU/L Final  
  White [place] <0.10  Class 0 kU/L Final  
  Sweet Gum 0.25 (A) Class 0/I kU/L Final  
  Mountain [place] 0.23 (A) Class 0/I kU/L Final  
  Ragweed, Short/Common 0.34 (A) Class I kU/L Final  
  Mugwort 0.19 (A) Class 0/I kU/L Final  
   Plantain, English 0.23 (A) Class 0/I kU/L Final  
  Pigweed, Rough 0.26 (A) Class 0/I kU/L Final  
  Sheep Sorrel (Dock) 0.31 (A) Class 0/I kU/L Final  
  Nettle 0.14 (A) Class 0/I kU/L Final  
   
Narrative 4/27/2018 11:37 AM - Tyrel, [facility] Lab Results In  
   
Component Results   
  Component  
  AFB SPECIMEN PROCESSING  
  Concentration  
  ACID FAST SMEAR  
  Negative  
  ACID FAST CULTURE (Abnormal)  
  Positive Acid-fast bacilli have been detected in culture at 2 weeks; see AFB  
  
  Comment:  
 
 
Imaging: 
I have personally reviewed the patients radiographs and have reviewed the reports and images: CXR Results  (Last 48 hours) None CT scan of chest ( [facility]) reviewed images- biapical pleural thickening, apical parenchymal scarring and RML bronchiectatic changes with scattered infiltrates, RUL bronchiectatic changes IMPRESSION:  
· Bronchiectasis- recent recurring hemoptysis- ? Superinfection with atypical mycobacteria. Cultures isolated Edgar Escalante so initiated treatment but patient developed significant systemic symptoms of fatigue, malaise, anorexia, flu like aches and fevers/chills with > 5 fold increase in LFT's - likely all rifampin related so instructed to stop and has since recovered. · Elevated transaminates- drug induced hepatotoxicity ·  Lower respiratory tract infection:Predominant location in RML with some RUL distribution and biapical pleuroparenchymal infiltrates/fibrosis suggests a sequelae of remote inflammatory/granulomatous process. · CT scan (2016) with some scattered areas of infiltrates-Secondary to above · Hemoptysis secondary to above · Reactive airways disease- allergic vs secondary to above. 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 Repeat 10/2017- normal range. RECOMMENDATIONS:  
 
·  
· Will hold off on antimycobacterial therapy · Discussed adverse event vs treatment benefit. Significant systemic and hepatic toxicity with rifampin and ethambutol · Recheck LFT's · Will check HRCT and sputum for cultures and AFB periodically as indicted · May need bronchoscopy to evaluate further to identify location of bleeding and consider bronchial artery embolization vs other surgical options if hemoptysis significantly recurs. · Continue  Breo-ellipta , prn albuterol · Instructed to humidify ambient atmosphere- cool mist humidifier · Discussed strict allergen avoidance- grasses, trees. · Encouraged use of mask if outdoors in spring/fall · Strict GERD precautions and add PPI · Discussed at length diagnosis and treatment plan- written instructions given. · Will consider allergen therapy if unable to control with current  interventions · Discussed new lab results- specifically IgG subclass ( now normal) · Discussed vigilance and checking out for infectious /allergic triggers in work space · Preventive vaccinations- Pneumovax PPSV23 up to date · Will get Influenza vaccine- recieved · Will follow up Health maintenance screens deferred to Primary care provider. Apple Garrison MD 
 
 
 
 
 
 
If you have questions, please do not hesitate to call me. I look forward to following Ms. Elliott along with you.  
 
 
 
Sincerely, 
 
 
Apple Garrison MD

## 2018-06-15 NOTE — PROGRESS NOTES
[facility]  Pulmonary, Critical Care, and Sleep Medicine      Pulmonary Office visit. Name: Michael Amador     : 1965     Date: 6/15/2018        Subjective:     Patient is a 47 y.o. female initially referred for Hemoptysis . Now followed for bronchiectasis. comes for follow up with c/o recent recurring hemoptysis. Positive sputum culture for AFB- MCorey Odonnell    06/15/18    Was started on Rifampin and Ethambutol for Sherrine Fraise- LFT's noted to be abnormal >5 fold so instructed to stop meds. She reports feeling sick with malaise, aches, chills and anorexia while on meds. Feels much better now after stopping the meds. She has some cough- thick mucus- difficult to expectorate. Minimal hemoptysis. Continues to take zithromax- no problems  Using breo. Denies SOB or wheezing- uses rescue inhaler infrequently  Has had several episodes of hemoptysis in Feb and now again in March- antonio red blood. Occasionally mixed with mucus. Had HVAC changed due to \"spores\"  No humidifier- air is dry    HPI:  Patient reports being in good health and about 2 months back had an episode of spitting up blood X4 in an overnight period prompting visit to MD. She was prescribed antibiotics and improved. Further investigations with CXR and CT scan  Obtained with abnormalities on CT- Pulmonary consulted. She had since not had any further hemoptysis until 3 days back when she coughed up blood tinged mucus. She reports having a need to cough and clear her throat -usually in am. Denies any chronic cough. Denies any wheezing. Never diagnosed with asthma. She has seasonal increased nasal congestion, drainage  And postnasal drip- takes OTC Zrytec. Denies any fever, chills, weight loss. C/o chest pain- after eating certain foods. She has never smoked. No work in any industrial setting. She grew up in [address] [place] [address] 1 and moved to [place] as an adult.  No travel to [place] [address].  She has a dog at home.  No h/o childhood respiratory illness- whooping cough. No known TB exposure. Allergies   Allergen Reactions    Percocet [Oxycodone-Acetaminophen] Nausea Only     Current Outpatient Prescriptions   Medication Sig Dispense Refill    fluticasone (FLONASE ALLERGY RELIEF) 50 mcg/actuation nasal spray 2 Sprays by Both Nostrils route daily.  fluticasone-vilanterol (BREO ELLIPTA) 100-25 mcg/dose inhaler Take 1 Puff by inhalation daily. 1 Inhaler 6    VENTOLIN HFA 90 mcg/actuation inhaler Take 2 Puffs by inhalation every four (4) hours as needed. 1 Inhaler 6    MULTIVITAMIN PO Take  by mouth.  cholecalciferol, vitamin d3, (VITAMIN D) 1,000 unit tablet Take  by mouth daily.  ethambutol (MYAMBUTOL) 400 mg tablet Take 2 Tabs by mouth daily. 30 Tab 12    rifAMPin (RIFADIN) 300 mg capsule Take 2 Caps by mouth daily.  30 Cap 12     Review of Systems:  HEENT: No epistaxis, no nasal drainage, no difficulty in swallowing, no redness in eyes  Respiratory: as above  Cardiovascular: no chest pain, no palpitations, no chronic leg edema, no syncope  Gastrointestinal: no abd pain, no vomiting, no diarrhea, no bleeding symptoms  Genitourinary: No urinary symptoms or hematuria  Integument/breast: No ulcers or rashes  Musculoskeletal:Neg  Neurological: No focal weakness, no seizures, no headaches  Behvioral/Psych: No anxiety, no depression  Constitutional: No fever, no chills, no weight loss, no night sweats     Objective:     Visit Vitals    BP 98/60 (BP 1 Location: Left arm, BP Patient Position: Sitting)    Pulse 75    Temp 97.9 °F (36.6 °C) (Oral)    Resp 16    Ht 5' 7\" (1.702 m)    Wt 57.6 kg (127 lb)    LMP 10/20/2011    SpO2 99%    BMI 19.89 kg/m2        Physical Exam:   General: comfortable, no acute distress  HEENT: pupils reactive, sclera anicteric, EOM intact  Neck: No adenopathy or thyroid swelling, no lymphadenopathy or JVD, supple  CVS: S1S2 no murmurs  RS: Mod AE bilaterally , no wheezing no tactile fremitus or egophony, no accessory muscle use  Abd: soft, non tender, no hepatosplenomegaly  Neuro: non focal, awake, alert  Extrm: no leg edema, clubbing or cyanosis  Skin: no rash    Data review:    IgE- WNL  Allergy profile-    D. pteronyssinus <0.10  Class 0 kU/L Final     D. farinae Mite <0.10  Class 0 kU/L Final     Cat Hair/Dander <0.10  Class 0 kU/L Final     Dog Hair/Dander <0.10  Class 0 kU/L Final     Bermuda Grass 4.18 (A) Class IV kU/L Final     Chapin grass 4.19 (A) Class IV kU/L Final     Umair Grass 2.79 (A) Class III kU/L Final     Bahia Grass 6.48 (A) Class IV kU/L Final     I265-[id number] COCKROACH, AMERICAN <0.10  Class 0 kU/L Final     Penicillium notatum <0.10  Class 0 kU/L Final     Cladosporium herbarum <0.10  Class 0 kU/L Final     Aspergillus fumigatus <0.10  Class 0 kU/L Final     Mucor racemosus <0.10  Class 0 kU/L Final     Alternaria tenuis <0.10  Class 0 kU/L Final     Stemphylium botryosum <0.10  Class 0 kU/L Final     [id number] COMMON SILVER BIRCH 0.37 (A) Class I kU/L Final     [place]odeo 1.67 (A) Class III kU/L Final     American White Elm 0.30 (A) Class 0/I kU/L Final     Maple/Box Elder 0.38 (A) Class I kU/L Final     White Hickory 0.70 (A) Class II kU/L Final     [id number] MAPLE LEAF SYCAMORE 0.20 (A) Class 0/I kU/L Final     White [place] <0.10  Class 0 kU/L Final     Sweet Gum 0.25 (A) Class 0/I kU/L Final     Mountain [place] 0.23 (A) Class 0/I kU/L Final     Ragweed, Short/Common 0.34 (A) Class I kU/L Final     Mugwort 0.19 (A) Class 0/I kU/L Final     Plantain, English 0.23 (A) Class 0/I kU/L Final     Pigweed, Rough 0.26 (A) Class 0/I kU/L Final     Sheep Sorrel (Dock) 0.31 (A) Class 0/I kU/L Final     Nettle 0.14 (A) Class 0/I kU/L Final       Narrative      4/27/2018 11:37 AM - [facility], [facility] Lab Results In       Component Results      Component     AFB SPECIMEN PROCESSING     Concentration     ACID FAST SMEAR     Negative     ACID FAST CULTURE (Abnormal)     Positive Acid-fast bacilli have been detected in culture at 2 weeks; see AFB        Comment:       Imaging:  I have personally reviewed the patients radiographs and have reviewed the reports and images:  CXR Results  (Last 48 hours)    None        CT scan of chest ( [facility]) reviewed images- biapical pleural thickening, apical parenchymal scarring and RML bronchiectatic changes with scattered infiltrates, RUL bronchiectatic changes     IMPRESSION:   · Bronchiectasis- recent recurring hemoptysis- ? Superinfection with atypical mycobacteria. Cultures isolated Du Ouch so initiated treatment but patient developed significant systemic symptoms of fatigue, malaise, anorexia, flu like aches and fevers/chills with > 5 fold increase in LFT's - likely all rifampin related so instructed to stop and has since recovered. ·  Lower respiratory tract infection:Predominant location in RML with some RUL distribution and biapical pleuroparenchymal infiltrates/fibrosis suggests a sequelae of remote inflammatory/granulomatous process. · CT scan (2016) with some scattered areas of infiltrates-Secondary to above  · Hemoptysis secondary to above  · Reactive airways disease- allergic vs secondary to above. 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 Repeat 10/2017- normal range. RECOMMENDATIONS:     ·   · Will hold off on antimycobacterial therapy  · Discussed adverse event vs treatment benefit.  Significant systemic and hepatic toxicity with rifampin and ethambutol  · Recheck LFT's  · Will check HRCT and sputum for cultures and AFB periodically as indicted  · May need bronchoscopy to evaluate further to identify location of bleeding and consider bronchial artery embolization vs other surgical options if hemoptysis significantly recurs. · Continue  Breo-ellipta , prn albuterol  · Instructed to humidify ambient atmosphere- cool mist humidifier  · Discussed strict allergen avoidance- grasses, trees. · Encouraged use of mask if outdoors in spring/fall  · Strict GERD precautions and add PPI  · Discussed at length diagnosis and treatment plan- written instructions given. · Will consider allergen therapy if unable to control with current  interventions  · Discussed new lab results- specifically IgG subclass ( now normal)  · Discussed vigilance and checking out for infectious /allergic triggers in work space  · Preventive vaccinations- Pneumovax PPSV23 up to date  · Will get Influenza vaccine- recieved  · Will follow up        Health maintenance screens deferred to Primary care provider.      Nisha Multani MD

## 2018-06-15 NOTE — PATIENT INSTRUCTIONS
Hydration- drink fluids  Hot showers  Ventolin 2 times /day  Continue Breo  Continue Zithromax  Get labs done.

## 2018-06-23 ENCOUNTER — HOSPITAL ENCOUNTER (OUTPATIENT)
Dept: LAB | Age: 53
Discharge: HOME | End: 2018-06-23

## 2018-06-23 PROCEDURE — 99001 SPECIMEN HANDLING PT-LAB: CPT | Performed by: INTERNAL MEDICINE

## 2018-06-24 LAB
ALBUMIN SERPL-MCNC: 4.7 G/DL (ref 3.5–5.5)
ALP SERPL-CCNC: 72 IU/L (ref 39–117)
ALT SERPL-CCNC: 17 IU/L (ref 0–32)
AST SERPL-CCNC: 19 IU/L (ref 0–40)
BILIRUB DIRECT SERPL-MCNC: 0.17 MG/DL (ref 0–0.4)
BILIRUB SERPL-MCNC: 0.6 MG/DL (ref 0–1.2)
PROT SERPL-MCNC: 7.1 G/DL (ref 6–8.5)

## 2018-06-28 ENCOUNTER — TELEPHONE (OUTPATIENT)
Dept: PULMONOLOGY | Age: 53
End: 2018-06-28

## 2018-06-28 NOTE — TELEPHONE ENCOUNTER
PT [phone]). NEEDS TO TALK TO NURSE REGARDING RESULTS OF LAB DONE6/23/18 AT [facility]. PLEASE CALL BACK.

## 2018-07-27 DIAGNOSIS — Z51.81 THERAPEUTIC DRUG MONITORING: ICD-10-CM

## 2018-11-27 ENCOUNTER — OFFICE VISIT (OUTPATIENT)
Dept: PULMONOLOGY | Age: 53
End: 2018-11-27

## 2018-11-27 VITALS
TEMPERATURE: 98.2 F | OXYGEN SATURATION: 99 % | WEIGHT: 130.2 LBS | BODY MASS INDEX: 20.44 KG/M2 | HEIGHT: 67 IN | DIASTOLIC BLOOD PRESSURE: 76 MMHG | RESPIRATION RATE: 16 BRPM | HEART RATE: 68 BPM | SYSTOLIC BLOOD PRESSURE: 108 MMHG

## 2018-11-27 DIAGNOSIS — A31.0 MYCOBACTERIUM KANSASII INFECTION (HCC): ICD-10-CM

## 2018-11-27 DIAGNOSIS — J47.9 BRONCHIECTASIS WITHOUT COMPLICATION (HCC): Primary | ICD-10-CM

## 2018-11-27 DIAGNOSIS — J45.20 MILD INTERMITTENT ASTHMA WITHOUT COMPLICATION: ICD-10-CM

## 2018-11-27 DIAGNOSIS — J30.1 NON-SEASONAL ALLERGIC RHINITIS DUE TO POLLEN: ICD-10-CM

## 2018-11-27 RX ORDER — FLUTICASONE FUROATE AND VILANTEROL 100; 25 UG/1; UG/1
1 POWDER RESPIRATORY (INHALATION) DAILY
Qty: 3 INHALER | Refills: 3 | Status: SHIPPED | OUTPATIENT
Start: 2018-11-27 | End: 2019-12-09 | Stop reason: SDUPTHER

## 2018-11-27 RX ORDER — AZITHROMYCIN 250 MG/1
250 TABLET, FILM COATED ORAL DAILY
Qty: 6 TAB | Refills: 0 | Status: SHIPPED | OUTPATIENT
Start: 2018-11-27 | End: 2019-02-25

## 2018-11-27 NOTE — PROGRESS NOTES
Chief Complaint Patient presents with  Bronchiectasis  
  follow up from 6/15/2018  Asthma 1. Have you been to the ER, urgent care clinic since your last visit? Hospitalized since your last visit? No 
 
2. Have you seen or consulted any other health care providers outside of the [address] [place] [address] since your last visit? Include any pap smears or colon screening.  Yes Where: Dr. Blayne Trejo, PCP

## 2018-11-27 NOTE — PROGRESS NOTES
[facility] Pulmonary, Critical Care, and Sleep Medicine Pulmonary Office visit. Name: Shar Barrow : 1965 Date: 2018 Subjective:  
 
Patient is a 46 y.o. female initially referred for Hemoptysis . Now followed for bronchiectasis. Comes for follow up. Positive sputum culture for AFB- Nadine Correa 18 Patient has no new complaints since last visit in . Denies any hemoptysis, fever, chills Taking daily azithromycin ( suppressive treatment) and tolerating well with symptom control Was started on Rifampin and Ethambutol for Nadine Correa- LFT's noted to be abnormal >5 fold so instructed to stop meds. She reported feeling sick with malaise, aches, chills and anorexia while on meds. Feels much better now after stopping the meds. Using breo. Denies SOB or wheezing- uses rescue inhaler infrequently HPI: 
Patient reports being in good health and about 2 months back had an episode of spitting up blood X4 in an overnight period prompting visit to MD. She was prescribed antibiotics and improved. Further investigations with CXR and CT scan  Obtained with abnormalities on CT- Pulmonary consulted. She had since not had any further hemoptysis until 3 days back when she coughed up blood tinged mucus. She reports having a need to cough and clear her throat -usually in am. Denies any chronic cough. Denies any wheezing. Never diagnosed with asthma. She has seasonal increased nasal congestion, drainage  And postnasal drip- takes OTC Zrytec. Denies any fever, chills, weight loss. C/o chest pain- after eating certain foods. She has never smoked. No work in any industrial setting. She [place] [address] [facility] [address] [address] and moved to [place] as an adult. No travel to [place] [address]. 
She has a dog at home. No h/o childhood respiratory illness- whooping cough. No known TB exposure. Allergies Allergen Reactions  Percocet [Oxycodone-Acetaminophen] Nausea Only Current Outpatient Medications Medication Sig Dispense Refill  fluticasone-vilanterol (BREO ELLIPTA) 100-25 mcg/dose inhaler Take 1 Puff by inhalation daily. 1 Inhaler 6  
 fluticasone (FLONASE ALLERGY RELIEF) 50 mcg/actuation nasal spray 2 Sprays by Both Nostrils route daily.  VENTOLIN HFA 90 mcg/actuation inhaler Take 2 Puffs by inhalation every four (4) hours as needed. 1 Inhaler 6  MULTIVITAMIN PO Take  by mouth.  cholecalciferol, vitamin d3, (VITAMIN D) 1,000 unit tablet Take  by mouth daily.  ethambutol (MYAMBUTOL) 400 mg tablet Take 2 Tabs by mouth daily. 30 Tab 12  
 rifAMPin (RIFADIN) 300 mg capsule Take 2 Caps by mouth daily. 30 Cap 12 Review of Systems: 
HEENT: No epistaxis, no nasal drainage, no difficulty in swallowing, no redness in eyes Respiratory: as above Cardiovascular: no chest pain, no palpitations, no chronic leg edema, no syncope Gastrointestinal: no abd pain, no vomiting, no diarrhea, no bleeding symptoms Genitourinary: No urinary symptoms or hematuria Integument/breast: No ulcers or rashes Musculoskeletal:Neg 
Neurological: No focal weakness, no seizures, no headaches Behvioral/Psych: No anxiety, no depression Constitutional: No fever, no chills, no weight loss, no night sweats Objective:  
 
Visit Vitals /76 (BP 1 Location: Left arm, BP Patient Position: Sitting) Pulse 68 Temp 98.2 °F (36.8 °C) (Oral) Resp 16 Ht 5' 7\" (1.702 m) Wt 59.1 kg (130 lb 3.2 oz) LMP 10/20/2011 SpO2 99% BMI 20.39 kg/m² Physical Exam:  
General: comfortable, no acute distress HEENT: pupils reactive, sclera anicteric, EOM intact Neck: No adenopathy or thyroid swelling, no lymphadenopathy or JVD, supple CVS: S1S2 no murmurs RS: Mod AE bilaterally , no wheezing no tactile fremitus or egophony, no accessory muscle use Abd: soft, non tender, no hepatosplenomegaly Neuro: non focal, awake, alert Extrm: no leg edema, clubbing or cyanosis Skin: no rash Data review: IgE- WNL Allergy profile- 
  D. pteronyssinus <0.10  Class 0 kU/L Final  
  D. farinae Mite <0.10  Class 0 kU/L Final  
  Cat Hair/Dander <0.10  Class 0 kU/L Final  
  Dog Hair/Dander <0.10  Class 0 kU/L Final  
  Bermuda Grass 4.18 (A) Class IV kU/L Final  
  [place] grass 4.19 (A) Class IV kU/L Final  
  Umair Grass 2.79 (A) Class III kU/L Final  
  Bahia Grass 6.48 (A) Class IV kU/L Final  
  [id number] COCKROACH, AMERICAN <0.10  Class 0 kU/L Final  
  Penicillium notatum <0.10  Class 0 kU/L Final  
  Cladosporium herbarum <0.10  Class 0 kU/L Final  
  Aspergillus fumigatus <0.10  Class 0 kU/L Final  
  Mucor racemosus <0.10  Class 0 kU/L Final  
  Alternaria tenuis <0.10  Class 0 kU/L Final  
  Stemphylium botryosum <0.10  Class 0 kU/L Final  
  [id number] COMMON SILVER BIRCH 0.37 (A) Class I kU/L Final  
  [place] 1.67 (A) Class III kU/L Final  
  American White Elm 0.30 (A) Class 0/I kU/L Final  
  Maple/Box Elder 0.38 (A) Class I kU/L Final  
  White [place]ickory 0.70 (A) Class II kU/L Final  
  [id number] MAPLE LEAF [place] 0.20 (A) Class 0/I kU/L Final  
  [place] <0.10  Class 0 kU/L Final  
  Sweet Gum 0.25 (A) Class 0/I kU/L Final  
  Mountain [place] 0.23 (A) Class 0/I kU/L Final  
  Ragweed, Short/Common 0.34 (A) Class I kU/L Final  
  Mugwort 0.19 (A) Class 0/I kU/L Final  
  Plantain, English 0.23 (A) Class 0/I kU/L Final  
  Pigweed, Rough 0.26 (A) Class 0/I kU/L Final  
  Sheep [place]) 0.31 (A) Class 0/I kU/L Final  
  Nettle 0.14 (A) Class 0/I kU/L Final  
   
Narrative 4/27/2018 11:37 AM - [place], [facility] Lab Results In  
   
Component Results   
  Component  
  AFB SPECIMEN PROCESSING  
  Concentration  
  ACID FAST SMEAR  
  Negative  
  ACID FAST CULTURE (Abnormal)  
  Positive Acid-fast bacilli have been detected in culture at 2 weeks; see AFB  
  
  Comment:  
 
 
Imaging: I have personally reviewed the patients radiographs and have reviewed the reports and images: CXR Results  (Last 48 hours) None CT scan of chest ( [facility]) reviewed images- biapical pleural thickening, apical parenchymal scarring and RML bronchiectatic changes with scattered infiltrates, RUL bronchiectatic changes IMPRESSION:  
· Bronchiectasis- recent recurring hemoptysis- ? Superinfection with atypical mycobacteria. Cultures isolated Lilibeth Valderrama so initiated treatment but patient developed significant systemic symptoms of fatigue, malaise, anorexia, flu like aches and fevers/chills with > 5 fold increase in LFT's - likely all rifampin related so instructed to stop and has since recovered. ·  Lower respiratory tract infection:Predominant location in RML with some RUL distribution and biapical pleuroparenchymal infiltrates/fibrosis suggests a sequelae of remote inflammatory/granulomatous process. · CT scan (2016) with some scattered areas of infiltrates-Secondary to above · Hemoptysis secondary to above · Reactive airways disease- allergic vs secondary to above. 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 Repeat 10/2017- normal range. RECOMMENDATIONS:  
 
 
· Will hold off on antimycobacterial therapy · Continue Azithromycin- prescription provided · Will check HRCT and sputum for cultures and AFB periodically as indicted · May need bronchoscopy to evaluate further to identify location of bleeding and consider bronchial artery embolization vs other surgical options if hemoptysis significantly recurs. · Continue  Breo-ellipta , prn albuterol · Instructed to humidify ambient atmosphere- cool mist humidifier · Discussed strict allergen avoidance- grasses, trees. · Encouraged use of mask if outdoors in spring/fall · Strict GERD precautions and add PPI · Discussed at length diagnosis and treatment plan- written instructions given. · Will consider allergen therapy if unable to control with current  interventions · Discussed new lab results- specifically IgG subclass ( now normal) · Discussed vigilance and checking out for infectious /allergic triggers in work space · Preventive vaccinations- Pneumovax PPSV23 up to date · Will get Influenza vaccine- recieved · Will follow up Health maintenance screens deferred to Primary care provider.  
  
Corine Solis MD

## 2018-11-27 NOTE — LETTER
2018 1:53 PM 
 
Patient:  Venu Neal YOB: 1965 Date of Visit: 2018 Dear Edmond Mccarty MD 
[address] [place] [address] VIA Facsimile: [phone] 
 : Thank you for referring Ms. Carolina Sofia to me for evaluation/treatment. Below are the relevant portions of my assessment and plan of care. [facility] Pulmonary, Critical Care, and Sleep Medicine Pulmonary Office visit. Name: Venu Neal : 1965 Date: 2018 Subjective:  
 
Patient is a 46 y.o. female initially referred for Hemoptysis . Now followed for bronchiectasis. Comes for follow up. Positive sputum culture for AFB- Amanda Bryant 18 Patient has no new complaints since last visit in . Denies any hemoptysis, fever, chills Taking daily azithromycin ( suppressive treatment) and tolerating well with symptom control Was started on Rifampin and Ethambutol for Amanda Bryant- LFT's noted to be abnormal >5 fold so instructed to stop meds. She reported feeling sick with malaise, aches, chills and anorexia while on meds. Feels much better now after stopping the meds. Using breo. Denies SOB or wheezing- uses rescue inhaler infrequently HPI: 
Patient reports being in good health and about 2 months back had an episode of spitting up blood X4 in an overnight period prompting visit to MD. She was prescribed antibiotics and improved. Further investigations with CXR and CT scan  Obtained with abnormalities on CT- Pulmonary consulted. She had since not had any further hemoptysis until 3 days back when she coughed up blood tinged mucus. She reports having a need to cough and clear her throat -usually in am. Denies any chronic cough. Denies any wheezing. Never diagnosed with asthma. She has seasonal increased nasal congestion, drainage  And postnasal drip- takes OTC Zrytec. Denies any fever, chills, weight loss. C/o chest pain- after eating certain foods. She has never smoked. No work in any industrial setting. She grew up in [address] [place] [address] and moved to [place] as an adult. No travel to [place] [address]. 
She has a dog at home. No h/o childhood respiratory illness- whooping cough. No known TB exposure. Allergies Allergen Reactions  Percocet [Oxycodone-Acetaminophen] Nausea Only Current Outpatient Medications Medication Sig Dispense Refill  fluticasone-vilanterol (BREO ELLIPTA) 100-25 mcg/dose inhaler Take 1 Puff by inhalation daily. 1 Inhaler 6  
 fluticasone (FLONASE ALLERGY RELIEF) 50 mcg/actuation nasal spray 2 Sprays by Both Nostrils route daily.  VENTOLIN HFA 90 mcg/actuation inhaler Take 2 Puffs by inhalation every four (4) hours as needed. 1 Inhaler 6  MULTIVITAMIN PO Take  by mouth.  cholecalciferol, vitamin d3, (VITAMIN D) 1,000 unit tablet Take  by mouth daily.  ethambutol (MYAMBUTOL) 400 mg tablet Take 2 Tabs by mouth daily. 30 Tab 12  
 rifAMPin (RIFADIN) 300 mg capsule Take 2 Caps by mouth daily. 30 Cap 12 Review of Systems: 
HEENT: No epistaxis, no nasal drainage, no difficulty in swallowing, no redness in eyes Respiratory: as above Cardiovascular: no chest pain, no palpitations, no chronic leg edema, no syncope Gastrointestinal: no abd pain, no vomiting, no diarrhea, no bleeding symptoms Genitourinary: No urinary symptoms or hematuria Integument/breast: No ulcers or rashes Musculoskeletal:Neg 
Neurological: No focal weakness, no seizures, no headaches Behvioral/Psych: No anxiety, no depression Constitutional: No fever, no chills, no weight loss, no night sweats Objective: 
 
Visit Vitals /76 (BP 1 Location: Left arm, BP Patient Position: Sitting) Pulse 68 Temp 98.2 °F (36.8 °C) (Oral) Resp 16 Ht 5' 7\" (1.702 m) Wt 59.1 kg (130 lb 3.2 oz) LMP 10/20/2011 SpO2 99% BMI 20.39 kg/m² Physical Exam:  
General: comfortable, no acute distress HEENT: pupils reactive, sclera anicteric, EOM intact Neck: No adenopathy or thyroid swelling, no lymphadenopathy or JVD, supple CVS: S1S2 no murmurs RS: Mod AE bilaterally , no wheezing no tactile fremitus or egophony, no accessory muscle use Abd: soft, non tender, no hepatosplenomegaly Neuro: non focal, awake, alert Extrm: no leg edema, clubbing or cyanosis Skin: no rash Data review: IgE- WNL Allergy profile- 
  D. pteronyssinus <0.10  Class 0 kU/L Final  
  D. farinae Mite <0.10  Class 0 kU/L Final  
  Cat Hair/Dander <0.10  Class 0 kU/L Final  
  Dog Hair/Dander <0.10  Class 0 kU/L Final  
  [place] Grass 4.18 (A) Class IV kU/L Final  
  Chapin grass 4.19 (A) Class IV kU/L Final  
  Umair Grass 2.79 (A) Class III kU/L Final  
  Bahia Grass 6.48 (A) Class IV kU/L Final  
  [id number] COCKROACH, AMERICAN <0.10  Class 0 kU/L Final  
  Penicillium notatum <0.10  Class 0 kU/L Final  
  Cladosporium herbarum <0.10  Class 0 kU/L Final  
  Aspergillus fumigatus <0.10  Class 0 kU/L Final  
  Mucor racemosus <0.10  Class 0 kU/L Final  
  Alternaria tenuis <0.10  Class 0 kU/L Final  
  Stemphylium botryosum <0.10  Class 0 kU/L Final  
  [id number] COMMON SILVER BIRCH 0.37 (A) Class I kU/L Final  
  [place] 1.67 (A) Class III kU/L Final  
  American White Elm 0.30 (A) Class 0/I kU/L Final  
  Maple/Box Elder 0.38 (A) Class I kU/L Final  
  White Hickory 0.70 (A) Class II kU/L Final  
  [id number] MAPLE LEAF [place] 0.20 (A) Class 0/I kU/L Final  
  [place] <0.10  Class 0 kU/L Final  
  Sweet Gum 0.25 (A) Class 0/I kU/L Final  
  Mountain [place] 0.23 (A) Class 0/I kU/L Final  
  Ragweed, Short/Common 0.34 (A) Class I kU/L Final  
  Mugwort 0.19 (A) Class 0/I kU/L Final  
  Plantain, English 0.23 (A) Class 0/I kU/L Final  
  Pigweed, Rough 0.26 (A) Class 0/I kU/L Final  
  Sheep [place] [facility]) 0.31 (A) Class 0/I kU/L Final  
   Nettle 0.14 (A) Class 0/I kU/L Final  
   
Narrative 4/27/2018 11:37 AM - [place], [facility] Lab Results In  
   
Component Results   
  Component  
  AFB SPECIMEN PROCESSING  
  Concentration  
  ACID FAST SMEAR  
  Negative  
  ACID FAST CULTURE (Abnormal)  
  Positive Acid-fast bacilli have been detected in culture at 2 weeks; see AFB  
  
  Comment:  
 
 
Imaging: 
I have personally reviewed the patients radiographs and have reviewed the reports and images: CXR Results  (Last 48 hours) None CT scan of chest ( [facility]) reviewed images- biapical pleural thickening, apical parenchymal scarring and RML bronchiectatic changes with scattered infiltrates, RUL bronchiectatic changes IMPRESSION:  
· Bronchiectasis- recent recurring hemoptysis- ? Superinfection with atypical mycobacteria. Cultures isolated Ki Logan so initiated treatment but patient developed significant systemic symptoms of fatigue, malaise, anorexia, flu like aches and fevers/chills with > 5 fold increase in LFT's - likely all rifampin related so instructed to stop and has since recovered. ·  Lower respiratory tract infection:Predominant location in RML with some RUL distribution and biapical pleuroparenchymal infiltrates/fibrosis suggests a sequelae of remote inflammatory/granulomatous process. · CT scan (2016) with some scattered areas of infiltrates-Secondary to above · Hemoptysis secondary to above · Reactive airways disease- allergic vs secondary to above. 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 Repeat 10/2017- normal range. RECOMMENDATIONS:  
 
 
· Will hold off on antimycobacterial therapy · Continue Azithromycin- prescription provided · Will check HRCT and sputum for cultures and AFB periodically as indicted · May need bronchoscopy to evaluate further to identify location of bleeding and consider bronchial artery embolization vs other surgical options if hemoptysis significantly recurs. · Continue  Breo-ellipta , prn albuterol · Instructed to humidify ambient atmosphere- cool mist humidifier · Discussed strict allergen avoidance- grasses, trees. · Encouraged use of mask if outdoors in spring/fall · Strict GERD precautions and add PPI · Discussed at length diagnosis and treatment plan- written instructions given. · Will consider allergen therapy if unable to control with current  interventions · Discussed new lab results- specifically IgG subclass ( now normal) · Discussed vigilance and checking out for infectious /allergic triggers in work space · Preventive vaccinations- Pneumovax PPSV23 up to date · Will get Influenza vaccine- recieved · Will follow up Health maintenance screens deferred to Primary care provider. Sanjuana Elizabeth MD 
 
 
 
 
 
 
If you have questions, please do not hesitate to call me. I look forward to following Ms. Elliott along with you.  
 
 
 
Sincerely, 
 
 
Sanjuana Elizabeth MD

## 2018-11-27 NOTE — COMMUNICATION BODY
[facility] Pulmonary, Critical Care, and Sleep Medicine Pulmonary Office visit. Name: Ayan Solorio : 1965 Date: 2018 Subjective:  
 
Patient is a 46 y.o. female initially referred for Hemoptysis . Now followed for bronchiectasis. Comes for follow up. Positive sputum culture for AFB- [employer]cas [employer] 18 Patient has no new complaints since last visit in . Denies any hemoptysis, fever, chills Taking daily azithromycin ( suppressive treatment) and tolerating well with symptom control Was started on Rifampin and Ethambutol for [place]- LFT's noted to be abnormal >5 fold so instructed to stop meds. She reported feeling sick with malaise, aches, chills and anorexia while on meds. Feels much better now after stopping the meds. Using breo. Denies SOB or wheezing- uses rescue inhaler infrequently HPI: 
Patient reports being in good health and about 2 months back had an episode of spitting up blood X4 in an overnight period prompting visit to MD. She was prescribed antibiotics and improved. Further investigations with CXR and CT scan  Obtained with abnormalities on CT- Pulmonary consulted. She had since not had any further hemoptysis until 3 days back when she coughed up blood tinged mucus. She reports having a need to cough and clear her throat -usually in am. Denies any chronic cough. Denies any wheezing. Never diagnosed with asthma. She has seasonal increased nasal congestion, drainage  And postnasal drip- takes OTC Zrytec. Denies any fever, chills, weight loss. C/o chest pain- after eating certain foods. She has never smoked. No work in any industrial setting. She [place] [address] [address] and moved to [place] as an adult. No travel to [place] [address]. 
She has a dog at home. No h/o childhood respiratory illness- whooping cough. No known TB exposure. Allergies Allergen Reactions  Percocet [Oxycodone-Acetaminophen] Nausea Only Current Outpatient Medications Medication Sig Dispense Refill  fluticasone-vilanterol (BREO ELLIPTA) 100-25 mcg/dose inhaler Take 1 Puff by inhalation daily. 1 Inhaler 6  
 fluticasone (FLONASE ALLERGY RELIEF) 50 mcg/actuation nasal spray 2 Sprays by Both Nostrils route daily.  VENTOLIN HFA 90 mcg/actuation inhaler Take 2 Puffs by inhalation every four (4) hours as needed. 1 Inhaler 6  MULTIVITAMIN PO Take  by mouth.  cholecalciferol, vitamin d3, (VITAMIN D) 1,000 unit tablet Take  by mouth daily.  ethambutol (MYAMBUTOL) 400 mg tablet Take 2 Tabs by mouth daily. 30 Tab 12  
 rifAMPin (RIFADIN) 300 mg capsule Take 2 Caps by mouth daily. 30 Cap 12 Review of Systems: 
HEENT: No epistaxis, no nasal drainage, no difficulty in swallowing, no redness in eyes Respiratory: as above Cardiovascular: no chest pain, no palpitations, no chronic leg edema, no syncope Gastrointestinal: no abd pain, no vomiting, no diarrhea, no bleeding symptoms Genitourinary: No urinary symptoms or hematuria Integument/breast: No ulcers or rashes Musculoskeletal:Neg 
Neurological: No focal weakness, no seizures, no headaches Behvioral/Psych: No anxiety, no depression Constitutional: No fever, no chills, no weight loss, no night sweats Objective:  
 
Visit Vitals /76 (BP 1 Location: Left arm, BP Patient Position: Sitting) Pulse 68 Temp 98.2 °F (36.8 °C) (Oral) Resp 16 Ht 5' 7\" (1.702 m) Wt 59.1 kg (130 lb 3.2 oz) LMP 10/20/2011 SpO2 99% BMI 20.39 kg/m² Physical Exam:  
General: comfortable, no acute distress HEENT: pupils reactive, sclera anicteric, EOM intact Neck: No adenopathy or thyroid swelling, no lymphadenopathy or JVD, supple CVS: S1S2 no murmurs RS: Mod AE bilaterally , no wheezing no tactile fremitus or egophony, no accessory muscle use Abd: soft, non tender, no hepatosplenomegaly Neuro: non focal, awake, alert Extrm: no leg edema, clubbing or cyanosis Skin: no rash Data review: IgE- WNL Allergy profile- 
  D. pteronyssinus <0.10  Class 0 kU/L Final  
  D. farinae Mite <0.10  Class 0 kU/L Final  
  Cat Hair/Dander <0.10  Class 0 kU/L Final  
  Dog Hair/Dander <0.10  Class 0 kU/L Final  
  Bermuda Grass 4.18 (A) Class IV kU/L Final  
  Chapin grass 4.19 (A) Class IV kU/L Final  
  Umair Grass 2.79 (A) Class III kU/L Final  
  Bahia Grass 6.48 (A) Class IV kU/L Final  
  [id number] COCKROACH, AMERICAN <0.10  Class 0 kU/L Final  
  Penicillium notatum <0.10  Class 0 kU/L Final  
  Cladosporium herbarum <0.10  Class 0 kU/L Final  
  Aspergillus fumigatus <0.10  Class 0 kU/L Final  
  Mucor racemosus <0.10  Class 0 kU/L Final  
  Alternaria tenuis <0.10  Class 0 kU/L Final  
  Stemphylium botryosum <0.10  Class 0 kU/L Final  
  [id number] COMMON SILVER BIRCH 0.37 (A) Class I kU/L Final  
  Buckley 1.67 (A) Class III kU/L Final  
  American White Elm 0.30 (A) Class 0/I kU/L Final  
  Maple/Box Elder 0.38 (A) Class I kU/L Final  
  White Hickory 0.70 (A) Class II kU/L Final  
  [id number] MAPLE LEAF [place]ORE 0.20 (A) Class 0/I kU/L Final  
  White Kirkman <0.10  Class 0 kU/L Final  
  Sweet Gum 0.25 (A) Class 0/I kU/L Final  
  Mountain Candler 0.23 (A) Class 0/I kU/L Final  
  Ragweed, Short/Common 0.34 (A) Class I kU/L Final  
  Mugwort 0.19 (A) Class 0/I kU/L Final  
  Plantain, English 0.23 (A) Class 0/I kU/L Final  
  Pigweed, Rough 0.26 (A) Class 0/I kU/L Final  
  Sheep Sorrel (Dock) 0.31 (A) Class 0/I kU/L Final  
  Nettle 0.14 (A) Class 0/I kU/L Final  
   
Narrative 4/27/2018 11:37 AM - [place], [facility] Lab Results In  
   
Component Results   
  Component  
  AFB SPECIMEN PROCESSING  
  Concentration  
  ACID FAST SMEAR  
  Negative  
  ACID FAST CULTURE (Abnormal)  
  Positive Acid-fast bacilli have been detected in culture at 2 weeks; see AFB  
  
  Comment:  
 
 
Imaging: I have personally reviewed the patients radiographs and have reviewed the reports and images: CXR Results  (Last 48 hours) None CT scan of chest ( [facility]) reviewed images- biapical pleural thickening, apical parenchymal scarring and RML bronchiectatic changes with scattered infiltrates, RUL bronchiectatic changes IMPRESSION:  
· Bronchiectasis- recent recurring hemoptysis- ? Superinfection with atypical mycobacteria. Cultures isolated Michelle Barrera so initiated treatment but patient developed significant systemic symptoms of fatigue, malaise, anorexia, flu like aches and fevers/chills with > 5 fold increase in LFT's - likely all rifampin related so instructed to stop and has since recovered. ·  Lower respiratory tract infection:Predominant location in RML with some RUL distribution and biapical pleuroparenchymal infiltrates/fibrosis suggests a sequelae of remote inflammatory/granulomatous process. · CT scan (2016) with some scattered areas of infiltrates-Secondary to above · Hemoptysis secondary to above · Reactive airways disease- allergic vs secondary to above. 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 Repeat 10/2017- normal range. RECOMMENDATIONS:  
 
 
· Will hold off on antimycobacterial therapy · Continue Azithromycin- prescription provided · Will check HRCT and sputum for cultures and AFB periodically as indicted · May need bronchoscopy to evaluate further to identify location of bleeding and consider bronchial artery embolization vs other surgical options if hemoptysis significantly recurs. · Continue  Breo-ellipta , prn albuterol · Instructed to humidify ambient atmosphere- cool mist humidifier · Discussed strict allergen avoidance- grasses, trees. · Encouraged use of mask if outdoors in spring/fall · Strict GERD precautions and add PPI · Discussed at length diagnosis and treatment plan- written instructions given. · Will consider allergen therapy if unable to control with current  interventions · Discussed new lab results- specifically IgG subclass ( now normal) · Discussed vigilance and checking out for infectious /allergic triggers in work space · Preventive vaccinations- Pneumovax PPSV23 up to date · Will get Influenza vaccine- recieved · Will follow up Health maintenance screens deferred to Primary care provider.  
  
Garima Otto MD

## 2019-03-04 RX ORDER — ALBUTEROL SULFATE 90 UG/1
AEROSOL, METERED RESPIRATORY (INHALATION)
Qty: 1 INHALER | Refills: 6 | Status: SHIPPED | OUTPATIENT
Start: 2019-03-04 | End: 2021-12-02 | Stop reason: SDUPTHER

## 2019-05-07 RX ORDER — AZITHROMYCIN 250 MG/1
TABLET, FILM COATED ORAL
Qty: 30 TAB | Refills: 12 | Status: SHIPPED | OUTPATIENT
Start: 2019-05-07 | End: 2020-04-05

## 2019-05-30 ENCOUNTER — OFFICE VISIT (OUTPATIENT)
Dept: PULMONOLOGY | Age: 54
End: 2019-05-30

## 2019-05-30 VITALS
BODY MASS INDEX: 21.19 KG/M2 | DIASTOLIC BLOOD PRESSURE: 62 MMHG | TEMPERATURE: 98.5 F | HEIGHT: 67 IN | RESPIRATION RATE: 16 BRPM | OXYGEN SATURATION: 98 % | SYSTOLIC BLOOD PRESSURE: 108 MMHG | HEART RATE: 77 BPM | WEIGHT: 135 LBS

## 2019-05-30 DIAGNOSIS — J47.9 BRONCHIECTASIS WITHOUT ACUTE EXACERBATION (HCC): Primary | ICD-10-CM

## 2019-05-30 DIAGNOSIS — J30.9 ALLERGIC RHINITIS, UNSPECIFIED SEASONALITY, UNSPECIFIED TRIGGER: ICD-10-CM

## 2019-05-30 DIAGNOSIS — J45.20 MILD INTERMITTENT ASTHMA WITHOUT COMPLICATION: ICD-10-CM

## 2019-05-30 NOTE — PROGRESS NOTES
Sandra  presents today for   Chief Complaint   Patient presents with    Bronchiectasis     follow up from 11/27/2018 (seen by Dr. Chandana Kerr)    Asthma    Allergic Rhinitis       Is someone accompanying this pt? No    Is the patient using any DME equipment during OV? No    -DME Company N/A    Depression Screening:  3 most recent PHQ Screens 5/30/2019   Little interest or pleasure in doing things Not at all   Feeling down, depressed, irritable, or hopeless Not at all   Total Score PHQ 2 0       Learning Assessment:  Learning Assessment 7/19/2017   PRIMARY LEARNER Patient   HIGHEST LEVEL OF EDUCATION - PRIMARY LEARNER  SOME COLLEGE   PRIMARY LANGUAGE ENGLISH   LEARNER PREFERENCE PRIMARY READING     DEMONSTRATION     LISTENING   ANSWERED BY patient     bsps   RELATIONSHIP SELF       Abuse Screening:  No flowsheet data found. Fall Risk  No flowsheet data found. Coordination of Care:  1. Have you been to the ER, urgent care clinic since your last visit? Hospitalized since your last visit? No    2. Have you seen or consulted any other health care providers outside of the [address] [place] [address] since your last visit? Include any pap smears or colon screening. Yes. Dr. Yvette Randall, PCP    Medication variance in dosage/sig per patient as follows: None.

## 2019-05-30 NOTE — PROGRESS NOTES
[facility]  Pulmonary, Critical Care, and Sleep Medicine      Pulmonary Office Progress Notes    Name: Henrik Hudson     : 1965     Date: 2019        Subjective:     2019          HPI    Henrik Hudson  is a 46 y.o. female with PMH of bronchiectasis and allergic rhinitis who presents for routine follow-up visit. She was last seen here on 2018 by Dr. Jessica Durant for follow-up visit regarding complaints of cough and hemoptysis that had not improved after several courses of antibiotics. Her sputum culture was positive for AFB-[place]ii and she had started treatment of rifampin with ethambutol. She was soon instructed to discontinue antibiotics due to elevated LFTs and she was started on daily azithromycin 250 mg. Today she appears comfortable, in no distress and reports that she has had significant improvement in symptoms. She denies shortness of breath, wheezing, chest pain or hemoptysis. No fever, chills or orthopnea; no leg/calf pain or swelling and no decreased appetite or weight loss. She has seasonal allergies that are controlled with Flonase and over-the-counter Zyrtec daily. She reports an occasional, intermittent cough that has not worsened and is improved with use of humidifier at night. She continues to take [place] American, Zithromax 250 mg daily and albuterol as needed, which is only about twice per month. Her PFTs were completed on 2016, demonstrating mild obstructive defect with normal diffusion capacity.         Past Medical History:   Diagnosis Date    Allergic rhinitis     CTS (carpal tunnel syndrome)     Gastritis     Mild intermittent asthma without complication 86/3/4946    Trochanteric bursitis        Allergies   Allergen Reactions    Percocet [Oxycodone-Acetaminophen] Nausea Only       Current Outpatient Medications   Medication Sig Dispense Refill    azithromycin (ZITHROMAX) 250 mg tablet TAKE 1 TABLET BY MOUTH ONCE DAILY 30 Tab 12    VENTOLIN HFA 90 mcg/actuation inhaler INHALE TWO PUFFS BY MOUTH EVERY 4 HOURS AS NEEDED 1 Inhaler 6    fluticasone-vilanterol (BREO ELLIPTA) 100-25 mcg/dose inhaler Take 1 Puff by inhalation daily. 3 Inhaler 3    fluticasone (FLONASE ALLERGY RELIEF) 50 mcg/actuation nasal spray 2 Sprays by Both Nostrils route daily.  MULTIVITAMIN PO Take 1 Tab by mouth daily.  cholecalciferol, vitamin d3, (VITAMIN D) 1,000 unit tablet Take 1,000 Units by mouth daily. Review of Systems:    HEENT: No epistaxis,  no difficulty in swallowing, no redness in eyes. Allergic rhinitis  Respiratory: As stated above in HPI  Cardiovascular: no chest pain, no palpitations, no chronic leg edema, no syncope  Gastrointestinal: no abd pain, no vomiting, no diarrhea, no bleeding symptoms  Genitourinary: No urinary symptoms or hematuria  Integument/breast: No ulcers or rashes  Musculoskeletal: No leg / calf pain  Neurological: No focal weakness, no seizures, no headaches  Behvioral/Psych: No anxiety, no depression  Constitutional: No fever, chills or night sweats. No decreased appetite or weight loss     Objective:     Visit Vitals  /62 (BP 1 Location: Left arm, BP Patient Position: Sitting)   Pulse 77   Temp 98.5 °F (36.9 °C) (Oral)   Resp 16   Ht 5' 7\" (1.702 m)   Wt 61.2 kg (135 lb)   LMP 10/20/2011   SpO2 98%   BMI 21.14 kg/m²        PHYSICAL EXAM      General: Oriented to person, place, and time. Well-developed, well-nourished, and in no distress      Head:   Normocephalic, without obvious abnormality, atraumatic       Eyes:   Pupils reactive, conjunctivae / corneas clear. EOM's intact, no scleral icterus       Nose:   Nares normal, no drainage. Throat:    Lips, mucosa and tongue normal. Teeth and gums normal       Neck:   Supple, symmetrical, trachea midline. No adenopathy or thyroid swelling; no carotid bruit or JVD. CVS:    Regular rate and rhythm.  S1S2 normal,  no murmurs       RS:      Symmetrical chest rise, good AE bilaterally. Lung sounds clear to auscultation bilaterally. No wheezing, rales or rhonchi, no accessory muscle use      Abd:     Soft, non-tender. No hepatosplenomegaly                                                     Neuro:   non focal, awake, alert and oriented to person, place, time and situation    Extrm:   no leg edema,  clubbing or cyanosis       Skin:   no rash    Data review:     No visits with results within 6 Month(s) from this visit. Latest known visit with results is:   Orders Only on 05/30/2018   Component Date Value Ref Range Status    Protein, total 06/23/2018 7.1  6.0 - 8.5 g/dL Final    Albumin 06/23/2018 4.7  3.5 - 5.5 g/dL Final    Bilirubin, total 06/23/2018 0.6  0.0 - 1.2 mg/dL Final    Bilirubin, direct 06/23/2018 0.17  0.00 - 0.40 mg/dL Final    Alk. phosphatase 06/23/2018 72  39 - 117 IU/L Final    AST (SGOT) 06/23/2018 19  0 - 40 IU/L Final    ALT (SGPT) 06/23/2018 17  0 - 32 IU/L Final                   PULMONARY FUNCTION TESTS    Date FVC FEV1  FEV1/FVC VTK20-22 TLC RV RV/TLC VC DLCO   9/26/2016  82%  76%  76  60%  91%  107%  118%  82%  24.54  95%                                             Imaging:  I have personally reviewed the patients radiographs and have reviewed the reports:  XR Results (most recent):  Results from Appointment encounter on 03/15/18   XR CHEST PA LAT    Narrative Chest AP and Lateral views. HISTORY:  hemoptysis    COMPARISON: September 29, 2011. FINDINGS:     PA and lateral views of the chest were obtained. The cardiac silhouette is normal.     The lungs are clear. Pulmonary vascularity is normal. The costophrenic angles  are sharply defined. Lateral view showed sharp posterior costophrenic angles. Thoracic spine demonstrated no compression fractures.       Impression IMPRESSION:    No acute findings in the chest.        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 Well-controlled with ICS/LABA and albuterol as needed      RECOMMENDATIONS:   · Continue Breo Ellipta 100/25 mcg, 1 inhalation once daily, rinse mouth out after use. Discussed appropriate use of respiratory maintenance medications with patient  · Continue a azithromycin 250 mg p.o. daily  · Continue albuterol MDI every 4-6 hours as needed for increased shortness of breath, wheezing or cough - discussed appropriate use of rescue inhaler with patient  · Continue Flonase for nasal congestion/drainage  · Continue over-the-counter Zyrtec for seasonal allergies  · Take over-the-counter Mucinex for cough-reviewed airway clearance techniques with patient  · Continue use of humidifier at night  · Will check HRCT and sputum for cultures periodically if indicated  · Preventative immunizations reviewed and up-to-date  · Follow up in pulmonary clinic in 6 months     or sooner with worsening of symptoms  · Report to ER with chest pain or difficulty breathing.         Karri Chairez NP

## 2019-12-09 RX ORDER — FLUTICASONE FUROATE AND VILANTEROL TRIFENATATE 100; 25 UG/1; UG/1
POWDER RESPIRATORY (INHALATION)
Qty: 3 INHALER | Refills: 3 | Status: SHIPPED | OUTPATIENT
Start: 2019-12-09 | End: 2020-07-01 | Stop reason: SDUPTHER

## 2020-01-18 ENCOUNTER — HOSPITAL ENCOUNTER (OUTPATIENT)
Dept: MAMMOGRAPHY | Age: 55
Discharge: HOME | End: 2020-01-18
Attending: FAMILY MEDICINE
Payer: COMMERCIAL

## 2020-01-18 DIAGNOSIS — Z12.31 VISIT FOR SCREENING MAMMOGRAM: ICD-10-CM

## 2020-01-18 PROCEDURE — 77063 BREAST TOMOSYNTHESIS BI: CPT

## 2020-01-21 ENCOUNTER — OFFICE VISIT (OUTPATIENT)
Dept: PULMONOLOGY | Age: 55
End: 2020-01-21

## 2020-01-21 VITALS
HEART RATE: 64 BPM | HEIGHT: 67 IN | TEMPERATURE: 98.4 F | WEIGHT: 135.2 LBS | DIASTOLIC BLOOD PRESSURE: 67 MMHG | BODY MASS INDEX: 21.22 KG/M2 | SYSTOLIC BLOOD PRESSURE: 113 MMHG | OXYGEN SATURATION: 97 % | RESPIRATION RATE: 18 BRPM

## 2020-01-21 DIAGNOSIS — J47.9 BRONCHIECTASIS WITHOUT COMPLICATION (HCC): ICD-10-CM

## 2020-01-21 DIAGNOSIS — J45.20 MILD INTERMITTENT ASTHMA WITHOUT COMPLICATION: ICD-10-CM

## 2020-01-21 DIAGNOSIS — A31.0 MYCOBACTERIUM KANSASII INFECTION (HCC): Primary | ICD-10-CM

## 2020-01-21 DIAGNOSIS — J30.9 ALLERGIC RHINITIS, UNSPECIFIED SEASONALITY, UNSPECIFIED TRIGGER: ICD-10-CM

## 2020-01-21 NOTE — COMMUNICATION BODY
[facility] Pulmonary, Critical Care, and Sleep Medicine Pulmonary Office visit. Name: Kirstin Oropeza : 1965 Date: 2020 Subjective:  
 
Patient is a 53 y.o. female initially referred for Hemoptysis . Now followed for bronchiectasis. Comes for follow up. Positive sputum culture for AFB- M. [place]ii 
 
20 Patient has no new complaints since last visit in May 2019 Denies any significant cough or productive expectoration. Had mild episode of respiratory tract infection when she needed to use her albuterol more frequently back to normal now. Denies any hemoptysis, fever, chills Taking daily azithromycin ( suppressive treatment) and tolerating well with symptom control Was started on Rifampin and Ethambutol for Kt Mano- LFT's noted to be abnormal >5 fold so instructed to stop meds. She reported feeling sick with malaise, aches, chills and anorexia while on meds. Feels much better now after stopping the meds. Using breo. Denies SOB or wheezing- uses rescue inhaler infrequently HPI: 
Patient reports being in good health and about 2 months back had an episode of spitting up blood X4 in an overnight period prompting visit to MD. She was prescribed antibiotics and improved. Further investigations with CXR and CT scan  Obtained with abnormalities on CT- Pulmonary consulted. She had since not had any further hemoptysis until 3 days back when she coughed up blood tinged mucus. She reports having a need to cough and clear her throat -usually in am. Denies any chronic cough. Denies any wheezing. Never diagnosed with asthma. She has seasonal increased nasal congestion, drainage  And postnasal drip- takes OTC Zrytec. Denies any fever, chills, weight loss. C/o chest pain- after eating certain foods. She has never smoked. No work in any industrial setting. She grew up in [address] [place], [place] [address]y 1 and moved to [place] as an adult.  No travel to [place] [address]. 
She has a dog at home. No h/o childhood respiratory illness- whooping cough. No known TB exposure. Allergies Allergen Reactions  Percocet [Oxycodone-Acetaminophen] Nausea Only Current Outpatient Medications Medication Sig Dispense Refill  BREO ELLIPTA 100-25 mcg/dose inhaler INHALE 1 PUFF BY MOUTH ONCE DAILY 3 Inhaler 3  
 azithromycin (ZITHROMAX) 250 mg tablet TAKE 1 TABLET BY MOUTH ONCE DAILY 30 Tab 12  
 VENTOLIN HFA 90 mcg/actuation inhaler INHALE TWO PUFFS BY MOUTH EVERY 4 HOURS AS NEEDED 1 Inhaler 6  
 fluticasone (FLONASE ALLERGY RELIEF) 50 mcg/actuation nasal spray 2 Sprays by Both Nostrils route daily.  MULTIVITAMIN PO Take 1 Tab by mouth daily.  cholecalciferol, vitamin d3, (VITAMIN D) 1,000 unit tablet Take 1,000 Units by mouth daily. Review of Systems: 
HEENT: No epistaxis, no nasal drainage, no difficulty in swallowing, no redness in eyes Respiratory: as above Cardiovascular: no chest pain, no palpitations, no chronic leg edema, no syncope Gastrointestinal: no abd pain, no vomiting, no diarrhea, no bleeding symptoms Genitourinary: No urinary symptoms or hematuria Integument/breast: No ulcers or rashes Musculoskeletal:Neg 
Neurological: No focal weakness, no seizures, no headaches Behvioral/Psych: No anxiety, no depression Constitutional: No fever, no chills, no weight loss, no night sweats Objective:  
 
Visit Vitals /67 (BP 1 Location: Right arm, BP Patient Position: Sitting) Pulse 64 Temp 98.4 °F (36.9 °C) (Oral) Resp 18 Ht 5' 7\" (1.702 m) Wt 61.3 kg (135 lb 3.2 oz) LMP 10/20/2011 SpO2 97% BMI 21.18 kg/m² Physical Exam:  
General: comfortable, no acute distress HEENT: pupils reactive, sclera anicteric, EOM intact Neck: No adenopathy or thyroid swelling, no lymphadenopathy or JVD, supple CVS: S1S2 no murmurs RS: Mod AE bilaterally , no wheezing no tactile fremitus or egophony, no accessory muscle use Abd: soft, non tender, no hepatosplenomegaly Neuro: non focal, awake, alert Extrm: no leg edema, clubbing or cyanosis Skin: no rash Data review: IgE- WNL Allergy profile- 
  D. pteronyssinus <0.10  Class 0 kU/L Final  
  D. farinae Mite <0.10  Class 0 kU/L Final  
  Cat Hair/Dander <0.10  Class 0 kU/L Final  
  Dog Hair/Dander <0.10  Class 0 kU/L Final  
  Bermuda Grass 4.18 (A) Class IV kU/L Final  
  Chapin grass 4.19 (A) Class IV kU/L Final  
  Umair Grass 2.79 (A) Class III kU/L Final  
  Bahia Grass 6.48 (A) Class IV kU/L Final  
  [id number] COCKROACH, AMERICAN <0.10  Class 0 kU/L Final  
  Penicillium notatum <0.10  Class 0 kU/L Final  
  Cladosporium herbarum <0.10  Class 0 kU/L Final  
  Aspergillus fumigatus <0.10  Class 0 kU/L Final  
  Mucor racemosus <0.10  Class 0 kU/L Final  
  Alternaria tenuis <0.10  Class 0 kU/L Final  
  Stemphylium botryosum <0.10  Class 0 kU/L Final  
  [id number] COMMON SILVER BIRCH 0.37 (A) Class I kU/L Final  
  [place] 1.67 (A) Class III kU/L Final  
  American White Elm 0.30 (A) Class 0/I kU/L Final  
  Maple/Box Elder 0.38 (A) Class I kU/L Final  
  White Hickory 0.70 (A) Class II kU/L Final  
  [id number] MAPLE LEAF SYCAMORE 0.20 (A) Class 0/I kU/L Final  
  White [place] <0.10  Class 0 kU/L Final  
  Sweet Gum 0.25 (A) Class 0/I kU/L Final  
  Mountain [place] 0.23 (A) Class 0/I kU/L Final  
  Ragweed, Short/Common 0.34 (A) Class I kU/L Final  
  Mugwort 0.19 (A) Class 0/I kU/L Final  
  Plantain, English 0.23 (A) Class 0/I kU/L Final  
  Pigweed, Rough 0.26 (A) Class 0/I kU/L Final  
  Sheep Sorrel (Dock) 0.31 (A) Class 0/I kU/L Final  
  Nettle 0.14 (A) Class 0/I kU/L Final  
   
Narrative 4/27/2018 11:37 AM - Tyrel, [facility] Lab Results In  
   
Component Results   
  Component  
  AFB SPECIMEN PROCESSING  
  Concentration  
  ACID FAST SMEAR  
  Negative  
  ACID FAST CULTURE (Abnormal)  
  Positive Acid-fast bacilli have been detected in culture at 2 weeks; see AFB  
  
  Comment:  
 
 
Imaging: 
I have personally reviewed the patients radiographs and have reviewed the reports and images: CXR Results  (Last 48 hours) None CT scan of chest ( [facility]) reviewed images- biapical pleural thickening, apical parenchymal scarring and RML bronchiectatic changes with scattered infiltrates, RUL bronchiectatic changes IMPRESSION:  
· Bronchiectasis- recent recurring hemoptysis- ? Superinfection with atypical mycobacteria. Cultures isolated Meir Garcia so initiated treatment but patient developed significant systemic symptoms of fatigue, malaise, anorexia, flu like aches and fevers/chills with > 5 fold increase in LFT's - likely all rifampin related so instructed to stop and has since recovered. ·  Lower respiratory tract infection:Predominant location in RML with some RUL distribution and biapical pleuroparenchymal infiltrates/fibrosis suggests a sequelae of remote inflammatory/granulomatous process. · CT scan (2016) with some scattered areas of infiltrates-Secondary to above · Hemoptysis secondary to above · Reactive airways disease- allergic vs secondary to above. 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 Repeat 10/2017- normal range. RECOMMENDATIONS:  
 
· Continue Azithromycin- will change to 3 times a week for chronic suppressive treatment · Will check HRCT and sputum for cultures and AFB periodically as indicted · Continue  Breo-ellipta , prn albuterol · Instructed to humidify ambient atmosphere- cool mist humidifier · Discussed strict allergen avoidance- grasses, trees. · Encouraged use of mask if outdoors in spring/fall · Strict GERD precautions and add PPI · Discussed at length diagnosis and treatment plan- written instructions given. · Will consider allergen therapy if unable to control with current  interventions · Discussed new lab results- specifically IgG subclass ( now normal) · Discussed vigilance and checking out for infectious /allergic triggers in work space · Preventive vaccinations- Pneumovax PPSV23 up to date ·  Influenza vaccine- recieved · Will follow up Health maintenance screens deferred to Primary care provider.  
  
Marya Pichardo MD

## 2020-01-21 NOTE — PROGRESS NOTES
Ivan Roque presents today for No chief complaint on file. Is someone accompanying this pt? No    Is the patient using any DME equipment during OV? No    -DME Company NA    Depression Screening:  3 most recent PHQ Screens 1/21/2020   Little interest or pleasure in doing things Not at all   Feeling down, depressed, irritable, or hopeless Not at all   Total Score PHQ 2 0       Learning Assessment:  Learning Assessment 7/19/2017   PRIMARY LEARNER Patient   HIGHEST LEVEL OF EDUCATION - PRIMARY LEARNER  SOME COLLEGE   PRIMARY LANGUAGE ENGLISH   LEARNER PREFERENCE PRIMARY READING     DEMONSTRATION     LISTENING   ANSWERED BY patient     bsps   RELATIONSHIP SELF       Abuse Screening:  No flowsheet data found. Fall Risk  No flowsheet data found. Coordination of Care:  1. Have you been to the ER, urgent care clinic since your last visit? Hospitalized since your last visit? No    2. Have you seen or consulted any other health care providers outside of the [address] [place] [address] since your last visit? Include any pap smears or colon screening.  No.

## 2020-01-21 NOTE — LETTER
1/21/20 Patient: Benita Jones YOB: 1965 Date of Visit: 1/21/2020 Eunice Leigh MD 
[address] [address] [place] [address] VIA Facsimile: [phone] Dear Eunice Leigh MD, Thank you for referring Ms. Erlinda Conway to [address] [place] [address] for evaluation. My notes for this consultation are attached. If you have questions, please do not hesitate to call me. I look forward to following your patient along with you.  
 
 
Sincerely, 
 
Alta Mcdermott MD

## 2020-01-21 NOTE — PROGRESS NOTES
[facility]  Pulmonary, Critical Care, and Sleep Medicine      Pulmonary Office visit. Name: Arleen Armstrong     : 1965     Date: 2020        Subjective:     Patient is a 53 y.o. female initially referred for Hemoptysis . Now followed for bronchiectasis. Comes for follow up. Positive sputum culture for AFB- M. [place]ii    20    Patient has no new complaints since last visit in May 2019  Denies any significant cough or productive expectoration. Had mild episode of respiratory tract infection when she needed to use her albuterol more frequently back to normal now. Denies any hemoptysis, fever, chills  Taking daily azithromycin ( suppressive treatment) and tolerating well with symptom control  Was started on Rifampin and Ethambutol for Sonya Gallegos- LFT's noted to be abnormal >5 fold so instructed to stop meds. She reported feeling sick with malaise, aches, chills and anorexia while on meds. Feels much better now after stopping the meds. Using breo. Denies SOB or wheezing- uses rescue inhaler infrequently    HPI:  Patient reports being in good health and about 2 months back had an episode of spitting up blood X4 in an overnight period prompting visit to MD. She was prescribed antibiotics and improved. Further investigations with CXR and CT scan  Obtained with abnormalities on CT- Pulmonary consulted. She had since not had any further hemoptysis until 3 days back when she coughed up blood tinged mucus. She reports having a need to cough and clear her throat -usually in am. Denies any chronic cough. Denies any wheezing. Never diagnosed with asthma. She has seasonal increased nasal congestion, drainage  And postnasal drip- takes OTC Zrytec. Denies any fever, chills, weight loss. C/o chest pain- after eating certain foods. She has never smoked. No work in any industrial setting. She grew up in [address] [place], [place] [address]y 1 and moved to [place] as an adult.  No travel to [place] [address].  She has a dog at home. No h/o childhood respiratory illness- whooping cough. No known TB exposure. Allergies   Allergen Reactions    Percocet [Oxycodone-Acetaminophen] Nausea Only     Current Outpatient Medications   Medication Sig Dispense Refill    BREO ELLIPTA 100-25 mcg/dose inhaler INHALE 1 PUFF BY MOUTH ONCE DAILY 3 Inhaler 3    azithromycin (ZITHROMAX) 250 mg tablet TAKE 1 TABLET BY MOUTH ONCE DAILY 30 Tab 12    VENTOLIN HFA 90 mcg/actuation inhaler INHALE TWO PUFFS BY MOUTH EVERY 4 HOURS AS NEEDED 1 Inhaler 6    fluticasone (FLONASE ALLERGY RELIEF) 50 mcg/actuation nasal spray 2 Sprays by Both Nostrils route daily.  MULTIVITAMIN PO Take 1 Tab by mouth daily.  cholecalciferol, vitamin d3, (VITAMIN D) 1,000 unit tablet Take 1,000 Units by mouth daily.        Review of Systems:  HEENT: No epistaxis, no nasal drainage, no difficulty in swallowing, no redness in eyes  Respiratory: as above  Cardiovascular: no chest pain, no palpitations, no chronic leg edema, no syncope  Gastrointestinal: no abd pain, no vomiting, no diarrhea, no bleeding symptoms  Genitourinary: No urinary symptoms or hematuria  Integument/breast: No ulcers or rashes  Musculoskeletal:Neg  Neurological: No focal weakness, no seizures, no headaches  Behvioral/Psych: No anxiety, no depression  Constitutional: No fever, no chills, no weight loss, no night sweats     Objective:     Visit Vitals  /67 (BP 1 Location: Right arm, BP Patient Position: Sitting)   Pulse 64   Temp 98.4 °F (36.9 °C) (Oral)   Resp 18   Ht 5' 7\" (1.702 m)   Wt 61.3 kg (135 lb 3.2 oz)   LMP 10/20/2011   SpO2 97%   BMI 21.18 kg/m²        Physical Exam:   General: comfortable, no acute distress  HEENT: pupils reactive, sclera anicteric, EOM intact  Neck: No adenopathy or thyroid swelling, no lymphadenopathy or JVD, supple  CVS: S1S2 no murmurs  RS: Mod AE bilaterally , no wheezing no tactile fremitus or egophony, no accessory muscle use  Abd: soft, non tender, no hepatosplenomegaly  Neuro: non focal, awake, alert  Extrm: no leg edema, clubbing or cyanosis  Skin: no rash    Data review:    IgE- WNL  Allergy profile-    D. pteronyssinus <0.10  Class 0 kU/L Final     D. farinae Mite <0.10  Class 0 kU/L Final     Cat Hair/Dander <0.10  Class 0 kU/L Final     Dog Hair/Dander <0.10  Class 0 kU/L Final     Bermuda Grass 4.18 (A) Class IV kU/L Final     Chapin grass 4.19 (A) Class IV kU/L Final     Umair Grass 2.79 (A) Class III kU/L Final     Bahia Grass 6.48 (A) Class IV kU/L Final     [id number] COCKROACH, AMERICAN <0.10  Class 0 kU/L Final     Penicillium notatum <0.10  Class 0 kU/L Final     Cladosporium herbarum <0.10  Class 0 kU/L Final     Aspergillus fumigatus <0.10  Class 0 kU/L Final     Mucor racemosus <0.10  Class 0 kU/L Final     Alternaria tenuis <0.10  Class 0 kU/L Final     Stemphylium botryosum <0.10  Class 0 kU/L Final     [id number] COMMON SILVER BIRCH 0.37 (A) Class I kU/L Final     [place] 1.67 (A) Class III kU/L Final     American White Elm 0.30 (A) Class 0/I kU/L Final     Maple/Box Elder 0.38 (A) Class I kU/L Final     White Hickory 0.70 (A) Class II kU/L Final     [id number] MAPLE LEAF [place]CAMORE 0.20 (A) Class 0/I kU/L Final     White [place] <0.10  Class 0 kU/L Final     Sweet Gum 0.25 (A) Class 0/I kU/L Final     Mountain [place] 0.23 (A) Class 0/I kU/L Final     Ragweed, Short/Common 0.34 (A) Class I kU/L Final     Mugwort 0.19 (A) Class 0/I kU/L Final     Plantain, English 0.23 (A) Class 0/I kU/L Final     Pigweed, Rough 0.26 (A) Class 0/I kU/L Final     Sheep Sorrel (Dock) 0.31 (A) Class 0/I kU/L Final     Nettle 0.14 (A) Class 0/I kU/L Final       Narrative      4/27/2018 11:37 AM - [facility], [facility] Lab Results In       Component Results      Component     AFB SPECIMEN PROCESSING     Concentration     ACID FAST SMEAR     Negative     ACID FAST CULTURE (Abnormal)     Positive   Acid-fast bacilli have been detected in culture at 2 weeks; see AFB        Comment:       Imaging:  I have personally reviewed the patients radiographs and have reviewed the reports and images:  CXR Results  (Last 48 hours)    None        CT scan of chest ( [facility]) reviewed images- biapical pleural thickening, apical parenchymal scarring and RML bronchiectatic changes with scattered infiltrates, RUL bronchiectatic changes     IMPRESSION:   · Bronchiectasis- recent recurring hemoptysis- ? Superinfection with atypical mycobacteria. Cultures isolated Zac Jayla so initiated treatment but patient developed significant systemic symptoms of fatigue, malaise, anorexia, flu like aches and fevers/chills with > 5 fold increase in LFT's - likely all rifampin related so instructed to stop and has since recovered. ·  Lower respiratory tract infection:Predominant location in RML with some RUL distribution and biapical pleuroparenchymal infiltrates/fibrosis suggests a sequelae of remote inflammatory/granulomatous process. · CT scan (2016) with some scattered areas of infiltrates-Secondary to above  · Hemoptysis secondary to above  · Reactive airways disease- allergic vs secondary to above. 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 Repeat 10/2017- normal range. RECOMMENDATIONS:     · Continue Azithromycin- will change to 3 times a week for chronic suppressive treatment  · Will check HRCT and sputum for cultures and AFB periodically as indicted  · Continue  Breo-ellipta , prn albuterol  · Instructed to humidify ambient atmosphere- cool mist humidifier  · Discussed strict allergen avoidance- grasses, trees.   · Encouraged use of mask if outdoors in spring/fall  · Strict GERD precautions and add PPI  · Discussed at length diagnosis and treatment plan- written instructions given. · Will consider allergen therapy if unable to control with current  interventions  · Discussed new lab results- specifically IgG subclass ( now normal)  · Discussed vigilance and checking out for infectious /allergic triggers in work space  · Preventive vaccinations- Pneumovax PPSV23 up to date  ·  Influenza vaccine- recieved  · Will follow up        Health maintenance screens deferred to Primary care provider.      Carito Mitchell MD

## 2020-04-03 ENCOUNTER — APPOINTMENT (OUTPATIENT)
Dept: CT IMAGING | Age: 55
DRG: 191 | End: 2020-04-03
Attending: EMERGENCY MEDICINE
Payer: COMMERCIAL

## 2020-04-03 ENCOUNTER — TELEPHONE (OUTPATIENT)
Dept: PULMONOLOGY | Age: 55
End: 2020-04-03

## 2020-04-03 ENCOUNTER — APPOINTMENT (OUTPATIENT)
Dept: GENERAL RADIOLOGY | Age: 55
DRG: 191 | End: 2020-04-03
Attending: EMERGENCY MEDICINE
Payer: COMMERCIAL

## 2020-04-03 ENCOUNTER — HOSPITAL ENCOUNTER (INPATIENT)
Age: 55
LOS: 2 days | Discharge: HOME | DRG: 191 | End: 2020-04-05
Attending: EMERGENCY MEDICINE | Admitting: EMERGENCY MEDICINE
Payer: COMMERCIAL

## 2020-04-03 DIAGNOSIS — R04.2 HEMOPTYSIS: Primary | ICD-10-CM

## 2020-04-03 DIAGNOSIS — J18.9 PNEUMONIA OF RIGHT MIDDLE LOBE DUE TO INFECTIOUS ORGANISM: ICD-10-CM

## 2020-04-03 LAB
ANION GAP SERPL CALC-SCNC: 4 MMOL/L (ref 3–18)
BASOPHILS # BLD: 0 K/UL (ref 0–0.1)
BASOPHILS NFR BLD: 1 % (ref 0–2)
BUN SERPL-MCNC: 11 MG/DL (ref 7–18)
BUN/CREAT SERPL: 17 (ref 12–20)
CALCIUM SERPL-MCNC: 9.3 MG/DL (ref 8.5–10.1)
CHLORIDE SERPL-SCNC: 111 MMOL/L (ref 100–111)
CO2 SERPL-SCNC: 30 MMOL/L (ref 21–32)
CREAT SERPL-MCNC: 0.66 MG/DL (ref 0.6–1.3)
DIFFERENTIAL METHOD BLD: ABNORMAL
EOSINOPHIL # BLD: 0.1 K/UL (ref 0–0.4)
EOSINOPHIL NFR BLD: 1 % (ref 0–5)
ERYTHROCYTE [DISTWIDTH] IN BLOOD BY AUTOMATED COUNT: 13.9 % (ref 11.6–14.5)
GLUCOSE SERPL-MCNC: 95 MG/DL (ref 74–99)
HCT VFR BLD AUTO: 40.6 % (ref 35–45)
HGB BLD-MCNC: 13 G/DL (ref 12–16)
LYMPHOCYTES # BLD: 1.2 K/UL (ref 0.9–3.6)
LYMPHOCYTES NFR BLD: 27 % (ref 21–52)
MCH RBC QN AUTO: 27.4 PG (ref 24–34)
MCHC RBC AUTO-ENTMCNC: 32 G/DL (ref 31–37)
MCV RBC AUTO: 85.7 FL (ref 74–97)
MONOCYTES # BLD: 0.3 K/UL (ref 0.05–1.2)
MONOCYTES NFR BLD: 8 % (ref 3–10)
NEUTS SEG # BLD: 2.7 K/UL (ref 1.8–8)
NEUTS SEG NFR BLD: 63 % (ref 40–73)
PLATELET # BLD AUTO: 203 K/UL (ref 135–420)
PMV BLD AUTO: 10.9 FL (ref 9.2–11.8)
POTASSIUM SERPL-SCNC: 4.1 MMOL/L (ref 3.5–5.5)
RBC # BLD AUTO: 4.74 M/UL (ref 4.2–5.3)
SODIUM SERPL-SCNC: 145 MMOL/L (ref 136–145)
WBC # BLD AUTO: 4.2 K/UL (ref 4.6–13.2)

## 2020-04-03 PROCEDURE — 80048 BASIC METABOLIC PNL TOTAL CA: CPT

## 2020-04-03 PROCEDURE — 99284 EMERGENCY DEPT VISIT MOD MDM: CPT

## 2020-04-03 PROCEDURE — 36415 COLL VENOUS BLD VENIPUNCTURE: CPT

## 2020-04-03 PROCEDURE — 71250 CT THORAX DX C-: CPT

## 2020-04-03 PROCEDURE — 74011250637 HC RX REV CODE- 250/637: Performed by: EMERGENCY MEDICINE

## 2020-04-03 PROCEDURE — 74011000250 HC RX REV CODE- 250: Performed by: EMERGENCY MEDICINE

## 2020-04-03 PROCEDURE — 71046 X-RAY EXAM CHEST 2 VIEWS: CPT

## 2020-04-03 PROCEDURE — 94640 AIRWAY INHALATION TREATMENT: CPT

## 2020-04-03 PROCEDURE — 87040 BLOOD CULTURE FOR BACTERIA: CPT

## 2020-04-03 PROCEDURE — 74011250636 HC RX REV CODE- 250/636: Performed by: EMERGENCY MEDICINE

## 2020-04-03 PROCEDURE — 65660000000 HC RM CCU STEPDOWN

## 2020-04-03 PROCEDURE — 85025 COMPLETE CBC W/AUTO DIFF WBC: CPT

## 2020-04-03 RX ORDER — IPRATROPIUM BROMIDE AND ALBUTEROL SULFATE 2.5; .5 MG/3ML; MG/3ML
3 SOLUTION RESPIRATORY (INHALATION)
Status: DISCONTINUED | OUTPATIENT
Start: 2020-04-03 | End: 2020-04-04

## 2020-04-03 RX ORDER — CEFTRIAXONE 1 G/1
1 INJECTION, POWDER, FOR SOLUTION INTRAMUSCULAR; INTRAVENOUS EVERY 24 HOURS
Status: DISCONTINUED | OUTPATIENT
Start: 2020-04-04 | End: 2020-04-03

## 2020-04-03 RX ORDER — ALBUTEROL SULFATE 0.83 MG/ML
2.5 SOLUTION RESPIRATORY (INHALATION)
Status: DISCONTINUED | OUTPATIENT
Start: 2020-04-03 | End: 2020-04-05 | Stop reason: HOSPADM

## 2020-04-03 RX ORDER — SODIUM CHLORIDE 9 MG/ML
75 INJECTION, SOLUTION INTRAVENOUS CONTINUOUS
Status: DISPENSED | OUTPATIENT
Start: 2020-04-03 | End: 2020-04-04

## 2020-04-03 RX ORDER — ASCORBIC ACID 500 MG
500 TABLET ORAL
COMMUNITY

## 2020-04-03 RX ORDER — THERA TABS 400 MCG
1 TAB ORAL DAILY
Status: DISCONTINUED | OUTPATIENT
Start: 2020-04-04 | End: 2020-04-05 | Stop reason: HOSPADM

## 2020-04-03 RX ORDER — MELATONIN
1000 DAILY
Status: DISCONTINUED | OUTPATIENT
Start: 2020-04-04 | End: 2020-04-05 | Stop reason: HOSPADM

## 2020-04-03 RX ORDER — DOCUSATE SODIUM 100 MG/1
100 CAPSULE, LIQUID FILLED ORAL 2 TIMES DAILY
Status: DISCONTINUED | OUTPATIENT
Start: 2020-04-03 | End: 2020-04-05 | Stop reason: HOSPADM

## 2020-04-03 RX ORDER — NALOXONE HYDROCHLORIDE 0.4 MG/ML
0.4 INJECTION, SOLUTION INTRAMUSCULAR; INTRAVENOUS; SUBCUTANEOUS AS NEEDED
Status: DISCONTINUED | OUTPATIENT
Start: 2020-04-03 | End: 2020-04-05 | Stop reason: HOSPADM

## 2020-04-03 RX ORDER — ACETAMINOPHEN 325 MG/1
650 TABLET ORAL
Status: DISCONTINUED | OUTPATIENT
Start: 2020-04-03 | End: 2020-04-05 | Stop reason: HOSPADM

## 2020-04-03 RX ORDER — ASCORBIC ACID 250 MG
500 TABLET ORAL DAILY
Status: DISCONTINUED | OUTPATIENT
Start: 2020-04-04 | End: 2020-04-05 | Stop reason: HOSPADM

## 2020-04-03 RX ORDER — CEFTRIAXONE 1 G/1
1 INJECTION, POWDER, FOR SOLUTION INTRAMUSCULAR; INTRAVENOUS
Status: COMPLETED | OUTPATIENT
Start: 2020-04-03 | End: 2020-04-03

## 2020-04-03 RX ADMIN — ARFORMOTEROL TARTRATE: 15 SOLUTION RESPIRATORY (INHALATION) at 21:03

## 2020-04-03 RX ADMIN — IPRATROPIUM BROMIDE AND ALBUTEROL SULFATE 3 ML: .5; 3 SOLUTION RESPIRATORY (INHALATION) at 21:03

## 2020-04-03 RX ADMIN — SODIUM CHLORIDE 75 ML/HR: 900 INJECTION, SOLUTION INTRAVENOUS at 20:05

## 2020-04-03 RX ADMIN — DOCUSATE SODIUM 100 MG: 100 CAPSULE, LIQUID FILLED ORAL at 20:05

## 2020-04-03 RX ADMIN — AZITHROMYCIN MONOHYDRATE 500 MG: 500 INJECTION, POWDER, LYOPHILIZED, FOR SOLUTION INTRAVENOUS at 14:21

## 2020-04-03 RX ADMIN — CEFTRIAXONE SODIUM 1 G: 1 INJECTION, POWDER, FOR SOLUTION INTRAMUSCULAR; INTRAVENOUS at 14:21

## 2020-04-03 NOTE — ROUTINE PROCESS
TRANSFER - IN REPORT: 
 
Verbal report received from Wes TURNER Rn(name) on [address]  being received from [facility] ED(unit) for routine progression of care Report consisted of patients Situation, Background, Assessment and  
Recommendations(SBAR). Information from the following report(s) SBAR, Kardex and Recent Results was reviewed with the receiving nurse. Opportunity for questions and clarification was provided. Assessment completed upon patients arrival to unit and care assumed.

## 2020-04-03 NOTE — ED PROVIDER NOTES
HPI patient presents to the emergency room today complaining of several episodes of hemoptysis over the last 12 to 18 hours. She has a past history of tuberculosis and was treated for this 3 years ago. She is followed routinely by pulmonology for routine monitoring. She says that last night she had an episode of hemoptysis and then had an episode again this morning. She called her pulmonologist and was referred to the emergency room for further evaluation. She denies any persistent coughing or shortness of breath. She denies any fever. She says other than these episodes of mopped assist she has been feeling well. No other complaints given at this time.     Past Medical History:   Diagnosis Date    Allergic rhinitis     CTS (carpal tunnel syndrome)     Gastritis     Mild intermittent asthma without complication 91/6/6830    Trochanteric bursitis        Past Surgical History:   Procedure Laterality Date    HX CARPAL TUNNEL RELEASE      HX HYSTERECTOMY           Family History:   Problem Relation Age of Onset    Asthma Mother     Cancer Mother     Liver Disease Mother     Asthma Sister     Cancer Maternal Aunt 36        colon ca    Cancer Maternal Grandmother         lung ca smoker    Cancer Maternal Grandfather         lung ca smokers    Diabetes Other     Cancer Maternal Aunt 61        breast    Breast Cancer Maternal Aunt     Colon Cancer Father        Social History     Socioeconomic History    Marital status:      Spouse name: Not on file    Number of children: Not on file    Years of education: Not on file    Highest education level: Not on file   Occupational History    Not on file   Social Needs    Financial resource strain: Not on file    Food insecurity     Worry: Not on file     Inability: Not on file    Transportation needs     Medical: Not on file     Non-medical: Not on file   Tobacco Use    Smoking status: Never Smoker    Smokeless tobacco: Never Used Substance and Sexual Activity    Alcohol use: Yes    Drug use: No    Sexual activity: Yes     Partners: Male     Birth control/protection: Rhythm   Lifestyle    Physical activity     Days per week: Not on file     Minutes per session: Not on file    Stress: Not on file   Relationships    Social connections     Talks on phone: Not on file     Gets together: Not on file     Attends Mandaen service: Not on file     Active member of club or organization: Not on file     Attends meetings of clubs or organizations: Not on file     Relationship status: Not on file    Intimate partner violence     Fear of current or ex partner: Not on file     Emotionally abused: Not on file     Physically abused: Not on file     Forced sexual activity: Not on file   Other Topics Concern    Not on file   Social History Narrative    Not on file         ALLERGIES: Percocet [oxycodone-acetaminophen]    Review of Systems   Constitutional: Negative. HENT: Negative. Eyes: Negative. Respiratory: Positive for cough. Cardiovascular: Negative. Gastrointestinal: Negative. Genitourinary: Negative. Musculoskeletal: Negative. Skin: Negative. Neurological: Negative. Psychiatric/Behavioral: Negative. Vitals:    04/03/20 0940   BP: 130/82   Pulse: 75   Resp: 18   Temp: 98.4 °F (36.9 °C)   SpO2: 100%   Weight: 58.1 kg (128 lb)   Height: 5' 6\" (1.676 m)            Physical Exam  Vitals signs and nursing note reviewed. Constitutional:       Appearance: She is well-developed. HENT:      Head: Normocephalic and atraumatic. Eyes:      Conjunctiva/sclera: Conjunctivae normal.      Pupils: Pupils are equal, round, and reactive to light. Neck:      Musculoskeletal: Normal range of motion and neck supple. Cardiovascular:      Rate and Rhythm: Normal rate and regular rhythm. Pulmonary:      Effort: Pulmonary effort is normal.      Breath sounds: Normal breath sounds.    Abdominal:      Palpations: Abdomen is soft.   Musculoskeletal: Normal range of motion. Skin:     General: Skin is warm and dry. Neurological:      Mental Status: She is alert and oriented to person, place, and time. MDM         Procedures      I reviewed the ER evaluation results and CAT scan results with patient's pulmonologist (Dr Altagracia Richard). Pulmonology has recommended hospital admission for further quantification of her hemoptysis and pneumonia. I discussed this with the patient and she understands and agrees with this plan as well.   Prasanna Fernandez MD 1:18 PM

## 2020-04-03 NOTE — TELEPHONE ENCOUNTER
PT [id number]). SHE HAS BEEN COUGHING UP BLOOD FOR THREE DAYS.   PLEASE NOTIFY NURSE OR DR Bharti Isaac

## 2020-04-03 NOTE — PROGRESS NOTES
Discussed case with Dr. Shar Pelayo, 48-year-old female presents to the emergency room secondary to hemoptysis. She is well-known to Dr. Neeraj Fall pulmonology as well as Dr. Edna Vora. Pulmonary consulted by ER physician regarding hemoptysis, patient has a known past medical history of Mycobacterium kansasii infection and pulmonary feels that this could be a reactivation of the infection. I spoke with infection control and the patient will need a negative pressure room. We will see the patient once she arrives at [facility].

## 2020-04-03 NOTE — ROUTINE PROCESS
TRANSFER - OUT REPORT: 
 
Verbal report given to Kaycee Puente RN(name) on Figueroa Marie  being transferred to  [facility], Room 401(unit) for routine progression of care Report consisted of patients Situation, Background, Assessment and  
Recommendations(SBAR). Information from the following report(s) SBAR, Kardex, ED Summary and MAR was reviewed with the receiving nurse. Lines:  
Peripheral IV 04/03/20 Right Antecubital (Active) Site Assessment Clean, dry, & intact 4/3/2020 10:19 AM  
Dressing Status Clean, dry, & intact 4/3/2020 10:19 AM  
Dressing Type Transparent 4/3/2020 10:19 AM  
Hub Color/Line Status Flushed 4/3/2020 10:19 AM  
  
 
Opportunity for questions and clarification was provided. Patient transported with: INT

## 2020-04-03 NOTE — H&P
Hospitalist Admission History and Physical    NAME:  Angella Jolly   :   1965   MRN:   [id number]     PCP:  Blair Harman MD  Date/Time:  4/3/2020 6:18 PM  Subjective:   CHIEF COMPLAINT: Hemoptysis    HISTORY OF PRESENT ILLNESS:     This is a 54-year-old female with a known past medical history of bronchiectasis and a history of Mycobacterium kansasii infection who presented to the ED with complaints of hemoptysis. Patient states that the first episode of hemoptysis happened yesterday morning and then she had another episode yesterday night and then another one this morning which prompted her to come to the ED. Patient states that in these 3 episodes she might have coughed out close to blood. No history of any fever or chills. No history of any nausea vomiting. No history of any recent travel. No history of any sick contacts. No history of any loss of taste or smell. No history of any chest pain or shortness of breath. No history of any headaches numbness or focal weakness. No history of any abdominal pain urinary complaints diarrhea or rectal bleeding. No history of any leg swelling or rash.         Past Medical History:   Diagnosis Date    Allergic rhinitis     CTS (carpal tunnel syndrome)     Gastritis     Mild intermittent asthma without complication     Trochanteric bursitis         Past Surgical History:   Procedure Laterality Date    HX CARPAL TUNNEL RELEASE      HX HYSTERECTOMY         Social History     Tobacco Use    Smoking status: Never Smoker    Smokeless tobacco: Never Used   Substance Use Topics    Alcohol use: Yes        Family History   Problem Relation Age of Onset   [facility] Asthma Mother     Cancer Mother     Liver Disease Mother     Asthma Sister     Cancer Maternal Aunt 36        colon ca    Cancer Maternal Grandmother         lung ca smoker    Cancer Maternal Grandfather         lung ca smokers    Diabetes Other     Cancer Maternal Aunt 61 breast    Breast Cancer Maternal Aunt     Colon Cancer Father         Allergies   Allergen Reactions    Percocet [Oxycodone-Acetaminophen] Nausea Only        Prior to Admission Medications   Prescriptions Last Dose Informant Patient Reported? Taking? BREO ELLIPTA 100-25 mcg/dose inhaler   No Yes   Sig: INHALE 1 PUFF BY MOUTH ONCE DAILY   MULTIVITAMIN PO   Yes Yes   Sig: Take 1 Tab by mouth daily. TURMERIC, BULK,   Yes Yes   Sig: by Does Not Apply route. VENTOLIN HFA 90 mcg/actuation inhaler   No Yes   Sig: INHALE TWO PUFFS BY MOUTH EVERY 4 HOURS AS NEEDED   ascorbic acid, vitamin C, (Vitamin C) 500 mg tablet   Yes Yes   Sig: Take  by mouth. azithromycin (ZITHROMAX) 250 mg tablet   No Yes   Sig: TAKE 1 TABLET BY MOUTH ONCE DAILY   cholecalciferol, vitamin d3, (VITAMIN D) 1,000 unit tablet   Yes Yes   Sig: Take 1,000 Units by mouth daily. fluticasone (FLONASE ALLERGY RELIEF) 50 mcg/actuation nasal spray   Yes Yes   Si Sprays by Both Nostrils route daily. Facility-Administered Medications: None       REVIEW OF SYSTEMS:    Review of Systems  GENERAL: Patient alert, awake and oriented times 3, able to communicate full sentences and not in distress. HEENT: No change in vision, no earache, tinnitus, sore throat or sinus congestion. NECK: No pain or stiffness. PULMONARY: No shortness of breath, + cough . + hemoptysis  Cardiovascular: no pnd or orthopnea, no CP  GASTROINTESTINAL: No abdominal pain, nausea, vomiting or diarrhea, melena or bright red blood per rectum. GENITOURINARY: No urinary frequency, urgency, hesitancy or dysuria. MUSCULOSKELETAL: No back pain, no leg pain  DERMATOLOGIC: No rash, no itching, no lesions. ENDOCRINE: No polyuria, polydipsia, no heat or cold intolerance. No recent change in weight. HEMATOLOGICAL: No anemia or easy bruising or bleeding. NEUROLOGIC: No headache, seizures, numbness, tingling or weakness.        Objective:   VITALS:    Visit Vitals  /75 (BP 1 Location: Left arm, BP Patient Position: Sitting)   Pulse 71   Temp 98.1 °F (36.7 °C)   Resp 19   Ht 5' 6\" (1.676 m)   Wt 58.1 kg (128 lb)   LMP 10/20/2011   SpO2 98%   BMI 20.66 kg/m²     Temp (24hrs), Av.4 °F (36.9 °C), Min:98.1 °F (36.7 °C), Max:98.6 °F (37 °C)      PHYSICAL EXAM:   General:    Lying in bed in no acute distress. HEENT:  Pupils equal.  Sclera anicteric. Conjunctiva pink. Mucous membranes                           Moist, no ear or nasal discharge  Neck:  Supple. Trachea midline. No accessory muscle use. No thyromegaly. No jugular venous distention, no carotid bruit  CV:                  Regular rate and rhythm. S1S2+  Lungs:   Coarse breath sounds bilaterally. No Wheezing or Rhonchi. No rales. Abdomen:   Soft, non-tender. Not distended. Bowel sounds normal.   Extremities: No cyanosis. No edema. Pulses 1+ b/l  Neurologic: Alert and oriented X 3. Follows commands, responds appropriately. No focal neurological deficit was noted  Skin:                Warm and dry. No rashes. LAB DATA REVIEWED:    No components found for: [place]  Recent Labs     20  1016      K 4.1      CO2 30   BUN 11   CREA 0.66   GLU 95   CA 9.3   WBC 4.2*   HGB 13.0   HCT 40.6            CBC WITH AUTOMATED DIFF    Collection Time: 20 10:16 AM   Result Value Ref Range    WBC 4.2 (L) 4.6 - 13.2 K/uL    RBC 4.74 4.20 - 5.30 M/uL    HGB 13.0 12.0 - 16.0 g/dL    HCT 40.6 35.0 - 45.0 %    MCV 85.7 74.0 - 97.0 FL    MCH 27.4 24.0 - 34.0 PG    MCHC 32.0 31.0 - 37.0 g/dL    RDW 13.9 11.6 - 14.5 %    PLATELET 372 035 - 091 K/uL    MPV 10.9 9.2 - 11.8 FL    NEUTROPHILS 63 40 - 73 %    LYMPHOCYTES 27 21 - 52 %    MONOCYTES 8 3 - 10 %    EOSINOPHILS 1 0 - 5 %    BASOPHILS 1 0 - 2 %    ABS. NEUTROPHILS 2.7 1.8 - 8.0 K/UL    ABS. LYMPHOCYTES 1.2 0.9 - 3.6 K/UL    ABS. MONOCYTES 0.3 0.05 - 1.2 K/UL    ABS. EOSINOPHILS 0.1 0.0 - 0.4 K/UL    ABS.  BASOPHILS 0.0 0.0 - 0.1 K/UL    DF AUTOMATED           CT Results  (Last 48 hours)               04/03/20 1159  CT CHEST WO CONT Final result    Impression:  Impression:       Patchy groundglass in the right middle lobe and in a small portion of the   adjacent right upper lobe. Findings likely represent multifocal infection. No   dense consolidation. 3 month follow-up chest CT is advised to ensure resolution. Nodular pleural parenchymal scarring in the lung apices is seen. The largest   nodular region measures 10 x 9 mm. Recommend attention on 3 month follow-up to   ensure stability as early lung neoplasm is not entirely excluded but considered   less likely. Focal region of volume loss in the medial aspect of the right middle lobe with   associated bronchiectasis. This is likely a chronic process. Recommend attention   on follow-up. Narrative:  CT chest without enhancement        INDICATION: Right upper lobe nodule on radiograph. TECHNIQUE: 5 mm collimation axial images obtained from the thoracic inlet to the   level of the diaphragm without administration of low osmolar, nonionic   intravenous contrast.       Dose reduction techniques used: Automated exposure control, adjustment of the   mAs and/or kVp according to patient size, standardized low-dose protocol, and/or   iterative reconstruction technique. COMPARISON: Same day radiograph. CHEST FINDINGS:        Lymph nodes: No adenopathy by CT size criteria. Thyroid: Unremarkable. Mediastinum: Heart size is normal. No pericardial effusion. Minimal   atherosclerotic disease. Esophagus is underdistended. Lungs: There is extensive pleural parenchymal scarring in the lung apices, right   greater than left. There is some nodular right apical presumed scarring with a 9   x 10 mm nodule on axial image 8. There are smaller areas of nodularity as well. There is a small amount of groundglass in the right middle lobe.  There is volume   loss and bronchiectasis in the medial aspect of the right middle lobe. Small   amount of groundglass in the inferior right upper lobe as well. No pleural   effusion. ABDOMEN:        No acute finding. Bones: Degenerative changes of the spine. Assessment/Plan:      Active Problems:    Hemoptysis (12/6/2016)      Pneumonia (4/3/2020)    History of Mycobacterium kansasii infection  Bronchiectasis without acute exacerbation  ___________________________________________________  PLAN:      Patient will be admitted to a telemetry bed. I have discussed with pulmonary and we will continue patient on ceftriaxone and Zithromax. Sputum cultures have been requested. Blood cultures have been requested. We will also do sputum AFB smears x3. I have discussed this with the nurse with the recommendation to do these every 8 hours. We will continue other preadmission medications. We will continue airborne isolation for now till patient is evaluated by pulmonary. Bronchial hygiene will be continued. Rest depending on patient's further hospital course. Plan of care was discussed with the patient and she verbalized understanding and agreed. I also discussed the level of care and patient wishes to be a full code.       Prophylaxis:  []Lovenox  []Coumadin  []Hep SQ  [x]SCDs  []H2B/PPI    Discussed Code Status:    [x]Full Code      []DNR     ___________________________________________________    Care Plan discussed with:    [x]Patient   []Family    []ED Care Manager  []ED Doc   [x]Specialist : Pulmonologist    Total Time Coordinating Admission: 60 minutes    []Total Critical Care Time:     ___________________________________________________  Admitting Physician: Westley Bonner MD

## 2020-04-03 NOTE — ED TRIAGE NOTES
Pt co hemoptysis since 0530 yest  With coughing states coughs to clear throat, pt states has pulmonary hx

## 2020-04-03 NOTE — TELEPHONE ENCOUNTER
Per pt she started yesterday with coughing up bright red blood. Again last night and today it happened again and seems to be getting significantly worse. Pt agrees to go to ER now.  Pt leaving to go now

## 2020-04-04 LAB
ALBUMIN SERPL-MCNC: 3.3 G/DL (ref 3.4–5)
ALBUMIN/GLOB SERPL: 1.1 {RATIO} (ref 0.8–1.7)
ALP SERPL-CCNC: 52 U/L (ref 45–117)
ALT SERPL-CCNC: 20 U/L (ref 13–56)
ANION GAP SERPL CALC-SCNC: 5 MMOL/L (ref 3–18)
AST SERPL-CCNC: 13 U/L (ref 10–38)
BASOPHILS # BLD: 0 K/UL (ref 0–0.1)
BASOPHILS NFR BLD: 1 % (ref 0–2)
BILIRUB SERPL-MCNC: 0.6 MG/DL (ref 0.2–1)
BUN SERPL-MCNC: 12 MG/DL (ref 7–18)
BUN/CREAT SERPL: 20 (ref 12–20)
CALCIUM SERPL-MCNC: 8.4 MG/DL (ref 8.5–10.1)
CHLORIDE SERPL-SCNC: 111 MMOL/L (ref 100–111)
CO2 SERPL-SCNC: 26 MMOL/L (ref 21–32)
CREAT SERPL-MCNC: 0.6 MG/DL (ref 0.6–1.3)
DIFFERENTIAL METHOD BLD: ABNORMAL
EOSINOPHIL # BLD: 0.1 K/UL (ref 0–0.4)
EOSINOPHIL NFR BLD: 2 % (ref 0–5)
ERYTHROCYTE [DISTWIDTH] IN BLOOD BY AUTOMATED COUNT: 14.2 % (ref 11.6–14.5)
GLOBULIN SER CALC-MCNC: 3 G/DL (ref 2–4)
GLUCOSE SERPL-MCNC: 92 MG/DL (ref 74–99)
HCT VFR BLD AUTO: 36.4 % (ref 35–45)
HGB BLD-MCNC: 11.3 G/DL (ref 12–16)
INR PPP: 1.1 (ref 0.8–1.2)
LYMPHOCYTES # BLD: 1.4 K/UL (ref 0.9–3.6)
LYMPHOCYTES NFR BLD: 36 % (ref 21–52)
MAGNESIUM SERPL-MCNC: 2.1 MG/DL (ref 1.6–2.6)
MCH RBC QN AUTO: 26.9 PG (ref 24–34)
MCHC RBC AUTO-ENTMCNC: 31 G/DL (ref 31–37)
MCV RBC AUTO: 86.7 FL (ref 74–97)
MONOCYTES # BLD: 0.3 K/UL (ref 0.05–1.2)
MONOCYTES NFR BLD: 8 % (ref 3–10)
NEUTS SEG # BLD: 2.1 K/UL (ref 1.8–8)
NEUTS SEG NFR BLD: 53 % (ref 40–73)
PLATELET # BLD AUTO: 191 K/UL (ref 135–420)
PMV BLD AUTO: 11.2 FL (ref 9.2–11.8)
POTASSIUM SERPL-SCNC: 3.6 MMOL/L (ref 3.5–5.5)
PROCALCITONIN SERPL-MCNC: <0.05 NG/ML
PROT SERPL-MCNC: 6.3 G/DL (ref 6.4–8.2)
PROTHROMBIN TIME: 13.6 SEC (ref 11.5–15.2)
RBC # BLD AUTO: 4.2 M/UL (ref 4.2–5.3)
SODIUM SERPL-SCNC: 142 MMOL/L (ref 136–145)
WBC # BLD AUTO: 4 K/UL (ref 4.6–13.2)

## 2020-04-04 PROCEDURE — 87070 CULTURE OTHR SPECIMN AEROBIC: CPT

## 2020-04-04 PROCEDURE — 36415 COLL VENOUS BLD VENIPUNCTURE: CPT

## 2020-04-04 PROCEDURE — 87116 MYCOBACTERIA CULTURE: CPT

## 2020-04-04 PROCEDURE — 87186 SC STD MICRODIL/AGAR DIL: CPT

## 2020-04-04 PROCEDURE — 83735 ASSAY OF MAGNESIUM: CPT

## 2020-04-04 PROCEDURE — 74011250636 HC RX REV CODE- 250/636: Performed by: HOSPITALIST

## 2020-04-04 PROCEDURE — 80053 COMPREHEN METABOLIC PANEL: CPT

## 2020-04-04 PROCEDURE — 85025 COMPLETE CBC W/AUTO DIFF WBC: CPT

## 2020-04-04 PROCEDURE — 87449 NOS EACH ORGANISM AG IA: CPT

## 2020-04-04 PROCEDURE — 94640 AIRWAY INHALATION TREATMENT: CPT

## 2020-04-04 PROCEDURE — 74011000250 HC RX REV CODE- 250: Performed by: EMERGENCY MEDICINE

## 2020-04-04 PROCEDURE — 84145 PROCALCITONIN (PCT): CPT

## 2020-04-04 PROCEDURE — 87450 LEGIONELLA PNEUMOPHILA AG, URINE: CPT

## 2020-04-04 PROCEDURE — 74011000250 HC RX REV CODE- 250: Performed by: HOSPITALIST

## 2020-04-04 PROCEDURE — 74011250637 HC RX REV CODE- 250/637: Performed by: EMERGENCY MEDICINE

## 2020-04-04 PROCEDURE — 74011250636 HC RX REV CODE- 250/636: Performed by: EMERGENCY MEDICINE

## 2020-04-04 PROCEDURE — 65660000000 HC RM CCU STEPDOWN

## 2020-04-04 PROCEDURE — 87149 DNA/RNA DIRECT PROBE: CPT

## 2020-04-04 PROCEDURE — 85610 PROTHROMBIN TIME: CPT

## 2020-04-04 RX ORDER — IPRATROPIUM BROMIDE AND ALBUTEROL SULFATE 2.5; .5 MG/3ML; MG/3ML
3 SOLUTION RESPIRATORY (INHALATION)
Status: DISCONTINUED | OUTPATIENT
Start: 2020-04-04 | End: 2020-04-05 | Stop reason: HOSPADM

## 2020-04-04 RX ORDER — AZITHROMYCIN 250 MG/1
500 TABLET, FILM COATED ORAL DAILY
Status: DISCONTINUED | OUTPATIENT
Start: 2020-04-05 | End: 2020-04-05 | Stop reason: HOSPADM

## 2020-04-04 RX ORDER — PANTOPRAZOLE SODIUM 40 MG/1
40 TABLET, DELAYED RELEASE ORAL
Status: DISCONTINUED | OUTPATIENT
Start: 2020-04-05 | End: 2020-04-05 | Stop reason: HOSPADM

## 2020-04-04 RX ADMIN — ACETAMINOPHEN 650 MG: 325 TABLET ORAL at 09:06

## 2020-04-04 RX ADMIN — THERA TABS 1 TABLET: TAB at 09:06

## 2020-04-04 RX ADMIN — CEFTRIAXONE SODIUM 1 G: 1 INJECTION, POWDER, FOR SOLUTION INTRAMUSCULAR; INTRAVENOUS at 13:29

## 2020-04-04 RX ADMIN — IPRATROPIUM BROMIDE AND ALBUTEROL SULFATE 3 ML: .5; 3 SOLUTION RESPIRATORY (INHALATION) at 08:30

## 2020-04-04 RX ADMIN — AZITHROMYCIN MONOHYDRATE 500 MG: 500 INJECTION, POWDER, LYOPHILIZED, FOR SOLUTION INTRAVENOUS at 13:29

## 2020-04-04 RX ADMIN — Medication 500 MG: at 09:06

## 2020-04-04 RX ADMIN — SODIUM CHLORIDE 75 ML/HR: 900 INJECTION, SOLUTION INTRAVENOUS at 09:06

## 2020-04-04 RX ADMIN — MELATONIN 1 TABLET: at 09:06

## 2020-04-04 RX ADMIN — DOCUSATE SODIUM 100 MG: 100 CAPSULE, LIQUID FILLED ORAL at 08:56

## 2020-04-04 RX ADMIN — ARFORMOTEROL TARTRATE: 15 SOLUTION RESPIRATORY (INHALATION) at 20:24

## 2020-04-04 RX ADMIN — ARFORMOTEROL TARTRATE: 15 SOLUTION RESPIRATORY (INHALATION) at 08:29

## 2020-04-04 NOTE — CONSULTS
[facility]  Pulmonary, Critical Care, and Sleep Medicine    Initial Patient Consult    Name: Rusty Navarrete MRN: [id number]   : 1965 Hospital: [address] [place] [address]   Date: 2020        IMPRESSION:   · Hemoptysis likely due to below   · Bronchiectasis in exacerbation, possibly related to noncompliance with Azithromycin suppression therapy  · Nodular pleural parenchymal scarring probably due to M [place]ii infection  · History of chronic Mycobacterium kansasii infection  · Asthma, mild intermittent without exacerbation   · History of intolerance to Rifampin and Ethambutol with elevated LFT's      RECOMMENDATIONS:   · Continue empiric antibiotic therapy Ceftriaxone and Azithromycin  · Quantify hemoptysis, although frequency and amount seem to be improving   · Cough suppression prn. May use opiates if needed  · AFB sputum pending, if positive more likely to be non-tuberculous Mycobacterium  · No indication for supplemental O2  · Close CT follow up in 3 months  · No chemical DVT prophylaxis in light of hemoptysis  · Discussed possible bronchoscopy with pt who agreed on deferring unless bleeding persists  · Will follow with you. Thank you for consulting     Subjective: This patient has been seen and evaluated at the request of Dr. Federico Guzman for hemoptysis. Patient is a 53 y.o. female with bronchiectasis and history of non tuberculous mycobacterial infection. Pt has had prior episodes of hemoptysis due to above, last on was in early . Pt is on Azithromycin -- for suppression as she was unable to tolerate Rifampin and Ethambutol due to elevated LFT's. Pt admits to noncompliance x 2 weeks as she was working from home and lost track of days. She then had 1 episode of hemoptysis 2 days PTA consisting of bloody mucus. This was followed by 2 more episodes the next day prompting ED consult. Pt denies fever, chills, chest pain or SOB.  Chest CT was done, see below, and since pt had another episode of hemoptysis she was admitted for observation and treatment. Past Medical History:   Diagnosis Date    Allergic rhinitis     CTS (carpal tunnel syndrome)     Gastritis     Mild intermittent asthma without complication 40/3/8590    Trochanteric bursitis       Past Surgical History:   Procedure Laterality Date    HX CARPAL TUNNEL RELEASE      HX HYSTERECTOMY        Prior to Admission medications    Medication Sig Start Date End Date Taking? Authorizing Provider   TURMERIC, BULK, by Does Not Apply route. Yes Chayito, MD Sebastian   ascorbic acid, vitamin C, (Vitamin C) 500 mg tablet Take  by mouth. Yes Chayito, MD Sebastian   BREO ELLIPTA 100-25 mcg/dose inhaler INHALE 1 PUFF BY MOUTH ONCE DAILY 12/9/19  Yes Elizabeth Devine MD   azithromycin (ZITHROMAX) 250 mg tablet TAKE 1 TABLET BY MOUTH ONCE DAILY 5/7/19  Yes Elizabeth Devine MD   VENTOLIN HFA 90 mcg/actuation inhaler INHALE TWO PUFFS BY MOUTH EVERY 4 HOURS AS NEEDED 3/4/19  Yes Elizabeth Devine MD   fluticasone (FLONASE ALLERGY RELIEF) 50 mcg/actuation nasal spray 2 Sprays by Both Nostrils route daily. Yes Provider, Historical   MULTIVITAMIN PO Take 1 Tab by mouth daily. Yes Provider, Historical   cholecalciferol, vitamin d3, (VITAMIN D) 1,000 unit tablet Take 1,000 Units by mouth daily.    Yes Provider, Historical     Allergies   Allergen Reactions    Percocet [Oxycodone-Acetaminophen] Nausea Only      Social History     Tobacco Use    Smoking status: Never Smoker    Smokeless tobacco: Never Used   Substance Use Topics    Alcohol use: Yes      Family History   Problem Relation Age of Onset   [place] Asthma Mother     Cancer Mother     Liver Disease Mother     Asthma Sister     Cancer Maternal Aunt 36        colon ca    Cancer Maternal Grandmother         lung ca smoker    Cancer Maternal Grandfather         lung ca smokers    Diabetes Other     Cancer Maternal Aunt 60        breast    Breast Cancer Maternal Aunt     Colon Cancer Father         Current Facility-Administered Medications   Medication Dose Route Frequency    0.9% sodium chloride infusion  75 mL/hr IntraVENous CONTINUOUS    docusate sodium (COLACE) capsule 100 mg  100 mg Oral BID    albuterol-ipratropium (DUO-NEB) 2.5 MG-0.5 MG/3 ML  3 mL Nebulization Q6H RT    azithromycin (ZITHROMAX) 500 mg in 0.9% sodium chloride 250 mL IVPB  500 mg IntraVENous Q24H    cefTRIAXone (ROCEPHIN) 1 g in sterile water (preservative free) 10 mL IV syringe  1 g IntraVENous Q24H    arformoterol 15 mcg/budesonide 0.25 mg neb solution   Nebulization BID RT    therapeutic multivitamin (THERAGRAN) tablet 1 Tab  1 Tab Oral DAILY    ascorbic acid (vitamin C) (VITAMIN C) tablet 500 mg  500 mg Oral DAILY    cholecalciferol (VITAMIN D3) (1000 Units /25 mcg) tablet 1 Tab  1,000 Units Oral DAILY       Review of Systems:  Pertinent items are noted in HPI. Objective:   Vital Signs:    Visit Vitals  BP 96/59 (BP 1 Location: Left arm, BP Patient Position: At rest)   Pulse 72   Temp 98.2 °F (36.8 °C)   Resp 16   Ht 5' 6\" (1.676 m)   Wt 58.1 kg (128 lb)   LMP 10/20/2011   SpO2 99%   BMI 20.66 kg/m²       O2 Device: Room air       Temp (24hrs), Av.4 °F (36.9 °C), Min:98.1 °F (36.7 °C), Max:98.6 °F (37 °C)       Intake/Output:   Last shift:      No intake/output data recorded. Last 3 shifts:  1901 -  0700  In: 807.5 [I.V.:807.5]  Out: 300 [Urine:300]    Intake/Output Summary (Last 24 hours) at 2020 1237  Last data filed at 2020 6912  Gross per 24 hour   Intake 807.5 ml   Output 300 ml   Net 507.5 ml      Physical Exam:   General:  Alert, cooperative, no distress, appears stated age. Head:  Normocephalic, without obvious abnormality, atraumatic. Eyes:  Conjunctivae/corneas clear. PERRL, EOMs intact. Nose: Nares normal. Mucosa normal. No drainage or sinus tenderness.    Throat: Lips, mucosa, and tongue normal. Teeth and gums normal.   Neck: Supple, symmetrical, trachea midline, no adenopathy, thyroid: no enlargment/tenderness/nodules, no carotid bruit and no JVD. Back:   Symmetric    Lungs:   Normal breath sounds, no rales or wheezes   Chest wall:  No tenderness or deformity. Heart:  Regular rate and rhythm, S1, S2 normal, no murmur, click, rub or gallop. Abdomen:   Soft, non-tender. Bowel sounds normal. No masses,  No organomegaly. Extremities: Extremities normal, atraumatic, no cyanosis or edema. Pulses: 2+ and symmetric all extremities. Skin: Skin color, texture, turgor normal. No rashes or lesions   Lymph nodes: Cervical, supraclavicular nodes normal.   Neurologic: Grossly nonfocal     Data review:     Recent Results (from the past 24 hour(s))   CULTURE, BLOOD    Collection Time: 04/03/20  8:10 PM   Result Value Ref Range    Special Requests: NO SPECIAL REQUESTS      Culture result: NO GROWTH AFTER 9 HOURS     CULTURE, BLOOD    Collection Time: 04/03/20  8:10 PM   Result Value Ref Range    Special Requests: NO SPECIAL REQUESTS      Culture result: NO GROWTH AFTER 9 HOURS     CBC WITH AUTOMATED DIFF    Collection Time: 04/04/20  5:36 AM   Result Value Ref Range    WBC 4.0 (L) 4.6 - 13.2 K/uL    RBC 4.20 4. 20 - 5.30 M/uL    HGB 11.3 (L) 12.0 - 16.0 g/dL    HCT 36.4 35.0 - 45.0 %    MCV 86.7 74.0 - 97.0 FL    MCH 26.9 24.0 - 34.0 PG    MCHC 31.0 31.0 - 37.0 g/dL    RDW 14.2 11.6 - 14.5 %    PLATELET 633 512 - 675 K/uL    MPV 11.2 9.2 - 11.8 FL    NEUTROPHILS 53 40 - 73 %    LYMPHOCYTES 36 21 - 52 %    MONOCYTES 8 3 - 10 %    EOSINOPHILS 2 0 - 5 %    BASOPHILS 1 0 - 2 %    ABS. NEUTROPHILS 2.1 1.8 - 8.0 K/UL    ABS. LYMPHOCYTES 1.4 0.9 - 3.6 K/UL    ABS. MONOCYTES 0.3 0.05 - 1.2 K/UL    ABS. EOSINOPHILS 0.1 0.0 - 0.4 K/UL    ABS.  BASOPHILS 0.0 0.0 - 0.1 K/UL    DF AUTOMATED     METABOLIC PANEL, COMPREHENSIVE    Collection Time: 04/04/20  5:36 AM   Result Value Ref Range    Sodium 142 136 - 145 mmol/L    Potassium 3.6 3.5 - 5.5 mmol/L    Chloride 111 100 - 111 mmol/L    CO2 26 21 - 32 mmol/L    Anion gap 5 3.0 - 18 mmol/L    Glucose 92 74 - 99 mg/dL    BUN 12 7.0 - 18 MG/DL    Creatinine 0.60 0.6 - 1.3 MG/DL    BUN/Creatinine ratio 20 12 - 20      GFR est AA >60 >60 ml/min/1.73m2    GFR est non-AA >60 >60 ml/min/1.73m2    Calcium 8.4 (L) 8.5 - 10.1 MG/DL    Bilirubin, total 0.6 0.2 - 1.0 MG/DL    ALT (SGPT) 20 13 - 56 U/L    AST (SGOT) 13 10 - 38 U/L    Alk. phosphatase 52 45 - 117 U/L    Protein, total 6.3 (L) 6.4 - 8.2 g/dL    Albumin 3.3 (L) 3.4 - 5.0 g/dL    Globulin 3.0 2.0 - 4.0 g/dL    A-G Ratio 1.1 0.8 - 1.7     MAGNESIUM    Collection Time: 04/04/20  5:36 AM   Result Value Ref Range    Magnesium 2.1 1.6 - 2.6 mg/dL   PROTHROMBIN TIME + INR    Collection Time: 04/04/20  5:36 AM   Result Value Ref Range    Prothrombin time 13.6 11.5 - 15.2 sec    INR 1.1 0.8 - 1.2         Imaging:  I have personally reviewed the patients radiographs and have reviewed the reports:  XR Results (most recent):  Results from Hospital Encounter encounter on 04/03/20   XR CHEST PA LAT    Narrative CHEST PA AND LATERAL      HISTORY: Several episodes of hemoptysis over the last 12 to 18 hours, past  history of tuberculosis treated 3 years ago,. COMPARISONS: 3/15/2018, 9/29/2011. FINDINGS:     Two views of the chest were obtained on 4/3/2020. There are persistent coarse reticular nodular opacities in the right mid to  upper lung field extending to the apex and in the left upper lung field  extending to the apex. These changes on the left appear stable. There is a new  apparent pleural-based 1.1 cm nodule at the right lung apex. .      There is no evidence of pleural effusions, pneumothorax, or pulmonary vascular  congestion. Cardiac silhouette size is normal.      Mediastinal contours are unremarkable. Impression IMPRESSION:    Chronic changes in the left upper lung field extending to the apex are stable.     There are largely chronic changes in the right mid to upper lung field extending  to the apex with a new 1.1 cm right apical pleural based nodule. Further  evaluation with chest CT recommended for assessment. .     CT Results (most recent):  Results from Hospital Encounter encounter on 04/03/20   CT CHEST WO CONT    Narrative CT chest without enhancement     INDICATION: Right upper lobe nodule on radiograph. TECHNIQUE: 5 mm collimation axial images obtained from the thoracic inlet to the  level of the diaphragm without administration of low osmolar, nonionic  intravenous contrast.    Dose reduction techniques used: Automated exposure control, adjustment of the  mAs and/or kVp according to patient size, standardized low-dose protocol, and/or  iterative reconstruction technique. COMPARISON: Same day radiograph. CHEST FINDINGS:     Lymph nodes: No adenopathy by CT size criteria. Thyroid: Unremarkable. Mediastinum: Heart size is normal. No pericardial effusion. Minimal  atherosclerotic disease. Esophagus is underdistended. Lungs: There is extensive pleural parenchymal scarring in the lung apices, right  greater than left. There is some nodular right apical presumed scarring with a 9  x 10 mm nodule on axial image 8. There are smaller areas of nodularity as well. There is a small amount of groundglass in the right middle lobe. There is volume  loss and bronchiectasis in the medial aspect of the right middle lobe. Small  amount of groundglass in the inferior right upper lobe as well. No pleural  effusion. ABDOMEN:     No acute finding. Bones: Degenerative changes of the spine. Impression Impression:    Patchy groundglass in the right middle lobe and in a small portion of the  adjacent right upper lobe. Findings likely represent multifocal infection. No  dense consolidation. 3 month follow-up chest CT is advised to ensure resolution. Nodular pleural parenchymal scarring in the lung apices is seen. The largest  nodular region measures 10 x 9 mm.  Recommend attention on 3 month follow-up to  ensure stability as early lung neoplasm is not entirely excluded but considered  less likely. Focal region of volume loss in the medial aspect of the right middle lobe with  associated bronchiectasis. This is likely a chronic process. Recommend attention  on follow-up.              Jaimie Jackson MD

## 2020-04-04 NOTE — PROGRESS NOTES
Problem: Risk for Spread of Infection  Goal: Prevent transmission of infectious organism to others  Description: Prevent the transmission of infectious organisms to other patients, staff members, and visitors. Outcome: Progressing Towards Goal     Problem: Patient Education:  Go to Education Activity  Goal: Patient/Family Education  Outcome: Progressing Towards Goal     Problem: Injury - Risk of, Adverse Drug Event  Goal: *Absence of adverse drug events  Outcome: Progressing Towards Goal  Goal: *Absence of medication errors  Outcome: Progressing Towards Goal  Goal: *Knowledge of prescribed medications  Outcome: Progressing Towards Goal     Problem: Patient Education: Go to Patient Education Activity  Goal: Patient/Family Education  Outcome: Progressing Towards Goal     Problem: Falls - Risk of  Goal: *Absence of Falls  Description: Document Mauro Fall Risk and appropriate interventions in the flowsheet.   Outcome: Progressing Towards Goal  Note: Fall Risk Interventions:            Medication Interventions: Teach patient to arise slowly                   Problem: Patient Education: Go to Patient Education Activity  Goal: Patient/Family Education  Outcome: Progressing Towards Goal     Problem: Pain  Goal: *Control of Pain  Outcome: Progressing Towards Goal  Goal: *PALLIATIVE CARE:  Alleviation of Pain  Outcome: Progressing Towards Goal     Problem: Patient Education: Go to Patient Education Activity  Goal: Patient/Family Education  Outcome: Progressing Towards Goal

## 2020-04-04 NOTE — PROGRESS NOTES
[facility] Hospitalist Group  Progress Note    Patient: Figueroa Marie Age: 53 y.o. : 1965 MR#: [id number] SSN: [id number]  Date: 2020     Subjective:   Patient sitting up in chair by the window and appears comfortable. Had 5 episodes of hemoptysis yesterday and one episode this morning. Denies fever, chills, headaches, myalgias, SOB, chest pain, abd pain, nausea/vomiting, diarrhea, dysuria. Assessment/Plan:   Ms. Starr Yang is a 54 yo female who was admitted on 4/3/2020 with hemoptysis. Consults- PULM    1. Hemoptysis H/H stable, platelet count normal, coags normal.  2. Community acquired pneumonia   - ct chest wo contrast: groundglass in RML and RUL  3. Hx of mycobacterium kansasii infection    - follows with Dr. Jumana Delvalle. Was taking rifampin and ethambutol but due to elevated LFTs discontinued. 4. Hx of bronchiectasis without acute exacerbation   5. Allergic rhinitis and allergic asthma with + test for grass/trees/ragweed  6. GERD  7. Asthma without exacerbation      Microbio:  - sputum cultures collected. - sputum AFB x2? collected  - blood cultures neg to date     - 1 episode of hemoptysis this am,  Monitor closely. - in neg pressure room with airborne precautions. AFB x3 pending.   - resume azithromycin and ceftriaxone. addon procal and urine ags. - resume duonebs, arformoterol, budesonide. Bronchial hygiene protocol. supplemental o2 if needed- does not need currently   - follow up with pulm consult. - add PPI   - encourage OOB     Full code  dvt ppx- scds due to hemoptysis   Cardiac diet.       Additional Notes:      Case discussed with:  [x]Patient  []Family  []Nursing  []Case Management  DVT Prophylaxis:  []Lovenox  []Hep SQ  [x]SCDs  []Coumadin   []On Heparin gtt    Objective:   VS:   Visit Vitals  BP 96/59 (BP 1 Location: Left arm, BP Patient Position: At rest)   Pulse 72   Temp 98.2 °F (36.8 °C)   Resp 16   Ht 5' 6\" (1.676 m)   Wt 58.1 kg (128 lb) LMP 10/20/2011   SpO2 99%   BMI 20.66 kg/m²      Tmax/24hrs: Temp (24hrs), Av.4 °F (36.9 °C), Min:98.1 °F (36.7 °C), Max:98.6 °F (37 °C)      Intake/Output Summary (Last 24 hours) at 2020 1233  Last data filed at 2020 6538  Gross per 24 hour   Intake 807.5 ml   Output 300 ml   Net 507.5 ml       General: sitting up in chair,  NAD  Cardiovascular:  RRR  Pulmonary: on room air,   LSC throughout; respiratory effort WNL  GI:  +BS in all four quadrants, soft, non-tender  Extremities:  No edema  Neuro: awake, alert, ox3, moving all extremities, follows commands. Labs:    Recent Results (from the past 24 hour(s))   CULTURE, BLOOD    Collection Time: 20  8:10 PM   Result Value Ref Range    Special Requests: NO SPECIAL REQUESTS      Culture result: NO GROWTH AFTER 9 HOURS     CULTURE, BLOOD    Collection Time: 20  8:10 PM   Result Value Ref Range    Special Requests: NO SPECIAL REQUESTS      Culture result: NO GROWTH AFTER 9 HOURS     CBC WITH AUTOMATED DIFF    Collection Time: 20  5:36 AM   Result Value Ref Range    WBC 4.0 (L) 4.6 - 13.2 K/uL    RBC 4.20 4. 20 - 5.30 M/uL    HGB 11.3 (L) 12.0 - 16.0 g/dL    HCT 36.4 35.0 - 45.0 %    MCV 86.7 74.0 - 97.0 FL    MCH 26.9 24.0 - 34.0 PG    MCHC 31.0 31.0 - 37.0 g/dL    RDW 14.2 11.6 - 14.5 %    PLATELET 080 756 - 839 K/uL    MPV 11.2 9.2 - 11.8 FL    NEUTROPHILS 53 40 - 73 %    LYMPHOCYTES 36 21 - 52 %    MONOCYTES 8 3 - 10 %    EOSINOPHILS 2 0 - 5 %    BASOPHILS 1 0 - 2 %    ABS. NEUTROPHILS 2.1 1.8 - 8.0 K/UL    ABS. LYMPHOCYTES 1.4 0.9 - 3.6 K/UL    ABS. MONOCYTES 0.3 0.05 - 1.2 K/UL    ABS. EOSINOPHILS 0.1 0.0 - 0.4 K/UL    ABS.  BASOPHILS 0.0 0.0 - 0.1 K/UL    DF AUTOMATED     METABOLIC PANEL, COMPREHENSIVE    Collection Time: 20  5:36 AM   Result Value Ref Range    Sodium 142 136 - 145 mmol/L    Potassium 3.6 3.5 - 5.5 mmol/L    Chloride 111 100 - 111 mmol/L    CO2 26 21 - 32 mmol/L    Anion gap 5 3.0 - 18 mmol/L    Glucose 92 74 - 99 mg/dL    BUN 12 7.0 - 18 MG/DL    Creatinine 0.60 0.6 - 1.3 MG/DL    BUN/Creatinine ratio 20 12 - 20      GFR est AA >60 >60 ml/min/1.73m2    GFR est non-AA >60 >60 ml/min/1.73m2    Calcium 8.4 (L) 8.5 - 10.1 MG/DL    Bilirubin, total 0.6 0.2 - 1.0 MG/DL    ALT (SGPT) 20 13 - 56 U/L    AST (SGOT) 13 10 - 38 U/L    Alk.  phosphatase 52 45 - 117 U/L    Protein, total 6.3 (L) 6.4 - 8.2 g/dL    Albumin 3.3 (L) 3.4 - 5.0 g/dL    Globulin 3.0 2.0 - 4.0 g/dL    A-G Ratio 1.1 0.8 - 1.7     MAGNESIUM    Collection Time: 04/04/20  5:36 AM   Result Value Ref Range    Magnesium 2.1 1.6 - 2.6 mg/dL   PROTHROMBIN TIME + INR    Collection Time: 04/04/20  5:36 AM   Result Value Ref Range    Prothrombin time 13.6 11.5 - 15.2 sec    INR 1.1 0.8 - 1.2         Signed By: LISANDRA Macdonald     April 4, 2020

## 2020-04-04 NOTE — PROGRESS NOTES
Complained of feeling like increased heart rate and jittery     04/04/20 0834   Oxygen Therapy   O2 Sat (%) 99 %   Pulse via Oximetry 84 beats per minute   O2 Device Room air   Pre-Treatment   Breathing Pattern Regular   Breath Sounds Bilateral Diminished   Pulse 84   SpO2 99 %   Respirations 16   Post-Treatment   Breathing Pattern Regular   Breath Sounds Bilateral Diminished   Pulse 85   SpO2 99 %   Respirations 16   Treatment Tolerance Well   Procedures   $$ Subsequent Procedure Aerosol   Delivery Source Mouthpiece   Aerosolized Medications Mk Gutierrez

## 2020-04-05 VITALS
HEIGHT: 66 IN | DIASTOLIC BLOOD PRESSURE: 63 MMHG | WEIGHT: 139 LBS | TEMPERATURE: 97.7 F | HEART RATE: 78 BPM | BODY MASS INDEX: 22.34 KG/M2 | SYSTOLIC BLOOD PRESSURE: 101 MMHG | RESPIRATION RATE: 17 BRPM | OXYGEN SATURATION: 98 %

## 2020-04-05 LAB
L PNEUMO AG UR QL IA: NEGATIVE
S PNEUM AG UR QL: NEGATIVE

## 2020-04-05 PROCEDURE — 74011250637 HC RX REV CODE- 250/637: Performed by: PHYSICIAN ASSISTANT

## 2020-04-05 PROCEDURE — 94640 AIRWAY INHALATION TREATMENT: CPT

## 2020-04-05 PROCEDURE — 74011000250 HC RX REV CODE- 250: Performed by: EMERGENCY MEDICINE

## 2020-04-05 PROCEDURE — 74011250637 HC RX REV CODE- 250/637: Performed by: EMERGENCY MEDICINE

## 2020-04-05 PROCEDURE — 74011250637 HC RX REV CODE- 250/637: Performed by: HOSPITALIST

## 2020-04-05 RX ORDER — CEFPODOXIME PROXETIL 200 MG/1
200 TABLET, FILM COATED ORAL 2 TIMES DAILY
Qty: 8 TAB | Refills: 0 | Status: SHIPPED | OUTPATIENT
Start: 2020-04-05 | End: 2020-04-05 | Stop reason: SDUPTHER

## 2020-04-05 RX ORDER — CEFPODOXIME PROXETIL 200 MG/1
200 TABLET, FILM COATED ORAL 2 TIMES DAILY
Qty: 10 TAB | Refills: 0 | Status: SHIPPED | OUTPATIENT
Start: 2020-04-05 | End: 2020-04-10

## 2020-04-05 RX ORDER — AZITHROMYCIN 250 MG/1
500 TABLET, FILM COATED ORAL DAILY
Qty: 6 TAB | Refills: 0 | Status: SHIPPED | OUTPATIENT
Start: 2020-04-05 | End: 2020-04-10

## 2020-04-05 RX ADMIN — THERA TABS 1 TABLET: TAB at 08:27

## 2020-04-05 RX ADMIN — MELATONIN 1 TABLET: at 08:27

## 2020-04-05 RX ADMIN — AZITHROMYCIN 500 MG: 250 TABLET, FILM COATED ORAL at 09:00

## 2020-04-05 RX ADMIN — ARFORMOTEROL TARTRATE: 15 SOLUTION RESPIRATORY (INHALATION) at 08:40

## 2020-04-05 RX ADMIN — Medication 500 MG: at 08:27

## 2020-04-05 RX ADMIN — PANTOPRAZOLE SODIUM 40 MG: 40 TABLET, DELAYED RELEASE ORAL at 08:27

## 2020-04-05 NOTE — PROGRESS NOTES
Patient is not available to be assessed at this time.       [address] [facility].   [phone]

## 2020-04-05 NOTE — ROUTINE PROCESS
Bedside shift change report given to Accurence (oncoming nurse) by Karyn Ayala (offgoing nurse). Report included the following information SBAR, Kardex, Intake/Output, MAR and Recent Results.

## 2020-04-05 NOTE — PROGRESS NOTES
Progress Note  Pulmonary, Critical Care, and Sleep Medicine    Name: Aleyda Soria MRN: [id number]   : 1965 Hospital: [facility]   Date: 2020        IMPRESSION:   · Hemoptysis likely related to bronchiectasis exacerbation probably triggered by poor compliance with Azithromycin suppression. No hemoptysis since 0730 yesterday  · Nodular pleural parenchymal scarring probably related to M kansasii infection  · Chronic M kansasii infection on Azithromycin suppression. Elevated LFT's with Rifampin and Ethambutol       PLAN:   · May switch to po antibiotics  · Ok to discharge  Per pulmonary perspective, will arrange for office follow up  · Encouraged to resume Azithromycin suppression therapy after completing antibiotics for this admission  · Schedule close CT follow up ~ 3 months, to be done as outpatient  · Discussed with Dr. Segun Parada     Subjective/Interval History:   I have reviewed the flowsheet and previous days notes. Reviewed interval history. Discussed management with nursing staff. Pt is at baseline. No further hemoptysis, last episode at 0730 yesterday  No chest pain or SOB  Only complaint is mild epistaxis x 1.      ROS:Pertinent items are noted in HPI. Orders reviewed including medications. Changes made if indicated. Telemetry monitor reviewed at the bedside. Objective:   Vital Signs:    Visit Vitals  /63 (BP 1 Location: Right arm, BP Patient Position: At rest)   Pulse 78   Temp 97.7 °F (36.5 °C)   Resp 17   Ht 5' 6\" (1.676 m)   Wt 63 kg (139 lb)   LMP 10/20/2011   SpO2 98%   BMI 22.44 kg/m²       O2 Device: Room air       Temp (24hrs), Av.3 °F (36.8 °C), Min:97.7 °F (36.5 °C), Max:98.8 °F (37.1 °C)       Intake/Output:   Last shift:      701 - 1900  In: 370 [P.O.:360; I.V.:10]  Out: 500 [Urine:500]  Last 3 shifts: 1901 -  0700  In: 2293.8 [P.O.:840;  I.V.:1453.8]  Out: 2300 [Urine:2300]    Intake/Output Summary (Last 24 hours) at 2020 5315 Inkive filed at 4/5/2020 0748  Gross per 24 hour   Intake 870 ml   Output 2500 ml   Net -1630 ml        Physical Exam:    General:  Alert, cooperative, in no distress, appears stated age. Head:  Normocephalic, without obvious abnormality, atraumatic. Eyes:  ANicteric, PERRL,   Nose: Nares normal. Mucosa normal. No drainage or sinus tenderness. Throat: Lips, mucosa, and tongue normal.  NO Thrush   Neck: Supple, symmetrical, trachea midline, no adenopathy, thyroid: no enlargment/tenderness/nodules    Back:   Symmetric    Lungs:   Bilateral auscultation no rales or wheezes   Chest wall:  No tenderness or deformity. NO intercostal retractions   Heart:  Regular rate and rhythm, S1, S2 normal, no murmur, click, rub or gallop. Abdomen:   Soft, non-tender. Bowel sounds normal. No masses,  No organomegaly. NO paradoxical motion   Extremities: normal, atraumatic, no cyanosis or edema. Pulses: 2+ and symmetric all extremities. Skin: Skin color, texture, turgor normal. No rashes or lesions. NO clubbing   Lymph nodes: Cervical, supraclavicular nodes normal.   Neurologic: Grossly nonfocal      :        Devices:             Drips:    DATA:  Labs:  Recent Labs     04/04/20  0536 04/03/20  1016   WBC 4.0* 4.2*   HGB 11.3* 13.0   HCT 36.4 40.6    203     Recent Labs     04/04/20  0536 04/03/20  1016    145   K 3.6 4.1    111   CO2 26 30   GLU 92 95   BUN 12 11   CREA 0.60 0.66   CA 8.4* 9.3   MG 2.1  --    ALB 3.3*  --    SGOT 13  --    ALT 20  --    INR 1.1  --      No results for input(s): PH, PCO2, PO2, HCO3, FIO2 in the last 72 hours. Imaging:  []        I have personally reviewed the patients radiographs and reports  []         []        No change from prior, tubes and lines in adequate position  []        Improved   []        Worsening  High complexity decision making was performed during this consultation and evaluation.  Critical care time exclusive of procedures spent managing the patient:      minutes    Skyler Malloy MD

## 2020-04-05 NOTE — DISCHARGE INSTRUCTIONS
Patient Education        Coughing Up Blood: Care Instructions  Your Care Instructions    Coughing up blood can be frightening. The blood may come from the lungs, stomach, or throat. You may cough up a few thin streaks of bright red blood. This probably is not a cause for concern. Coughing up large amounts of bright red blood or rust-colored mucus from the lungs can be a symptom of a more serious condition. Several conditions can make you cough up blood from the lungs. These include bronchitis and pneumonia, or more serious problems such as cancer or a blood clot in the lung (pulmonary embolus). Depending on what is causing your cough, it may go away after the illness is treated. Your doctor may tell you not to suppress the cough with cough medicine if it is better for you to cough up the blood and spit it out. Follow-up care is a key part of your treatment and safety. Be sure to make and go to all appointments, and call your doctor if you are having problems. It's also a good idea to know your test results and keep a list of the medicines you take. How can you care for yourself at home? · Make a note of when and for how long you cough up blood. Also note if you are coughing up spit with a small amount of blood, or mostly blood. Take this information to your next appointment with your doctor. · Increase your fluid intake to at least 8 to 10 glasses of water every day. This helps keep the mucus thin and helps you cough it up. If you have kidney, heart, or liver disease and have to limit fluids, talk with your doctor before you increase your fluid intake. · If your doctor prescribed antibiotics, take them as directed. Do not stop taking them just because you feel better. You need to take the full course of antibiotics. · Do not take cough medicine without your doctor's guidance. They can cause problems if you have other health problems. They can also interact with other medicine.   · Do not smoke or use other forms of tobacco, especially while you have a cough. Smoking can make coughing worse. If you need help quitting, talk to your doctor about stop-smoking programs and medicines. These can increase your chances of quitting for good. · Avoid exposure to smoke, dust, or other pollutants. When should you call for help? Call 911 anytime you think you may need emergency care. For example, call if:    · You have sudden chest pain and shortness of breath.     · You have severe trouble breathing.    Call your doctor now or seek immediate medical care if:    · You have wheezing and difficulty breathing.     · You are dizzy or lightheaded, or you feel like you may faint.     · You cough up clots of blood.    Watch closely for changes in your health, and be sure to contact your doctor if:    · You do not get better as expected.     · You have any new symptoms, such as chest pain with difficulty breathing or a fever. Where can you learn more? Go to http://isaac-sariah.info/  Enter M550 in the search box to learn more about \"Coughing Up Blood: Care Instructions. \"  Current as of: June 26, 2019Content Version: 12.4  © 5562-9511 Healthwise, Incorporated. Care instructions adapted under license by [employer] (which disclaims liability or warranty for this information). If you have questions about a medical condition or this instruction, always ask your healthcare professional. [address] any warranty or liability for your use of this information. Patient Education        Learning About Hospital-Acquired Pneumonia (HAP)  What is it? Hospital-acquired pneumonia (HAP) is pneumonia that you get when you are in a hospital or nursing home. It also can happen to people who have been on a machine to help them breathe (ventilator). HAP is more serious than pneumonia that people get in daily life. That's because someone with HAP may already have a serious illness. Pneumonia in the hospital is also often caused by different bacteria than the ones that usually cause pneumonia. These other bacteria may be stronger and harder to treat with antibiotics. What increases your risk? You're more likely to get HAP if:  · You have another serious condition, especially another lung disease, such as COPD. · You don't eat enough healthy foods. · You have a weak immune system. · You've been in the hospital.  · You took antibiotics recently. · You are age 54 or older. What are the symptoms? Symptoms of HAP include:  · A fever. · Trouble breathing. · A cough with mucus. · Feeling very tired or very weak. Symptoms may start 2 days or more after you go into the hospital or nursing home. They may also start shortly after being sent home. How is it diagnosed? It's important to diagnose HAP quickly. If your doctor thinks that you have it, you will have a chest X-ray. Your doctor may also look at a sample of your mucus and blood. How is it treated? Most types of HAP are treated with an antibiotic that kills many types of bacteria. This may be done before your doctor knows which type of bacteria caused your infection. Your antibiotic may be changed after tests show which bacteria you have. Follow-up care is a key part of your treatment and safety. Be sure to make and go to all appointments, and call your doctor if you are having problems. It's also a good idea to know your test results and keep a list of the medicines you take. Where can you learn more? Go to http://isaac-sariah.info/  Enter H110 in the search box to learn more about \"Learning About Hospital-Acquired Pneumonia (HAP). \"  Current as of: June 9, 2019Content Version: 12.4  © 6225-1996 Healthwise, Incorporated. Care instructions adapted under license by [employer] (which disclaims liability or warranty for this information).  If you have questions about a medical condition or this instruction, always ask your healthcare professional. [place] [address] any warranty or liability for your use of this information. Patient Education        Pneumonia: Care Instructions  Your Care Instructions    Pneumonia is an infection of the lungs. Most cases are caused by infections from bacteria or viruses. Pneumonia may be mild or very severe. If it is caused by bacteria, you will be treated with antibiotics. It may take a few weeks to a few months to recover fully from pneumonia, depending on how sick you were and whether your overall health is good. Follow-up care is a key part of your treatment and safety. Be sure to make and go to all appointments, and call your doctor if you are having problems. It's also a good idea to know your test results and keep a list of the medicines you take. How can you care for yourself at home? · Take your antibiotics exactly as directed. Do not stop taking the medicine just because you are feeling better. You need to take the full course of antibiotics. · Take your medicines exactly as prescribed. Call your doctor if you think you are having a problem with your medicine. · Get plenty of rest and sleep. You may feel weak and tired for a while, but your energy level will improve with time. · To prevent dehydration, drink plenty of fluids, enough so that your urine is light yellow or clear like water. Choose water and other caffeine-free clear liquids until you feel better. If you have kidney, heart, or liver disease and have to limit fluids, talk with your doctor before you increase the amount of fluids you drink. · Take care of your cough so you can rest. A cough that brings up mucus from your lungs is common with pneumonia. It is one way your body gets rid of the infection. But if coughing keeps you from resting or causes severe fatigue and chest-wall pain, talk to your doctor.  He or she may suggest that you take a medicine to reduce the cough.  · Use a vaporizer or humidifier to add moisture to your bedroom. Follow the directions for cleaning the machine. · Do not smoke or allow others to smoke around you. Smoke will make your cough last longer. If you need help quitting, talk to your doctor about stop-smoking programs and medicines. These can increase your chances of quitting for good. · Take an over-the-counter pain medicine, such as acetaminophen (Tylenol), ibuprofen (Advil, Motrin), or naproxen (Aleve). Read and follow all instructions on the label. · Do not take two or more pain medicines at the same time unless the doctor told you to. Many pain medicines have acetaminophen, which is Tylenol. Too much acetaminophen (Tylenol) can be harmful. · If you were given a spirometer to measure how well your lungs are working, use it as instructed. This can help your doctor tell how your recovery is going. · To prevent pneumonia in the future, talk to your doctor about getting a flu vaccine (once a year) and a pneumococcal vaccine (one time only for most people). When should you call for help? Call 911 anytime you think you may need emergency care. For example, call if:    · You have severe trouble breathing.    Call your doctor now or seek immediate medical care if:    · You cough up dark brown or bloody mucus (sputum).     · You have new or worse trouble breathing.     · You are dizzy or lightheaded, or you feel like you may faint.    Watch closely for changes in your health, and be sure to contact your doctor if:    · You have a new or higher fever.     · You are coughing more deeply or more often.     · You are not getting better after 2 days (48 hours).     · You do not get better as expected. Where can you learn more? Go to http://isaac-sariah.info/  Enter D336 in the search box to learn more about \"Pneumonia: Care Instructions. \"  Current as of: June 9, 2019Content Version: 12.4  © 9485-2843 Healthwise, Incorporated. Care instructions adapted under license by [employer] (which disclaims liability or warranty for this information). If you have questions about a medical condition or this instruction, always ask your healthcare professional. [address] any warranty or liability for your use of this information.

## 2020-04-05 NOTE — DISCHARGE SUMMARY
Physician Discharge Summary       Patient: Josef Rivas MRN: [id number]  SSN: [id number]    YOB: 1965  Age: 53 y.o. Sex: female    PCP: Gt Albright MD    Allergies: Percocet [oxycodone-acetaminophen]    Admit date: 4/3/2020  Admitting Provider: Bev Hernandez MD    Discharge date: 4/5/2020  Discharging Provider: LISANDRA Leblanc    * Admission Diagnoses: Hemoptysis [R04.2]  Pneumonia [J18.9]  Hemoptysis [R04.2]    * Discharge Diagnoses:    1. Hemoptysis from bronchiectasis   2. Community acquired pneumonia in RML and RUL   3. Hx of mycobacterium kansasii infection    4. Hx of bronchiectasis without acute exacerbation   5. Allergic rhinitis and allergic asthma with + test for grass/trees/ragweed  6. GERD  7. Asthma without exacerbation      HPI per admitting MD: This is a 58-year-old female with a known past medical history of bronchiectasis and a history of Mycobacterium kansasii infection who presented to the ED with complaints of hemoptysis. Patient states that the first episode of hemoptysis happened yesterday morning and then she had another episode yesterday night and then another one this morning which prompted her to come to the ED. Patient states that in these 3 episodes she might have coughed out close to blood. No history of any fever or chills. No history of any nausea vomiting. No history of any recent travel. No history of any sick contacts. No history of any loss of taste or smell. No history of any chest pain or shortness of breath. No history of any headaches numbness or focal weakness. No history of any abdominal pain urinary complaints diarrhea or rectal bleeding. No history of any leg swelling or rash.     [facility] Course: Ms. Savannah Rodriguez is a 54 yo female with a past medical history of mycobacterium kansasii infection, bronchiectasis with hemoptysis in the past, asthma, who was admitted on 4/3/2020 with hemoptysis.  CT of the chest showed groundglass opacities in RML and RUL. She was admitted for monitoring of hemoptysis and community acquired pneumonia. While in the hospital, she only had one isolated episode of hemoptysis. Her H/H, coags and platelets were normal. AFB was ordered but she was only able to provide one sample because she could not cough up sputum for the remaining two samples. She was continued on iv ceftriaxone and oral azithromycin. Her urine Ags are negative, blood cultures neg to date, sputum culture neg. Procal neg. She remained comfortable and stable on room air throughout hospitalization. Pulmonary Dr. Mehreen Merida was consulted. Pulm recommended to continue  Azithromycin and cefpodoxime at home for a 7 day course. Patient will need to follow up with Pulm Dr. Noni Soulier with CT chest follow up in 3 months. Discharge plan:  - she had 2 doses of IV ceftriaxone in the hospital. Will need 5 more days of cefpodoxime. - she had 2 doses of azithromycin in the hospital. Will need 5 more days at home. Patient told me she used to take azithromycin 3 days a week. I told her to please complete this 5 day course and call Dr. Sharon Peacock for recommendations regarding continuation of azithromycin for prophylaxis. - patient counseled on social distancing and staying at home as much as possible during this covid 19 pandemic.      * Procedures: none   * No surgery found *    Consults: PULM     Significant Diagnostic Studies:   Recent Results (from the past 24 hour(s))   PROCALCITONIN    Collection Time: 04/04/20  2:01 PM   Result Value Ref Range    Procalcitonin <0.05 ng/mL   LEGIONELLA PNEUMOPHILA AG, URINE    Collection Time: 04/04/20 11:15 PM   Result Value Ref Range    Legionella Ag, urine NEGATIVE  NEG     STREP PNEUMO AG, URINE    Collection Time: 04/04/20 11:15 PM   Result Value Ref Range    Strep pneumo Ag, urine NEGATIVE  NEG       CT Results (most recent):  Results from [place] encounter on 04/03/20   CT CHEST WO CONT    Narrative CT chest without enhancement     INDICATION: Right upper lobe nodule on radiograph. TECHNIQUE: 5 mm collimation axial images obtained from the thoracic inlet to the  level of the diaphragm without administration of low osmolar, nonionic  intravenous contrast.    Dose reduction techniques used: Automated exposure control, adjustment of the  mAs and/or kVp according to patient size, standardized low-dose protocol, and/or  iterative reconstruction technique. COMPARISON: Same day radiograph. CHEST FINDINGS:     Lymph nodes: No adenopathy by CT size criteria. Thyroid: Unremarkable. Mediastinum: Heart size is normal. No pericardial effusion. Minimal  atherosclerotic disease. Esophagus is underdistended. Lungs: There is extensive pleural parenchymal scarring in the lung apices, right  greater than left. There is some nodular right apical presumed scarring with a 9  x 10 mm nodule on axial image 8. There are smaller areas of nodularity as well. There is a small amount of groundglass in the right middle lobe. There is volume  loss and bronchiectasis in the medial aspect of the right middle lobe. Small  amount of groundglass in the inferior right upper lobe as well. No pleural  effusion. ABDOMEN:     No acute finding. Bones: Degenerative changes of the spine. Impression Impression:    Patchy groundglass in the right middle lobe and in a small portion of the  adjacent right upper lobe. Findings likely represent multifocal infection. No  dense consolidation. 3 month follow-up chest CT is advised to ensure resolution. Nodular pleural parenchymal scarring in the lung apices is seen. The largest  nodular region measures 10 x 9 mm. Recommend attention on 3 month follow-up to  ensure stability as early lung neoplasm is not entirely excluded but considered  less likely. Focal region of volume loss in the medial aspect of the right middle lobe with  associated bronchiectasis.  This is likely a chronic process. Recommend attention  on follow-up. Discharge Exam:  General: sitting up in chair,  NAD  Cardiovascular:  RRR  Pulmonary: on room air,   LSC throughout; respiratory effort WNL  GI:  +BS in all four quadrants, soft, non-tender  Extremities:  No edema  Neuro: awake, alert, ox3, moving all extremities, follows commands. * Discharge Condition: improved  * Disposition: Home    Discharge Medications:  Current Discharge Medication List      START taking these medications    Details   cefpodoxime (VANTIN) 200 mg tablet Take 1 Tab by mouth two (2) times a day for 5 days. Indications: pneumonia  Qty: 10 Tab, Refills: 0         CONTINUE these medications which have CHANGED    Details   azithromycin (ZITHROMAX) 250 mg tablet Take 2 Tabs by mouth daily for 5 days. Indications: pneumonia  Qty: 6 Tab, Refills: 0    Associated Diagnoses: Pneumonia of right middle lobe due to infectious organism ([facility] [address]         CONTINUE these medications which have NOT CHANGED    Details   ascorbic acid, vitamin C, (Vitamin C) 500 mg tablet Take  by mouth. BREO ELLIPTA 100-25 mcg/dose inhaler INHALE 1 PUFF BY MOUTH ONCE DAILY  Qty: 3 Inhaler, Refills: 3      VENTOLIN HFA 90 mcg/actuation inhaler INHALE TWO PUFFS BY MOUTH EVERY 4 HOURS AS NEEDED  Qty: 1 Inhaler, Refills: 6    Comments: Please consider 90 day supplies to promote better adherence      fluticasone (FLONASE ALLERGY RELIEF) 50 mcg/actuation nasal spray 2 Sprays by Both Nostrils route daily. MULTIVITAMIN PO Take 1 Tab by mouth daily. cholecalciferol, vitamin d3, (VITAMIN D) 1,000 unit tablet Take 1,000 Units by mouth daily. STOP taking these medications       SIENA LAM, Comments:   Reason for Stopping:               * Follow-up Care/Patient Instructions:   Activity: Activity as tolerated  Diet: Cardiac Diet  Wound Care: None needed    Follow-up Information     Follow up With Specialties Details Why Contact Info    Sahra Fields MD [place] [address]  [address] [facility]  [phone]            Signed:  Sami López  4/5/2020  12:04 PM

## 2020-04-05 NOTE — PROGRESS NOTES
Tried to call pt on the phone but she did not . Per pt's nurse Frank Rodriguez, pt has no discharge needs. She has a ride home.         Orlin Lal, BSN RN  Care Management  Pager: [phone]

## 2020-04-06 LAB
BACTERIA SPEC CULT: NORMAL
GRAM STN SPEC: NORMAL
SERVICE CMNT-IMP: NORMAL

## 2020-04-09 LAB
BACTERIA SPEC CULT: NORMAL
BACTERIA SPEC CULT: NORMAL
SERVICE CMNT-IMP: NORMAL
SERVICE CMNT-IMP: NORMAL

## 2020-04-16 ENCOUNTER — VIRTUAL VISIT (OUTPATIENT)
Dept: PULMONOLOGY | Age: 55
End: 2020-04-16

## 2020-04-16 DIAGNOSIS — A31.0 MYCOBACTERIUM KANSASII INFECTION (HCC): ICD-10-CM

## 2020-04-16 DIAGNOSIS — J45.20 MILD INTERMITTENT ASTHMA WITHOUT COMPLICATION: ICD-10-CM

## 2020-04-16 DIAGNOSIS — R04.2 HEMOPTYSIS: Primary | ICD-10-CM

## 2020-04-16 DIAGNOSIS — J47.9 BRONCHIECTASIS WITHOUT COMPLICATION (HCC): ICD-10-CM

## 2020-04-16 RX ORDER — AZITHROMYCIN 250 MG/1
250 TABLET, FILM COATED ORAL DAILY
COMMUNITY
End: 2020-05-29

## 2020-04-16 NOTE — PROGRESS NOTES
Yobany Tarango is a 53 y.o. female who was seen by synchronous (real-time) audio-video technology on 4/16/2020. Consent:  She and/or her healthcare decision maker is aware that this patient-initiated Telehealth encounter is a billable service, with coverage as determined by her insurance carrier. She is aware that she may receive a bill and has provided verbal consent to proceed: Yes    I was in the office while conducting this encounter. Assessment & Plan:   Diagnoses and all orders for this visit:    1. Hemoptysis-resolved most likely related to a friable blood vessel within the bronchiectatic segment with likely bacterial superinfection. 2. Bronchiectasis without complication ([address]    3. Mild intermittent asthma without complication    4. Mycobacterium kansasii infection (HCC)-recent AFB x2-. No indication for bronchoscopy unless she has hemoptysis again at which time would consider to localize a source of bleeding. IMPRESSION:   · Bronchiectasis- recent recurring hemoptysis- ? Superinfection with atypical mycobacteria. Cultures isolated Riafadi Rylee so initiated treatment but patient developed significant systemic symptoms of fatigue, malaise, anorexia, flu like aches and fevers/chills with > 5 fold increase in LFT's - likely all rifampin related so instructed to stop and has since recovered. ·  Lower respiratory tract infection:Predominant location in RML with some RUL distribution and biapical pleuroparenchymal infiltrates/fibrosis suggests a sequelae of remote inflammatory/granulomatous process. · CT scan (2016) with some scattered areas of infiltrates-Secondary to above  · Hemoptysis secondary to above  · Reactive airways disease- allergic vs secondary to above.  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 Repeat 10/2017- normal range.       RECOMMENDATIONS:      · Continue Azithromycin- will change to 3 times a week for chronic suppressive treatment  · Will check HRCT and sputum for cultures and AFB periodically as indicted  · Continue  Breo-ellipta , prn albuterol  · Instructed to humidify ambient atmosphere- cool mist humidifier  · Discussed strict allergen avoidance- grasses, trees. · Encouraged use of mask if outdoors in spring/fall  · Strict GERD precautions and add PPI  · Discussed at length diagnosis and treatment plan- written instructions given. · Will consider allergen therapy if unable to control with current  interventions  · Discussed new lab results- specifically IgG subclass ( now normal)  · Discussed vigilance and checking out for infectious /allergic triggers in work space  · Preventive vaccinations- Pneumovax PPSV23 up to date  ·  Influenza vaccine- recieved  · Will follow up in 4 months and sooner if needed             Subjective:   Pili Rivera was seen for Cough (F/U to 1/21 [facility] disch. 4/5 CXR & CT 4/3)  For follow-up after recent hospital discharge. Patient was admitted to [facility] with a bout of hemoptysis-significant one-time expectoration of blood with no predisposing complaints of having increasing cough congestion fever or chills. She was treated with antibiotics and discharged. Sputum cultures for strep and Legionella were negative  AFB x2 were also negative  Since returning home she states she has been doing much better-has been on daily azithromycin and tolerating it well. Continues to use her Marthena Perry and has not needed to use Ventolin as much. She is currently working from home. Staying safe and following social distancing and hand hygiene. Denies any new complaints. Had some questions regarding resuming turmeric which she was taking.   Also had questions if she needs a bronchoscopy    Prior to Admission medications    Medication Sig Start Date End Date Taking? Authorizing Provider   azithromycin (Zithromax) 250 mg tablet Take 250 mg by mouth daily. Yes Provider, Historical   ascorbic acid, vitamin C, (Vitamin C) 500 mg tablet Take 500 mg by mouth daily. Yes Other, MD CHRISTIAN Cortes 100-25 mcg/dose inhaler INHALE 1 PUFF BY MOUTH ONCE DAILY 12/9/19  Yes Iris Jacobs MD   VENTOLIN HFA 90 mcg/actuation inhaler INHALE TWO PUFFS BY MOUTH EVERY 4 HOURS AS NEEDED 3/4/19  Yes Jossue GAUTHIER MD   fluticasone (FLONASE ALLERGY RELIEF) 50 mcg/actuation nasal spray 2 Sprays by Both Nostrils route daily. Yes Provider, Historical   MULTIVITAMIN PO Take 1 Tab by mouth daily. Gummies two daily   Yes Provider, Historical   cholecalciferol, vitamin d3, (VITAMIN D) 1,000 unit tablet Take 1,000 Units by mouth daily.    Yes Provider, Historical     Allergies   Allergen Reactions    Percocet [Oxycodone-Acetaminophen] Nausea Only       Patient Active Problem List   Diagnosis Code    Allergic rhinitis J30.9    Hemoptysis R04.2    Bronchiectasis without complication ([facility] [address] Z35.3    Mild intermittent asthma without complication H76.96    Mycobacterium kansasii infection ([facility] [address] A31.0    Pneumonia J18.9       Review of Systems   Per HPI    Vital Signs: (As obtained by patient/caregiver at home)  Visit Vitals  LMP 10/20/2011        Constitutional: [x] Appears well-developed and well-nourished [x] No apparent distress        Mental status: [x] Alert and awake  [x] Oriented to person/place/time [x] Able to follow commands    [] Abnormal -     Eyes:   EOM    [x]  Normal        Sclera  [x]  Normal        HENT: [x] Normocephalic, atraumatic    [x] Mouth/Throat: Mucous membranes are moist    External Ears [x] Normal      Neck: [x] No visualized mass     Pulmonary/Chest: [x] Respiratory effort normal   [x] No visualized signs of difficulty breathing or respiratory distress           Musculoskeletal:   [x] Normal gait with no signs of ataxia         [x] Normal range of motion of neck            Neurological:        [x] No Facial Asymmetry (Cranial nerve 7 motor function) (limited exam due to video visit)          [x] No gaze palsy                Skin:        [x] No significant exanthematous lesions or discoloration noted on facial skin                   Psychiatric:       [x] Normal Affect           Other pertinent observable physical exam findings:-        We discussed the expected course, resolution and complications of the diagnosis(es) in detail. Medication risks, benefits, costs, interactions, and alternatives were discussed as indicated. I advised her to contact the office if her condition worsens, changes or fails to improve as anticipated. She expressed understanding with the diagnosis(es) and plan. This patient is being evaluated by a video visit encounter for concerns as above. A caregiver was present when appropriate. Due to this being a TeleHealth encounter (During [facility]- public health emergency), evaluation of the following organ systems was limited: Vitals/Constitutional/EENT/Resp/CV/GI//MS/Neuro/Skin/Heme-Lymph-Imm. Pursuant to the emergency declaration under the [facility], [facility] waiver authority and the [employer] Act, this Virtual  Visit was conducted, with patient's (and/or legal guardian's) consent, to reduce the patient's risk of exposure to COVID-19 and provide necessary medical care. Services were provided through a video synchronous discussion virtually to substitute for in-person clinic visit. Patient located in his home. Provider located in clinic.     Vesna Johnson MD

## 2020-04-16 NOTE — PROGRESS NOTES
The pt. States she has what she believes to be a prod. Cough of  residual dark red/brown specks in her sputum. Off and on. She restarted Azithromycin 250 mg daily once done the antibiotic on discharge from [facility]. She questions use of Vit. C increasing.

## 2020-04-21 ENCOUNTER — TELEPHONE (OUTPATIENT)
Dept: PULMONOLOGY | Age: 55
End: 2020-04-21

## 2020-04-21 NOTE — TELEPHONE ENCOUNTER
Eben Worley with Micro lab at [facility] calling with Positive AFB culture. Sputum Culture obtained 4/4/20. So far only thing back is TB which is negative.  Results faxed to office

## 2020-04-22 NOTE — TELEPHONE ENCOUNTER
OK- She has history of Atypical mycobacterial infection- will follow final cultures  No additional treatment needed- on long term Azithromycin

## 2020-04-24 ENCOUNTER — TELEPHONE (OUTPATIENT)
Dept: PULMONOLOGY | Age: 55
End: 2020-04-24

## 2020-04-24 NOTE — TELEPHONE ENCOUNTER
Micro lab calling with ID on AFB culture from 4/4/20.   Positive for M-Avium   They are faxing report

## 2020-05-01 LAB
ACID FAST STN SPEC: NEGATIVE
AMIKACIN ISLT MIC: ABNORMAL
CIPROFLOXACIN ISLT MIC: ABNORMAL
CLARITHRO ISLT MIC: ABNORMAL
ETHAMBUTOL ISLT MIC: ABNORMAL
LINEZOLID ISLT MIC: ABNORMAL
M AVIUM CMPLX RRNA SPEC QL PROBE: POSITIVE
M GORDONAE RRNA SPEC QL PROBE: ABNORMAL
M KANSASII RRNA SPEC QL PROBE: ABNORMAL
M TB CMPLX RRNA SPEC QL PROBE: NEGATIVE
MICROORGANISM/AGENT SPEC: ABNORMAL
MOXIFLOXACIN ISLT MIC: ABNORMAL
MYCOBACTERIUM SPEC QL CULT: POSITIVE
OTHER, AFR6: ABNORMAL
PLEASE NOTE, AFR16: ABNORMAL
RIFAMPIN ISLT MIC: ABNORMAL
SOURCE, RSRC55: ABNORMAL
SPECIMEN PREPARATION: ABNORMAL
SPECIMEN SOURCE: ABNORMAL
SPECIMEN SOURCE: ABNORMAL
STREPTOMYCIN ISLT MIC: ABNORMAL
SUSCEPT TESTING, RAFBI7: ABNORMAL

## 2020-05-19 LAB
ACID FAST STN SPEC: NEGATIVE
MYCOBACTERIUM SPEC QL CULT: NEGATIVE
SPECIMEN PREPARATION: NORMAL
SPECIMEN SOURCE: NORMAL

## 2020-05-29 DIAGNOSIS — A31.0 MYCOBACTERIUM KANSASII INFECTION (HCC): Primary | ICD-10-CM

## 2020-05-29 RX ORDER — AZITHROMYCIN 250 MG/1
TABLET, FILM COATED ORAL
Qty: 30 TAB | Refills: 0 | Status: SHIPPED | OUTPATIENT
Start: 2020-05-29 | End: 2020-07-01

## 2020-07-01 DIAGNOSIS — A31.0 MYCOBACTERIUM KANSASII INFECTION (HCC): ICD-10-CM

## 2020-07-01 RX ORDER — FLUTICASONE FUROATE AND VILANTEROL TRIFENATATE 100; 25 UG/1; UG/1
POWDER RESPIRATORY (INHALATION)
Qty: 3 INHALER | Refills: 3 | Status: SHIPPED | OUTPATIENT
Start: 2020-07-01 | End: 2021-04-05 | Stop reason: SDUPTHER

## 2020-07-01 RX ORDER — AZITHROMYCIN 250 MG/1
TABLET, FILM COATED ORAL
Qty: 30 TAB | Refills: 0 | Status: SHIPPED | OUTPATIENT
Start: 2020-07-01 | End: 2020-07-31

## 2020-07-01 NOTE — TELEPHONE ENCOUNTER
Patient requesting refill of Juanice Gino on 7/1/2020. Last office visit: 4/16/20  Follow up Visit: due August 2020    Provider is aware of last office visit and follow up. No further action requested from provider.

## 2020-07-29 ENCOUNTER — TELEPHONE (OUTPATIENT)
Dept: PULMONOLOGY | Age: 55
End: 2020-07-29

## 2020-07-29 NOTE — TELEPHONE ENCOUNTER
Called and spoke to patient. Instructed to take Mucinex- 1200 mg in morning and Mucinex -DM at night. Continue Zithromax . Will call back if any change or additional concerns.

## 2020-07-29 NOTE — TELEPHONE ENCOUNTER
Patient calling c/o spitting up blood. She started early July spitting up blood 1 time per day about every week. Yesterday it was 2 times and this am around 6am she spit up blood 6 times. She went to work and has not spit up blood again yet. She states she did take Musinex last pm due to the feeling she has been having of thickness in mucus in her throat. She was in hospital in April for Hemoptysis.  Pt can be reached at #[phone]

## 2020-07-31 DIAGNOSIS — A31.0 MYCOBACTERIUM KANSASII INFECTION (HCC): ICD-10-CM

## 2020-07-31 RX ORDER — AZITHROMYCIN 250 MG/1
TABLET, FILM COATED ORAL
Qty: 30 TAB | Refills: 0 | Status: SHIPPED | OUTPATIENT
Start: 2020-07-31 | End: 2020-08-21 | Stop reason: SDUPTHER

## 2020-08-21 ENCOUNTER — OFFICE VISIT (OUTPATIENT)
Dept: PULMONOLOGY | Age: 55
End: 2020-08-21

## 2020-08-21 VITALS — BODY MASS INDEX: 22.44 KG/M2 | HEIGHT: 66 IN

## 2020-08-21 DIAGNOSIS — A31.0 MYCOBACTERIUM KANSASII INFECTION (HCC): ICD-10-CM

## 2020-08-21 RX ORDER — AZITHROMYCIN 250 MG/1
TABLET, FILM COATED ORAL
Qty: 90 TAB | Refills: 3 | Status: SHIPPED | OUTPATIENT
Start: 2020-08-21 | End: 2021-11-08

## 2020-08-21 NOTE — LETTER
8/21/20 Patient: Jose Paz YOB: 1965 Date of Visit: 8/21/2020 Jonathan Montes MD 
[address] [place] [address] VIA Facsimile: [phone] Dear Jonathan Montes MD, Thank you for referring Ms. Ernie Bolden to [address] [place] [address] for evaluation. My notes for this consultation are attached. If you have questions, please do not hesitate to call me. I look forward to following your patient along with you.  
 
 
Sincerely, 
 
Jose Luis Ferrara MD

## 2020-08-21 NOTE — PROGRESS NOTES
Ariella Elizabeth presents today for   Chief Complaint   Patient presents with    Bronchiectasis     follow up from 4/16/2020    Hemoptysis    Asthma    Other     mycobacterium kansasii infection       Is someone accompanying this pt? No    Is the patient using any DME equipment during OV? No    -DME Company N/A    Depression Screening:  3 most recent PHQ Screens 8/21/2020   Little interest or pleasure in doing things Not at all   Feeling down, depressed, irritable, or hopeless Not at all   Total Score PHQ 2 0       Learning Assessment:  Learning Assessment 7/19/2017   PRIMARY LEARNER Patient   HIGHEST LEVEL OF EDUCATION - PRIMARY LEARNER  SOME COLLEGE   PRIMARY LANGUAGE ENGLISH   LEARNER PREFERENCE PRIMARY READING     DEMONSTRATION     LISTENING   ANSWERED BY patient     bsps   RELATIONSHIP SELF       Abuse Screening:  No flowsheet data found. Fall Risk  No flowsheet data found. Coordination of Care:  1. Have you been to the ER, urgent care clinic since your last visit? Hospitalized since your last visit? No    2. Have you seen or consulted any other health care providers outside of the [address] [place] [address] since your last visit? Include any pap smears or colon screening. Yes.  Dr. Edmond Marley, PCP,  Dr. Niesha Carlos, [address], [facility]

## 2020-08-21 NOTE — PROGRESS NOTES
[facility]  Pulmonary, Critical Care, and Sleep Medicine      Pulmonary Office visit. Name: Kimberlyn Maria     : 1965     Date: 2020        Subjective:     Patient is a 53 y.o. female initially referred for Hemoptysis . Now followed for bronchiectasis. Comes for follow up. Positive sputum culture for AFB- M. [place]ii    20    Patient was admitted in 2020 with increased cough and hemoptysis-sputum AFBs were ordered -resulted with Mycobacterium avium on 2020. She was discharged on Zithromax. I had followed up with a virtual visit thereafter-overall improved with occasional hemoptysis. Her main complaint was related to persistent cough thick mucus in the daytime and difficulty sleeping at night and therefore recommended using mucolytic's in the daytime and cough suppressant at night which she has been doing alternating Mucinex and Delsym with significant improvement in overall symptoms. 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 She reported feeling sick with malaise, aches, chills and anorexia while on meds. Feels much better now after stopping the meds. Using breo. Denies SOB or wheezing- uses rescue inhaler infrequently    HPI:  Patient reports being in good health and about 2 months back had an episode of spitting up blood X4 in an overnight period prompting visit to MD. She was prescribed antibiotics and improved.  Further investigations with CXR and CT scan  Obtained with abnormalities on CT- Pulmonary consulted. She had since not had any further hemoptysis until 3 days back when she coughed up blood tinged mucus. She reports having a need to cough and clear her throat -usually in am. Denies any chronic cough. Denies any wheezing. Never diagnosed with asthma. She has seasonal increased nasal congestion, drainage  And postnasal drip- takes OTC Zrytec. Denies any fever, chills, weight loss. C/o chest pain- after eating certain foods. She has never smoked. No work in any industrial setting. She grew up in [id number] [place] [address] and moved to [place] as an adult. No travel to [place] [address].  She has a dog at home. No h/o childhood respiratory illness- whooping cough. No known TB exposure. Allergies   Allergen Reactions    Percocet [Oxycodone-Acetaminophen] Nausea Only     Current Outpatient Medications   Medication Sig Dispense Refill    TURMERIC PO Take 1 Tab by mouth daily.  azithromycin (ZITHROMAX) 250 mg tablet Take 1 tablet by mouth once daily 90 Tab 3    fluticasone furoate-vilanteroL (Breo Ellipta) 100-25 mcg/dose inhaler INHALE 1 PUFF BY MOUTH ONCE DAILY 3 Inhaler 3    ascorbic acid, vitamin C, (Vitamin C) 500 mg tablet Take 500 mg by mouth daily as needed.  VENTOLIN HFA 90 mcg/actuation inhaler INHALE TWO PUFFS BY MOUTH EVERY 4 HOURS AS NEEDED 1 Inhaler 6    fluticasone (FLONASE ALLERGY RELIEF) 50 mcg/actuation nasal spray 2 Sprays by Both Nostrils route daily as needed.  MULTIVITAMIN PO Take 1 Tab by mouth daily. Gummies two daily      cholecalciferol, vitamin d3, (VITAMIN D) 1,000 unit tablet Take 1,000 Units by mouth daily.        Review of Systems:  HEENT: No epistaxis, no nasal drainage, no difficulty in swallowing, no redness in eyes  Respiratory: as above  Cardiovascular: no chest pain, no palpitations, no chronic leg edema, no syncope  Gastrointestinal: no abd pain, no vomiting, no diarrhea, no bleeding symptoms  Genitourinary: No urinary symptoms or hematuria  Integument/breast: No ulcers or rashes  Musculoskeletal:Neg  Neurological: No focal weakness, no seizures, no headaches  Behvioral/Psych: No anxiety, no depression  Constitutional: No fever, no chills, no weight loss, no night sweats     Objective:     Visit Vitals   5' 6\" (1.676 m)   LMP 10/20/2011   BMI 22.44 kg/m²        Physical Exam:   General: comfortable, no acute distress  HEENT: pupils reactive, sclera anicteric, EOM intact  Neck: No adenopathy or thyroid swelling, no lymphadenopathy or JVD, supple  CVS: S1S2 no murmurs  RS: Mod AE bilaterally , no wheezing no tactile fremitus or egophony, no accessory muscle use  Abd: soft, non tender, no hepatosplenomegaly  Neuro: non focal, awake, alert  Extrm: no leg edema, clubbing or cyanosis  Skin: no rash    Data review:    IgE- WNL  Allergy profile-    D. pteronyssinus <0.10  Class 0 kU/L Final     D. farinae Mite <0.10  Class 0 kU/L Final     Cat Hair/Dander <0.10  Class 0 kU/L Final     Dog Hair/Dander <0.10  Class 0 kU/L Final     Bermuda Grass 4.18 (A) Class IV kU/L Final     Chapin grass 4.19 (A) Class IV kU/L Final     Umair Grass 2.79 (A) Class III kU/L Final     Bahia Grass 6.48 (A) Class IV kU/L Final     W316-OMT COCKROACH, AMERICAN <0.10  Class 0 kU/L Final     Penicillium notatum <0.10  Class 0 kU/L Final     Cladosporium herbarum <0.10  Class 0 kU/L Final     Aspergillus fumigatus <0.10  Class 0 kU/L Final     Mucor racemosus <0.10  Class 0 kU/L Final     Alternaria tenuis <0.10  Class 0 kU/L Final     Stemphylium botryosum <0.10  Class 0 kU/L Final     [id number] COMMON SILVER BIRCH 0.37 (A) Class I kU/L Final     [place] 1.67 (A) Class III kU/L Final     American White Elm 0.30 (A) Class 0/I kU/L Final     Maple/Box Elder 0.38 (A) Class I kU/L Final     White Hickory 0.70 (A) Class II kU/L Final     [id number] MAPLE LEAF [place] 0.20 (A) Class 0/I kU/L Final     White Ramona <0.10  Class 0 kU/L Final     Sweet Gum 0.25 (A) Class 0/I kU/L Final     [place] 0.23 (A) Class 0/I kU/L Final     Ragweed, Short/Common 0.34 (A) Class I kU/L Final     Mugwort 0.19 (A) Class 0/I kU/L Final     Plantain, English 0.23 (A) Class 0/I kU/L Final     Pigweed, Rough 0.26 (A) Class 0/I kU/L Final     Sheep Sorrel (Dock) 0.31 (A) Class 0/I kU/L Final     Nettle 0.14 (A) Class 0/I kU/L Final       Narrative      4/27/2018 11:37 AM - [facility], [facility] Lab Results In       Component Results      Component     AFB SPECIMEN PROCESSING     Concentration     ACID FAST SMEAR     Negative     ACID FAST CULTURE (Abnormal)     Positive   Acid-fast bacilli have been detected in culture at 2 weeks; see AFB        Comment:     5/1/2020 10:35 PM - [facility], [facility]     Component  Value  Ref Range & Units  Status    Source  SPUTUM     Final    M. tuberculosis complex  Negative     Final    (NOTE)   Performed At: [address] [id number]   Aldair Kaur MD [id number]    M. avium complex  PositiveAbnormal       Final    M. kansasii  Not Indicated   Final    M. gordonae  Not Indicated   Final    Other  TEST NOT PERFORMED     Final    (NOTE)    5/1/2020 10:36 PM - [facility], [facility]     Component  Value  Ref Range & Units  Status    Source  SPUTUM     Final    Organism ID  CommentAbnormal       Final    (NOTE)   Mycobacterium avium complex    Amikacin  8.0 ug/mL   Final    Ciprofloxacin  >16.0 ug/mL   Final    Clarithromycin  2.0 ug/mL Susceptible   Final    Ethambutol  4.0 ug/mL   Final    Linezolid  32.0 ug/mL   Final    Moxifloxacin  4.0 ug/mL   Final    Rifampin  4.0 ug/mL   Final    Streptomycin  16.0 ug/mL   Final    (NOTE)          Imaging:  I have personally reviewed the patients radiographs and have reviewed the reports and images:  CXR Results  (Last 48 hours)    None        CT Results (most recent):  Results from Hospital Encounter encounter on 04/03/20   CT CHEST WO CONT    Narrative CT chest without enhancement     INDICATION: Right upper lobe nodule on radiograph. TECHNIQUE: 5 mm collimation axial images obtained from the thoracic inlet to the  level of the diaphragm without administration of low osmolar, nonionic  intravenous contrast.    Dose reduction techniques used: Automated exposure control, adjustment of the  mAs and/or kVp according to patient size, standardized low-dose protocol, and/or  iterative reconstruction technique. COMPARISON: Same day radiograph. CHEST FINDINGS:     Lymph nodes: No adenopathy by CT size criteria. Thyroid: Unremarkable. Mediastinum: Heart size is normal. No pericardial effusion. Minimal  atherosclerotic disease. Esophagus is underdistended. Lungs: There is extensive pleural parenchymal scarring in the lung apices, right  greater than left. There is some nodular right apical presumed scarring with a 9  x 10 mm nodule on axial image 8. There are smaller areas of nodularity as well. There is a small amount of groundglass in the right middle lobe. There is volume  loss and bronchiectasis in the medial aspect of the right middle lobe. Small  amount of groundglass in the inferior right upper lobe as well. No pleural  effusion. ABDOMEN:     No acute finding. Bones: Degenerative changes of the spine. Impression Impression:    Patchy groundglass in the right middle lobe and in a small portion of the  adjacent right upper lobe. Findings likely represent multifocal infection. No  dense consolidation. 3 month follow-up chest CT is advised to ensure resolution. Nodular pleural parenchymal scarring in the lung apices is seen. The largest  nodular region measures 10 x 9 mm. Recommend attention on 3 month follow-up to  ensure stability as early lung neoplasm is not entirely excluded but considered  less likely.     Focal region of volume loss in the medial aspect of the right middle lobe with  associated bronchiectasis. This is likely a chronic process. Recommend attention  on follow-up. CT scan of chest ( [facility]) reviewed images- biapical pleural thickening, apical parenchymal scarring and RML bronchiectatic changes with scattered infiltrates, RUL bronchiectatic changes     IMPRESSION:   · Bronchiectasis- recent recurring hemoptysis- ? Superinfection with atypical mycobacteria. Cultures isolated Ailyn Sanchez so initiated treatment but patient developed significant systemic symptoms of fatigue, malaise, anorexia, flu like aches and fevers/chills with > 5 fold increase in LFT's - likely all rifampin related so instructed to stop and has since recovered. · Mycobacterium avium complex-on most recent cultures-pansensitive  ·  Lower respiratory tract infection:Predominant location in RML with some RUL distribution and biapical pleuroparenchymal infiltrates/fibrosis suggests a sequelae of remote inflammatory/granulomatous process. · CT scan (2016) with some scattered areas of infiltrates-Secondary to above  · Hemoptysis secondary to above  · Reactive airways disease- allergic vs secondary to above. 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 Repeat 10/2017- normal range.       RECOMMENDATIONS:     · Continue Azithromycin- will change to 3 times a week for chronic suppressive treatment  · Can consider additional treatment options if continues to remain with symptoms and/or hemoptysis recurs  · Will check HRCT and sputum for cultures and AFB periodically as indicted  · Continue  Breo-ellipta , prn albuterol  · Instructed to humidify ambient atmosphere- cool mist humidifier  · Discussed airway clearance measures with patient  · Continue with Mucinex in daytime and Delsym at night  · Will consider adding hypertonic saline if mucus clearance remains an issue  · Discussed strict allergen avoidance- grasses, trees. · Encouraged use of mask if outdoors in spring/fall and during the [place] pandemic. · In view of her high risk status it would be preferable if she works from home  · Continue following social distancing and safe practices  · Strict GERD precautions and add PPI  · Discussed at length diagnosis and treatment plan- written instructions given. · Discussed vigilance and checking out for infectious /allergic triggers in work space  · Discussed indications for bronchoscopy-if has hemoptysis, difficulty with mucus clearance or if further cultures needed  · Preventive vaccinations- Pneumovax PPSV23 up to date  ·  Influenza vaccine- recieved  · Will follow up        Health maintenance screens deferred to Primary care provider.      Tanja Reed MD

## 2021-02-18 ENCOUNTER — TRANSCRIBE ORDER (OUTPATIENT)
Dept: SCHEDULING | Age: 56
End: 2021-02-18

## 2021-02-18 DIAGNOSIS — Z12.31 VISIT FOR SCREENING MAMMOGRAM: Primary | ICD-10-CM

## 2021-03-18 NOTE — TELEPHONE ENCOUNTER
Please put on cancellation list and watch for approval The pt. States that she has had several days of chest congestion with prod. of clear sputum. Occ. There is a streak of light yellow on rare occasion. She used OTC cough meds with effect. She is using a humidifier. The pt. Is reassured that she doesn't need an antibiotic at this time. Continue her OTC meds for cold symptoms. Continue the humidifier secondary to heat being run. Call back if, her sputum should turn color or any hemoptysis.

## 2021-04-05 RX ORDER — FLUTICASONE FUROATE AND VILANTEROL TRIFENATATE 100; 25 UG/1; UG/1
POWDER RESPIRATORY (INHALATION)
Qty: 3 INHALER | Refills: 0 | Status: SHIPPED | OUTPATIENT
Start: 2021-04-05 | End: 2021-04-08 | Stop reason: SDUPTHER

## 2021-04-08 ENCOUNTER — TELEPHONE (OUTPATIENT)
Dept: PULMONOLOGY | Age: 56
End: 2021-04-08

## 2021-04-08 RX ORDER — FLUTICASONE FUROATE AND VILANTEROL TRIFENATATE 100; 25 UG/1; UG/1
POWDER RESPIRATORY (INHALATION)
Qty: 2 INHALER | Refills: 0 | Status: SHIPPED | COMMUNITY
Start: 2021-04-08 | End: 2021-09-02 | Stop reason: SDUPTHER

## 2021-04-08 NOTE — TELEPHONE ENCOUNTER
Pt wanting to know if we have any coupons for [facility]. Please advise [phone].

## 2021-04-08 NOTE — TELEPHONE ENCOUNTER
Patient has been using [employer] coupons. Last month was the end of her year with the coupon and the new coupons are not active any longer. Her insurance states the rx will be over $100. She is not sure if that is due to deductible or not. Patient has appt June and will discuss with doctor at that time. Will provide samples until that time

## 2021-04-24 ENCOUNTER — HOSPITAL ENCOUNTER (OUTPATIENT)
Dept: MAMMOGRAPHY | Age: 56
Discharge: HOME | End: 2021-04-24
Attending: OBSTETRICS & GYNECOLOGY

## 2021-04-24 DIAGNOSIS — Z12.31 VISIT FOR SCREENING MAMMOGRAM: ICD-10-CM

## 2021-05-24 ENCOUNTER — OFFICE VISIT (OUTPATIENT)
Dept: PULMONOLOGY | Age: 56
End: 2021-05-24
Payer: COMMERCIAL

## 2021-05-24 VITALS
TEMPERATURE: 98.3 F | RESPIRATION RATE: 16 BRPM | SYSTOLIC BLOOD PRESSURE: 106 MMHG | WEIGHT: 124.8 LBS | HEART RATE: 69 BPM | DIASTOLIC BLOOD PRESSURE: 66 MMHG | OXYGEN SATURATION: 97 % | HEIGHT: 66 IN | BODY MASS INDEX: 20.06 KG/M2

## 2021-05-24 DIAGNOSIS — J45.20 MILD INTERMITTENT ASTHMA WITHOUT COMPLICATION: ICD-10-CM

## 2021-05-24 DIAGNOSIS — J47.9 BRONCHIECTASIS WITHOUT COMPLICATION (HCC): Primary | ICD-10-CM

## 2021-05-24 DIAGNOSIS — A31.0 MYCOBACTERIUM KANSASII INFECTION (HCC): ICD-10-CM

## 2021-05-24 PROCEDURE — 99214 OFFICE O/P EST MOD 30 MIN: CPT | Performed by: INTERNAL MEDICINE

## 2021-05-24 RX ORDER — FLUTICASONE FUROATE AND VILANTEROL TRIFENATATE 100; 25 UG/1; UG/1
1 POWDER RESPIRATORY (INHALATION) DAILY
Qty: 2 INHALER | Refills: 0 | Status: SHIPPED | COMMUNITY
Start: 2021-05-24 | End: 2021-09-02 | Stop reason: SDUPTHER

## 2021-05-24 NOTE — PROGRESS NOTES
Melony Flaherty presents today for   Chief Complaint   Patient presents with    Hemoptysis     one time, bright red mixed with Georgina Radha  4/22/21        Is someone accompanying this pt? No    Is the patient using any DME equipment during OV? No    -DME Company NA    Depression Screening:  3 most recent PHQ Screens 8/21/2020   Little interest or pleasure in doing things Not at all   Feeling down, depressed, irritable, or hopeless Not at all   Total Score PHQ 2 0       Learning Assessment:  Learning Assessment 7/19/2017   PRIMARY LEARNER Patient   HIGHEST LEVEL OF EDUCATION - PRIMARY LEARNER  SOME COLLEGE   PRIMARY LANGUAGE ENGLISH   LEARNER PREFERENCE PRIMARY READING     DEMONSTRATION     LISTENING   ANSWERED BY patient     bsps   RELATIONSHIP SELF       Abuse Screening:  No flowsheet data found. Fall Risk  No flowsheet data found. Coordination of Care:  1. Have you been to the ER, urgent care clinic since your last visit? Hospitalized since your last visit? No    2. Have you seen or consulted any other health care providers outside of the [address] [place] [address] since your last visit? Include any pap smears or colon screening.  No

## 2021-05-24 NOTE — LETTER
5/24/2021    Patient: Davonte Chapman   YOB: 1965   Date of Visit: 5/24/2021     Colette Sawyer MD  [facility] [id number]  Via Fax: [phone]    Dear Colette Sawyer MD,      Thank you for referring Ms. Davonte Chapman to [address] [place] [address] for evaluation. My notes for this consultation are attached. If you have questions, please do not hesitate to call me. I look forward to following your patient along with you.       Sincerely,    Domingo Mendoza MD

## 2021-05-24 NOTE — PROGRESS NOTES
[facility]  Pulmonary, Critical Care, and Sleep Medicine      Pulmonary Office visit. Name: Sana Zheng     : 1965     Date: 2021        Subjective:     Patient is a 63 y.o. female initially referred for Hemoptysis . Now followed for bronchiectasis. Comes for follow up. Positive sputum culture for AFB- M. [place]ii/ MEGHANA    21      Patient was admitted in 2020 with increased cough and hemoptysis-sputum AFBs were ordered -resulted with Mycobacterium avium on 2020. She was discharged on Zithromax. Patient reports intermittent episodes of hemoptysis anytime she is stressed  Currently she is taking care of her mother who is in hospice, also planning her wedding and it is getting overwhelming  She is scheduled for a upper endoscopy for symptoms -constant stomach discomfort  Denies any fever or chills  Denies any night sweats  Has gone back to work     Taking daily azithromycin ( suppressive treatment) and tolerating well with symptom control  Was started on Rifampin and Ethambutol for Napolean Marielos- LFT's noted to be abnormal >5 fold so instructed to stop meds. She reported feeling sick with malaise, aches, chills and anorexia while on meds. Feels much better now after stopping the meds. Using breo. Denies SOB or wheezing- uses rescue inhaler infrequently    HPI:  Patient reports being in good health and about 2 months back had an episode of spitting up blood X4 in an overnight period prompting visit to MD. She was prescribed antibiotics and improved. Further investigations with CXR and CT scan  Obtained with abnormalities on CT- Pulmonary consulted. She had since not had any further hemoptysis until 3 days back when she coughed up blood tinged mucus. She reports having a need to cough and clear her throat -usually in am. Denies any chronic cough. Denies any wheezing. Never diagnosed with asthma.   She has seasonal increased nasal congestion, drainage  And postnasal drip- takes OTC Zrytec. Denies any fever, chills, weight loss. C/o chest pain- after eating certain foods. She has never smoked. No work in any industrial setting. She grew up in [address] [place] [address] and moved to [place] as an adult. No travel to [place] [id number].  She has a dog at home. No h/o childhood respiratory illness- whooping cough. No known TB exposure. Allergies   Allergen Reactions    Percocet [Oxycodone-Acetaminophen] Nausea Only     Current Outpatient Medications   Medication Sig Dispense Refill    fluticasone furoate-vilanteroL (Breo Ellipta) 100-25 mcg/dose inhaler INHALE 1 PUFF BY MOUTH ONCE DAILY 2 Inhaler 0    valACYclovir (VALTREX) 500 mg tablet TAKE 1 TABLET BY MOUTH ONCE DAILY FOR 90 DAYS      azithromycin (ZITHROMAX) 250 mg tablet Take 1 tablet by mouth once daily 90 Tab 3    VENTOLIN HFA 90 mcg/actuation inhaler INHALE TWO PUFFS BY MOUTH EVERY 4 HOURS AS NEEDED 1 Inhaler 6    fluticasone (FLONASE ALLERGY RELIEF) 50 mcg/actuation nasal spray 2 Sprays by Both Nostrils route daily as needed.  ascorbic acid, vitamin C, (Vitamin C) 500 mg tablet Take 500 mg by mouth daily as needed. (Patient not taking: Reported on 5/24/2021)      MULTIVITAMIN PO Take 1 Tab by mouth daily. Gummies two daily (Patient not taking: Reported on 5/24/2021)      cholecalciferol, vitamin d3, (VITAMIN D) 1,000 unit tablet Take 1,000 Units by mouth daily.  (Patient not taking: Reported on 5/24/2021)       Review of Systems:  HEENT: No epistaxis, no nasal drainage, no difficulty in swallowing, no redness in eyes  Respiratory: as above  Cardiovascular: no chest pain, no palpitations, no chronic leg edema, no syncope  Gastrointestinal: no abd pain, no vomiting, no diarrhea, no bleeding symptoms  Genitourinary: No urinary symptoms or hematuria  Integument/breast: No ulcers or rashes  Musculoskeletal:Neg  Neurological: No focal weakness, no seizures, no headaches  Behvioral/Psych: No anxiety, no depression  Constitutional: No fever, no chills, no weight loss, no night sweats     Objective:     Visit Vitals  /66 (BP 1 Location: Left upper arm, BP Patient Position: Sitting, BP Cuff Size: Large adult)   Pulse 69   Temp 98.3 °F (36.8 °C) (Oral)   Resp 16   Ht 5' 6\" (1.676 m)   Wt 56.6 kg (124 lb 12.8 oz)   LMP 10/20/2011   SpO2 97% Comment: RA Rest   BMI 20.14 kg/m²        Physical Exam:   General: comfortable, no acute distress  HEENT: pupils reactive, sclera anicteric, EOM intact  Neck: No adenopathy or thyroid swelling, no lymphadenopathy or JVD, supple  CVS: S1S2 no murmurs  RS: Mod AE bilaterally , no wheezing no tactile fremitus or egophony, no accessory muscle use  Abd: soft, non tender, no hepatosplenomegaly  Neuro: non focal, awake, alert  Extrm: no leg edema, clubbing or cyanosis  Skin: no rash    Data review:    IgE- WNL  Allergy profile-    D. pteronyssinus <0.10  Class 0 kU/L Final     D. farinae Mite <0.10  Class 0 kU/L Final     Cat Hair/Dander <0.10  Class 0 kU/L Final     Dog Hair/Dander <0.10  Class 0 kU/L Final     [place] Grass 4.18 (A) Class IV kU/L Final     [place] grass 4.19 (A) Class IV kU/L Final     [place] Grass 2.79 (A) Class III kU/L Final     [place] Grass 6.48 (A) Class IV kU/L Final     B578-[id number] COCKROACH, AMERICAN <0.10  Class 0 kU/L Final     Penicillium notatum <0.10  Class 0 kU/L Final     Cladosporium herbarum <0.10  Class 0 kU/L Final     Aspergillus fumigatus <0.10  Class 0 kU/L Final     Mucor racemosus <0.10  Class 0 kU/L Final     Alternaria tenuis <0.10  Class 0 kU/L Final     Stemphylium botryosum <0.10  Class 0 kU/L Final     [id number] COMMON SILVER BIRCH 0.37 (A) Class I kU/L Final     [place] 1.67 (A) Class III kU/L Final     American White Elm 0.30 (A) Class 0/I kU/L Final     Maple/[place] 0.38 (A) Class I kU/L Final     White [place] 0.70 (A) Class II kU/L Final     [id number] MAPLE LEAF [place] 0.20 (A) Class 0/I kU/L Final     White [place] <0.10  Class 0 kU/L Final     Sweet Gum 0.25 (A) Class 0/I kU/L Final     Mountain [place] 0.23 (A) Class 0/I kU/L Final     Ragweed, Short/Common 0.34 (A) Class I kU/L Final     Mugwort 0.19 (A) Class 0/I kU/L Final     Plantain, English 0.23 (A) Class 0/I kU/L Final     Pigweed, Rough 0.26 (A) Class 0/I kU/L Final     Sheep Sorrel ([place]) 0.31 (A) Class 0/I kU/L Final     Nettle 0.14 (A) Class 0/I kU/L Final       Narrative      4/27/2018 11:37 AM - [place], [facility] Lab Results In       Component Results      Component     AFB SPECIMEN PROCESSING     Concentration     ACID FAST SMEAR     Negative     ACID FAST CULTURE (Abnormal)     Positive   Acid-fast bacilli have been detected in culture at 2 weeks; see AFB        Comment:     5/1/2020 10:35 PM - [place], [facility]     Component  Value  Ref Range & Units  Status    Source  SPUTUM     Final    M. tuberculosis complex  Negative     Final    (NOTE)   Performed At: [address] [id number]   Paula Lofton MD [id number]    M. avium complex  PositiveAbnormal       Final    M. kansasii  Not Indicated   Final    M. gordonae  Not Indicated   Final    Other  TEST NOT PERFORMED     Final    (NOTE)    5/1/2020 10:36 PM - [facility], [facility]     Component  Value  Ref Range & Units  Status    Source  SPUTUM     Final    Organism ID  CommentAbnormal       Final    (NOTE)   Mycobacterium avium complex    Amikacin  8.0 ug/mL   Final    Ciprofloxacin  >16.0 ug/mL   Final    Clarithromycin  2.0 ug/mL Susceptible   Final    Ethambutol  4.0 ug/mL   Final    Linezolid  32.0 ug/mL   Final    Moxifloxacin  4.0 ug/mL   Final    Rifampin  4.0 ug/mL   Final    Streptomycin  16.0 ug/mL   Final    (NOTE)          Imaging:  I have personally reviewed the patients radiographs and have reviewed the reports and images:  CXR Results  (Last 48 hours)    None        CT Results (most recent):  Results from Hospital Encounter encounter on 04/03/20    CT CHEST WO CONT    Narrative  CT chest without enhancement    INDICATION: Right upper lobe nodule on radiograph. TECHNIQUE: 5 mm collimation axial images obtained from the thoracic inlet to the  level of the diaphragm without administration of low osmolar, nonionic  intravenous contrast.    Dose reduction techniques used: Automated exposure control, adjustment of the  mAs and/or kVp according to patient size, standardized low-dose protocol, and/or  iterative reconstruction technique. COMPARISON: Same day radiograph. CHEST FINDINGS:    Lymph nodes: No adenopathy by CT size criteria. Thyroid: Unremarkable. Mediastinum: Heart size is normal. No pericardial effusion. Minimal  atherosclerotic disease. Esophagus is underdistended. Lungs: There is extensive pleural parenchymal scarring in the lung apices, right  greater than left. There is some nodular right apical presumed scarring with a 9  x 10 mm nodule on axial image 8. There are smaller areas of nodularity as well. There is a small amount of groundglass in the right middle lobe. There is volume  loss and bronchiectasis in the medial aspect of the right middle lobe. Small  amount of groundglass in the inferior right upper lobe as well. No pleural  effusion. ABDOMEN:    No acute finding. Bones: Degenerative changes of the spine. Impression  Impression:    Patchy groundglass in the right middle lobe and in a small portion of the  adjacent right upper lobe. Findings likely represent multifocal infection. No  dense consolidation. 3 month follow-up chest CT is advised to ensure resolution. Nodular pleural parenchymal scarring in the lung apices is seen. The largest  nodular region measures 10 x 9 mm. Recommend attention on 3 month follow-up to  ensure stability as early lung neoplasm is not entirely excluded but considered  less likely. Focal region of volume loss in the medial aspect of the right middle lobe with  associated bronchiectasis.  This is likely a chronic process. Recommend attention  on follow-up. CT scan of chest ( [facility]) reviewed images- biapical pleural thickening, apical parenchymal scarring and RML bronchiectatic changes with scattered infiltrates, RUL bronchiectatic changes     IMPRESSION:   · Bronchiectasis- recent recurring hemoptysis- ? Superinfection with atypical mycobacteria. Cultures isolated Lily Oppenheim so initiated treatment but patient developed significant systemic symptoms of fatigue, malaise, anorexia, flu like aches and fevers/chills with > 5 fold increase in LFT's - likely all rifampin related so instructed to stop and has since recovered. · Mycobacterium avium complex-on most recent cultures-pansensitive  · Lower respiratory tract infection:Predominant location in RML with some RUL distribution and biapical pleuroparenchymal infiltrates/fibrosis suggests a sequelae of remote inflammatory/granulomatous process. · CT scan (2016) with some scattered areas of infiltrates-Secondary to above  · Hemoptysis secondary to above  · Reactive airways disease- allergic vs secondary to above. 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 Repeat 10/2017- normal range. RECOMMENDATIONS:     · Continue Azithromycin-  3 times a week for chronic suppressive treatment.   I told the patient to hold azithromycin should there be findings of significant gastritis on her endoscopy  · Can consider additional treatment options if continues to remain with symptoms and/or hemoptysis recurs  · Will check HRCT and sputum for cultures and AFB periodically as indicted  · Continue  Breo-ellipta , prn albuterol  · Instructed to humidify ambient atmosphere- cool mist humidifier  · Discussed airway clearance measures with patient  · Continue with Mucinex in daytime and Delsym at night  · Will consider adding hypertonic saline if mucus clearance remains an issue  · Discussed strict allergen avoidance- grasses, trees. · Strict GERD precautions and add [id number] with GI  · Discussed at length diagnosis and treatment plan- written instructions given. · Discussed indications for bronchoscopy-if has hemoptysis, difficulty with mucus clearance or if further cultures needed  · Preventive vaccinations- Pneumovax PPSV23 , COVID-19 vaccine up to date  · Will follow up 3 months       Health maintenance screens deferred to Primary care provider.      Mark Mcclure MD

## 2021-06-04 ENCOUNTER — HOSPITAL ENCOUNTER (OUTPATIENT)
Dept: MAMMOGRAPHY | Age: 56
Discharge: HOME | End: 2021-06-04
Attending: OBSTETRICS & GYNECOLOGY
Payer: COMMERCIAL

## 2021-06-04 PROCEDURE — 77063 BREAST TOMOSYNTHESIS BI: CPT

## 2021-06-17 ENCOUNTER — TELEPHONE (OUTPATIENT)
Dept: PULMONOLOGY | Age: 56
End: 2021-06-17

## 2021-06-17 NOTE — TELEPHONE ENCOUNTER
Pt states at her last visit she told Dr. Jaren Carpenter her medication no longer has coupons and is too expensive. Has been waiting on a new medication, requesting update. Please advise [phone].

## 2021-06-17 NOTE — TELEPHONE ENCOUNTER
Patient states the [employer] is $100 but not sure if she is paying into a deductible. She is going to call her [employer] plan to check and also run the other med's in that catagory by them for pricing. 

## 2021-08-05 ENCOUNTER — OFFICE VISIT (OUTPATIENT)
Dept: PULMONOLOGY | Age: 56
End: 2021-08-05
Payer: COMMERCIAL

## 2021-08-05 VITALS
BODY MASS INDEX: 19.93 KG/M2 | HEIGHT: 66 IN | RESPIRATION RATE: 16 BRPM | OXYGEN SATURATION: 99 % | DIASTOLIC BLOOD PRESSURE: 69 MMHG | SYSTOLIC BLOOD PRESSURE: 117 MMHG | TEMPERATURE: 98.5 F | HEART RATE: 66 BPM | WEIGHT: 124 LBS

## 2021-08-05 DIAGNOSIS — J47.9 BRONCHIECTASIS WITHOUT COMPLICATION (HCC): ICD-10-CM

## 2021-08-05 DIAGNOSIS — A31.0 MYCOBACTERIUM KANSASII INFECTION (HCC): Primary | ICD-10-CM

## 2021-08-05 DIAGNOSIS — R04.2 HEMOPTYSIS: ICD-10-CM

## 2021-08-05 PROCEDURE — 99214 OFFICE O/P EST MOD 30 MIN: CPT | Performed by: INTERNAL MEDICINE

## 2021-08-05 NOTE — PROGRESS NOTES
[facility]  Pulmonary, Critical Care, and Sleep Medicine      Pulmonary Office visit. Name: Felipa Balderas     : 1965     Date: 2021        Subjective:     Patient is a 63 y.o. female initially referred for Hemoptysis . Now followed for bronchiectasis. Comes for follow up. Positive sputum culture for AFB- [facility]/ [facility]    21    Since her last visit in May patient states that she lost her mother in  and has been significantly stressed. In addition she is planning a wedding and has been busy. She uses her medications regularly but has noticed at least 4 episodes of coughing up bright red blood and having more mucus with productive expectoration. She has been doing her Zithromax daily uses Breo, and needs to use her Delsym frequently  Denies fever chills or night sweats  Denies any loss of appetite  Taking daily azithromycin ( suppressive treatment) and tolerating well with symptom control  Was started on Rifampin and Ethambutol for Royce Vail- LFT's noted to be abnormal >5 fold so instructed to stop meds. She reported feeling sick with malaise, aches, chills and anorexia while on meds. Using breo. Denies SOB or wheezing- uses rescue inhaler infrequently  Background information  Patient was admitted in 2020 with increased cough and hemoptysis-sputum AFBs were ordered -resulted with Mycobacterium avium on 2020. She was discharged on Zithromax. Patient reports intermittent episodes of hemoptysis anytime she is stressed      HPI:  Patient reports being in good health and about 2 months back had an episode of spitting up blood X4 in an overnight period prompting visit to MD. She was prescribed antibiotics and improved. Further investigations with CXR and CT scan  Obtained with abnormalities on CT- Pulmonary consulted. She had since not had any further hemoptysis until 3 days back when she coughed up blood tinged mucus.  She reports having a need to cough and clear her throat -usually in am. Denies any chronic cough. Denies any wheezing. Never diagnosed with asthma. She has seasonal increased nasal congestion, drainage  And postnasal drip- takes OTC Zrytec. Denies any fever, chills, weight loss. C/o chest pain- after eating certain foods. She has never smoked. No work in any industrial setting. She grew up in [id number] [place] [address] and moved to [place] as an adult. No travel to [place] [address].  She has a dog at home. No h/o childhood respiratory illness- whooping cough. No known TB exposure. Allergies   Allergen Reactions    Percocet [Oxycodone-Acetaminophen] Nausea Only     Current Outpatient Medications   Medication Sig Dispense Refill    fluticasone furoate-vilanteroL (Breo Ellipta) 100-25 mcg/dose inhaler Take 1 Puff by inhalation daily. 2 Inhaler 0    valACYclovir (VALTREX) 500 mg tablet TAKE 1 TABLET BY MOUTH ONCE DAILY FOR 90 DAYS      azithromycin (ZITHROMAX) 250 mg tablet Take 1 tablet by mouth once daily 90 Tab 3    VENTOLIN HFA 90 mcg/actuation inhaler INHALE TWO PUFFS BY MOUTH EVERY 4 HOURS AS NEEDED 1 Inhaler 6    MULTIVITAMIN PO Take 1 Tablet by mouth daily. Gummies two daily      fluticasone furoate-vilanteroL (Breo Ellipta) 100-25 mcg/dose inhaler INHALE 1 PUFF BY MOUTH ONCE DAILY 2 Inhaler 0    ascorbic acid, vitamin C, (Vitamin C) 500 mg tablet Take 500 mg by mouth daily as needed. (Patient not taking: Reported on 5/24/2021)      fluticasone (FLONASE ALLERGY RELIEF) 50 mcg/actuation nasal spray 2 Sprays by Both Nostrils route daily as needed. (Patient not taking: Reported on 8/5/2021)      cholecalciferol, vitamin d3, (VITAMIN D) 1,000 unit tablet Take 1,000 Units by mouth daily.  (Patient not taking: Reported on 5/24/2021)       Review of Systems:  HEENT: No epistaxis, no nasal drainage, no difficulty in swallowing, no redness in eyes  Respiratory: as above  Cardiovascular: no chest pain, no palpitations, no chronic leg edema, no syncope  Gastrointestinal: no abd pain, no vomiting, no diarrhea, no bleeding symptoms  Genitourinary: No urinary symptoms or hematuria  Integument/breast: No ulcers or rashes  Musculoskeletal:Neg  Neurological: No focal weakness, no seizures, no headaches  Behvioral/Psych: No anxiety, no depression  Constitutional: No fever, no chills, no weight loss, no night sweats     Objective:     Visit Vitals  /69 (BP 1 Location: Left upper arm, BP Patient Position: Sitting, BP Cuff Size: Large adult)   Pulse 66   Temp 98.5 °F (36.9 °C) (Oral)   Resp 16   Ht 5' 6\" (1.676 m)   Wt 56.2 kg (124 lb)   LMP 10/20/2011   SpO2 99% Comment: RA Rest   BMI 20.01 kg/m²        Physical Exam:   General: comfortable, no acute distress  HEENT: pupils reactive, sclera anicteric, EOM intact  Neck: No adenopathy or thyroid swelling, no lymphadenopathy or JVD, supple  CVS: S1S2 no murmurs  RS: Mod AE bilaterally , no wheezing no tactile fremitus or egophony, no accessory muscle use  Abd: soft, non tender, no hepatosplenomegaly  Neuro: non focal, awake, alert  Extrm: no leg edema, clubbing or cyanosis  Skin: no rash    Data review:    IgE- WNL  Allergy profile-    D. pteronyssinus <0.10  Class 0 kU/L Final     D. farinae Mite <0.10  Class 0 kU/L Final     Cat Hair/Dander <0.10  Class 0 kU/L Final     Dog Hair/Dander <0.10  Class 0 kU/L Final     Bermuda Grass 4.18 (A) Class IV kU/L Final     Chapin grass 4.19 (A) Class IV kU/L Final     Umair Grass 2.79 (A) Class III kU/L Final     Bahia Grass 6.48 (A) Class IV kU/L Final     Z884-[id number] COCKROACH, AMERICAN <0.10  Class 0 kU/L Final     Penicillium notatum <0.10  Class 0 kU/L Final     Cladosporium herbarum <0.10  Class 0 kU/L Final     Aspergillus fumigatus <0.10  Class 0 kU/L Final     Mucor racemosus <0.10  Class 0 kU/L Final     Alternaria tenuis <0.10  Class 0 kU/L Final     Stemphylium botryosum <0.10  Class 0 kU/L Final     A342-ICP COMMON SILVER BIRCH 0.37 (A) Class I kU/L Final     [place] 1.67 (A) Class III kU/L Final     American White Elm 0.30 (A) Class 0/I kU/L Final     Maple/Box Elder 0.38 (A) Class I kU/L Final     White Hickory 0.70 (A) Class II kU/L Final     A303-PGT MAPLE LEAF [place] 0.20 (A) Class 0/I kU/L Final     White [place] <0.10  Class 0 kU/L Final     Sweet Gum 0.25 (A) Class 0/I kU/L Final     Mountain Harmon 0.23 (A) Class 0/I kU/L Final     Ragweed, Short/Common 0.34 (A) Class I kU/L Final     Mugwort 0.19 (A) Class 0/I kU/L Final     Plantain, English 0.23 (A) Class 0/I kU/L Final     Pigweed, Rough 0.26 (A) Class 0/I kU/L Final     Sheep Sorrel (Dock) 0.31 (A) Class 0/I kU/L Final     Nettle 0.14 (A) Class 0/I kU/L Final       Narrative      4/27/2018 11:37 AM - [facility], [facility] Lab Results In       Component Results      Component     AFB SPECIMEN PROCESSING     Concentration     ACID FAST SMEAR     Negative     ACID FAST CULTURE (Abnormal)     Positive   Acid-fast bacilli have been detected in culture at 2 weeks; see AFB        Comment:     5/1/2020 10:35 PM - [facility], [facility]     Component  Value  Ref Range & Units  Status    Source  SPUTUM     Final    M. tuberculosis complex  Negative     Final    (NOTE)   Performed At: [address] [id number]   Severiano Medina MD CI:[id number]    M. avium complex  PositiveAbnormal       Final    M. kansasii  Not Indicated   Final    M. gordonae  Not Indicated   Final    Other  TEST NOT PERFORMED     Final    (NOTE)    5/1/2020 10:36 PM - [facility], [facility]     Component  Value  Ref Range & Units  Status    Source  SPUTUM     Final    Organism ID  CommentAbnormal       Final    (NOTE)   Mycobacterium avium complex    Amikacin  8.0 ug/mL   Final    Ciprofloxacin  >16.0 ug/mL   Final    Clarithromycin  2.0 ug/mL Susceptible   Final    Ethambutol  4.0 ug/mL   Final    Linezolid  32.0 ug/mL   Final    Moxifloxacin  4.0 ug/mL   Final    Rifampin  4.0 ug/mL   Final    Streptomycin  16.0 ug/mL   Final    (NOTE)          Imaging:  I have personally reviewed the patients radiographs and have reviewed the reports and images:  CXR Results  (Last 48 hours)    None        CT Results (most recent):  Results from Hospital Encounter encounter on 04/03/20    CT CHEST WO CONT    Narrative  CT chest without enhancement    INDICATION: Right upper lobe nodule on radiograph. TECHNIQUE: 5 mm collimation axial images obtained from the thoracic inlet to the  level of the diaphragm without administration of low osmolar, nonionic  intravenous contrast.    Dose reduction techniques used: Automated exposure control, adjustment of the  mAs and/or kVp according to patient size, standardized low-dose protocol, and/or  iterative reconstruction technique. COMPARISON: Same day radiograph. CHEST FINDINGS:    Lymph nodes: No adenopathy by CT size criteria. Thyroid: Unremarkable. Mediastinum: Heart size is normal. No pericardial effusion. Minimal  atherosclerotic disease. Esophagus is underdistended. Lungs: There is extensive pleural parenchymal scarring in the lung apices, right  greater than left. There is some nodular right apical presumed scarring with a 9  x 10 mm nodule on axial image 8. There are smaller areas of nodularity as well. There is a small amount of groundglass in the right middle lobe. There is volume  loss and bronchiectasis in the medial aspect of the right middle lobe. Small  amount of groundglass in the inferior right upper lobe as well. No pleural  effusion. ABDOMEN:    No acute finding. Bones: Degenerative changes of the spine. Impression  Impression:    Patchy groundglass in the right middle lobe and in a small portion of the  adjacent right upper lobe. Findings likely represent multifocal infection. No  dense consolidation. 3 month follow-up chest CT is advised to ensure resolution. Nodular pleural parenchymal scarring in the lung apices is seen.  The largest  nodular region measures 10 x 9 mm. Recommend attention on 3 month follow-up to  ensure stability as early lung neoplasm is not entirely excluded but considered  less likely. Focal region of volume loss in the medial aspect of the right middle lobe with  associated bronchiectasis. This is likely a chronic process. Recommend attention  on follow-up. CT scan of [facility] ( [facility] reviewed images- biapical pleural thickening, apical parenchymal scarring and RML bronchiectatic changes with scattered infiltrates, RUL bronchiectatic changes     IMPRESSION:   · Bronchiectasis- recent recurring hemoptysis- ? Superinfection with atypical mycobacteria. Cultures isolated Adra Sergeant so initiated treatment but patient developed significant systemic symptoms of fatigue, malaise, anorexia, flu like aches and fevers/chills with > 5 fold increase in LFT's - likely all rifampin related so instructed to stop and has since recovered. · Mycobacterium avium complex-on most recent cultures-pansensitive  · Lower respiratory tract infection:Predominant location in RML with some RUL distribution and biapical pleuroparenchymal infiltrates/fibrosis suggests a sequelae of remote inflammatory/granulomatous process. · CT scan (2016) with some scattered areas of infiltrates-Secondary to above  · Hemoptysis secondary to above-recurring but self-contained and not massive  · Reactive airways disease- allergic vs secondary to above. 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 Repeat 10/2017- normal range. RECOMMENDATIONS:     · Continue Azithromycin-Will de-escalate to 3 times a week for chronic suppressive treatment.     · Can consider additional treatment options if continues to remain with symptoms and/or hemoptysis recurs-discussed bronchial artery embolization as option  · Will check HRCT and sputum for cultures and AFB periodically as indicted  · Continue  Breo-ellipta , prn albuterol  · Instructed to humidify ambient atmosphere- cool mist humidifier  · Discussed airway clearance measures with patient  · Continue with Mucinex in daytime and Delsym at night  · Will consider adding hypertonic saline if mucus clearance remains an issue  · Discussed strict allergen avoidance- grasses, trees. · Strict GERD precautions and add [id number] with GI  · Discussed at length diagnosis and treatment plan- written instructions given. · Discussed indications for bronchoscopy-if has hemoptysis, difficulty with mucus clearance or if further cultures needed  · Preventive vaccinations- Pneumovax PPSV23 , COVID-19 vaccine up to date  · Will follow up 3 months       Health maintenance screens deferred to Primary care provider.      Bi Pastor MD

## 2021-08-05 NOTE — LETTER
8/5/2021    Patient: Taylor Degroot   YOB: 1965   Date of Visit: 8/5/2021     Rodolfo De Oliveira MD  [facility] [id number]  Via Fax: [phone]    Dear Rodolfo De Oliveira MD,      Thank you for referring Ms. Taylor Degroot to [address] [place] [address] for evaluation. My notes for this consultation are attached. If you have questions, please do not hesitate to call me. I look forward to following your patient along with you.       Sincerely,    Jonathon Adame MD

## 2021-08-05 NOTE — PROGRESS NOTES
Mert Lainez presents today for   Chief Complaint   Patient presents with    Hemoptysis    Cough with sputum       Is someone accompanying this pt? No    Is the patient using any DME equipment during OV? No    -DME Company NA    Depression Screening:  3 most recent PHQ Screens 8/21/2020   Little interest or pleasure in doing things Not at all   Feeling down, depressed, irritable, or hopeless Not at all   Total Score PHQ 2 0       Learning Assessment:  Learning Assessment 7/19/2017   PRIMARY LEARNER Patient   HIGHEST LEVEL OF EDUCATION - PRIMARY LEARNER  SOME COLLEGE   PRIMARY LANGUAGE ENGLISH   LEARNER PREFERENCE PRIMARY READING     DEMONSTRATION     LISTENING   ANSWERED BY patient     bsps   RELATIONSHIP SELF       Abuse Screening:  No flowsheet data found. Fall Risk  No flowsheet data found. Coordination of Care:  1. Have you been to the ER, urgent care clinic since your last visit? Hospitalized since your last visit? No    2. Have you seen or consulted any other health care providers outside of the [address] [place] [address] since your last visit? Include any pap smears or colon screening.  No

## 2021-09-02 RX ORDER — FLUTICASONE FUROATE AND VILANTEROL TRIFENATATE 100; 25 UG/1; UG/1
POWDER RESPIRATORY (INHALATION)
Qty: 1 EACH | Refills: 3 | Status: SHIPPED | OUTPATIENT
Start: 2021-09-02 | End: 2021-12-02 | Stop reason: SDUPTHER

## 2021-09-02 NOTE — TELEPHONE ENCOUNTER
Patient requesting refill of Murrel Prude on 9/2/2021. Last office visit: 8/05/2021  Follow up Visit: Due November 2021    Provider is aware of last office visit and follow up. No further action requested from provider.

## 2021-09-02 NOTE — TELEPHONE ENCOUNTER
Pt needs refill of breo eliptta. She asked for a 30 day supply to be sent to [facility].  Pt asked that a nurse give her a call today [phone]

## 2021-11-08 DIAGNOSIS — A31.0 MYCOBACTERIUM KANSASII INFECTION (HCC): ICD-10-CM

## 2021-11-08 RX ORDER — AZITHROMYCIN 250 MG/1
TABLET, FILM COATED ORAL
Qty: 90 TABLET | Refills: 3 | Status: SHIPPED | OUTPATIENT
Start: 2021-11-08

## 2021-11-29 NOTE — TELEPHONE ENCOUNTER
Patient recently got . She has not gotten her  ID to get meds filled at a [address] [place] [address]. but does want that to be where she fills. [facility] too expensive. She has appt with  doctor on Thursday and will get ID then.    She states she will call back once she knows where she wants the Breo to go too

## 2021-11-29 NOTE — TELEPHONE ENCOUNTER
Pt needs written rx for Breo. Please call once it is ready to be picked up. She would like to pick it up tomorrow 11/30/2021.  Please call [phone]

## 2021-12-02 RX ORDER — ALBUTEROL SULFATE 90 UG/1
AEROSOL, METERED RESPIRATORY (INHALATION)
Qty: 1 EACH | Refills: 3 | Status: SHIPPED | OUTPATIENT
Start: 2021-12-02 | End: 2021-12-03 | Stop reason: SDUPTHER

## 2021-12-02 RX ORDER — FLUTICASONE FUROATE AND VILANTEROL TRIFENATATE 100; 25 UG/1; UG/1
POWDER RESPIRATORY (INHALATION)
Qty: 1 EACH | Refills: 3 | Status: SHIPPED | OUTPATIENT
Start: 2021-12-02 | End: 2021-12-03 | Stop reason: SDUPTHER

## 2021-12-02 NOTE — TELEPHONE ENCOUNTER
Pt called requesting refill of breo ellipta and ventolin to be sent to the [facility] pharmacy in [place] [phone] is the phone number.

## 2021-12-03 RX ORDER — ALBUTEROL SULFATE 90 UG/1
AEROSOL, METERED RESPIRATORY (INHALATION)
Qty: 1 EACH | Refills: 5 | Status: SHIPPED
Start: 2021-12-03 | End: 2021-12-07 | Stop reason: SDUPTHER

## 2021-12-03 RX ORDER — FLUTICASONE FUROATE AND VILANTEROL TRIFENATATE 100; 25 UG/1; UG/1
POWDER RESPIRATORY (INHALATION)
Qty: 1 EACH | Refills: 5 | Status: SHIPPED
Start: 2021-12-03 | End: 2021-12-08 | Stop reason: CLARIF

## 2021-12-03 NOTE — TELEPHONE ENCOUNTER
Patient requesting rx's to be sent to [facility] at [address].  The pharmacy is not set up for escript so they printed

## 2021-12-07 RX ORDER — ALBUTEROL SULFATE 90 UG/1
2 AEROSOL, METERED RESPIRATORY (INHALATION)
Qty: 1 EACH | Refills: 3 | Status: SHIPPED | OUTPATIENT
Start: 2021-12-07

## 2021-12-07 NOTE — TELEPHONE ENCOUNTER
Pt calling back re medication. She stated that breo is not carried at the [facility] pharmacy. She is now asking for a sample of breo and written order. She also stated that her insurance will only cover for the generic of ventolin. She wanted both of these written so that she may take them to [facility].  Please advise [phone]

## 2021-12-08 ENCOUNTER — TELEPHONE (OUTPATIENT)
Dept: PULMONOLOGY | Age: 56
End: 2021-12-08

## 2021-12-08 RX ORDER — FLUTICASONE PROPIONATE AND SALMETEROL 250; 50 UG/1; UG/1
1 POWDER RESPIRATORY (INHALATION) 2 TIMES DAILY
Qty: 60 EACH | Refills: 4 | Status: SHIPPED | OUTPATIENT
Start: 2021-12-08 | End: 2022-02-24 | Stop reason: SDUPTHER

## 2021-12-08 NOTE — TELEPHONE ENCOUNTER
Mike Johns is not covered by   Advair HFA and Advair Diskus are covered.    She would like switched to covered med to [facility]

## 2022-01-05 ENCOUNTER — TELEPHONE (OUTPATIENT)
Dept: PULMONOLOGY | Age: 57
End: 2022-01-05

## 2022-01-05 NOTE — TELEPHONE ENCOUNTER
Pt calling our office today requesting an antibiotic. She saw her pcp yesterday and they offered to write her azithromycin but she stated that she already takes that daily and that she would rather have her pulm doctor prescribe it. She said that she did a rapid test for covid at their office yesterday and that it was negative. She is complaining of cold symptoms, productive cough with no color, congestion and wheezing. No fever, no sore throat, no body aches. Pt uses [facility] on [address].  Please advise [phone]

## 2022-01-05 NOTE — TELEPHONE ENCOUNTER
Patient states she only called our office to let us know she is having the cold, cough sxs. Her PCP is treating. They did the Covid rapid test that was negative but the also sent out the PCR test. If negative the PCP is going to treat with an antibiotic per patient. She is using Dayquil and Nyquil for her sxs.  Also Albuterol INH for wheezing that started last pm.   Advised if sxs worsen and needs us to call us back

## 2022-02-24 ENCOUNTER — TELEPHONE (OUTPATIENT)
Dept: PULMONOLOGY | Age: 57
End: 2022-02-24

## 2022-02-24 RX ORDER — FLUTICASONE PROPIONATE AND SALMETEROL 250; 50 UG/1; UG/1
1 POWDER RESPIRATORY (INHALATION) 2 TIMES DAILY
Qty: 3 EACH | Refills: 3 | Status: SHIPPED | COMMUNITY
Start: 2022-02-24

## 2022-03-09 ENCOUNTER — DOCUMENTATION ONLY (OUTPATIENT)
Dept: PULMONOLOGY | Age: 57
End: 2022-03-09

## 2022-04-04 ENCOUNTER — TELEPHONE (OUTPATIENT)
Dept: PULMONOLOGY | Age: 57
End: 2022-04-04

## 2022-04-04 NOTE — TELEPHONE ENCOUNTER
Pt LAUREN([phone]). She tested positive for covid yesterday and wanted to know if there is any specific protocol she should follow due to her respiratory condition.

## 2022-04-04 NOTE — TELEPHONE ENCOUNTER
Patient just returned from vacation to [facility].   She tested positive for Covid today. She has slight cold sxs only. Increasing fluid and rest. quarantining for 5 days per guidelines  Patient to call back if sxs worsen.

## 2022-04-20 DIAGNOSIS — A31.0 MYCOBACTERIUM KANSASII INFECTION (HCC): ICD-10-CM

## 2022-06-28 RX ORDER — AZITHROMYCIN 250 MG/1
TABLET, FILM COATED ORAL
Qty: 90 TABLET | Refills: 3 | OUTPATIENT
Start: 2022-06-28